# Patient Record
Sex: MALE | Race: WHITE | NOT HISPANIC OR LATINO | Employment: FULL TIME | URBAN - NONMETROPOLITAN AREA
[De-identification: names, ages, dates, MRNs, and addresses within clinical notes are randomized per-mention and may not be internally consistent; named-entity substitution may affect disease eponyms.]

---

## 2017-02-03 ENCOUNTER — COMMUNICATION - GICH (OUTPATIENT)
Dept: FAMILY MEDICINE | Facility: OTHER | Age: 47
End: 2017-02-03

## 2017-02-03 DIAGNOSIS — F33.1 MAJOR DEPRESSIVE DISORDER, RECURRENT, MODERATE (H): ICD-10-CM

## 2017-02-09 ENCOUNTER — COMMUNICATION - GICH (OUTPATIENT)
Dept: FAMILY MEDICINE | Facility: OTHER | Age: 47
End: 2017-02-09

## 2017-02-09 DIAGNOSIS — K43.6 OTHER AND UNSPECIFIED VENTRAL HERNIA WITH OBSTRUCTION, WITHOUT GANGRENE: ICD-10-CM

## 2017-02-13 ENCOUNTER — AMBULATORY - GICH (OUTPATIENT)
Dept: FAMILY MEDICINE | Facility: OTHER | Age: 47
End: 2017-02-13

## 2017-02-14 ENCOUNTER — OFFICE VISIT - GICH (OUTPATIENT)
Dept: FAMILY MEDICINE | Facility: OTHER | Age: 47
End: 2017-02-14

## 2017-02-14 ENCOUNTER — HISTORY (OUTPATIENT)
Dept: FAMILY MEDICINE | Facility: OTHER | Age: 47
End: 2017-02-14

## 2017-02-14 ENCOUNTER — COMMUNICATION - GICH (OUTPATIENT)
Dept: FAMILY MEDICINE | Facility: OTHER | Age: 47
End: 2017-02-14

## 2017-02-14 DIAGNOSIS — F33.1 MAJOR DEPRESSIVE DISORDER, RECURRENT, MODERATE (H): ICD-10-CM

## 2017-02-14 DIAGNOSIS — K43.6 OTHER AND UNSPECIFIED VENTRAL HERNIA WITH OBSTRUCTION, WITHOUT GANGRENE: ICD-10-CM

## 2017-02-14 DIAGNOSIS — I10 ESSENTIAL (PRIMARY) HYPERTENSION: ICD-10-CM

## 2017-02-14 DIAGNOSIS — E11.9 TYPE 2 DIABETES MELLITUS WITHOUT COMPLICATIONS (H): ICD-10-CM

## 2017-02-14 DIAGNOSIS — E11.65 TYPE 2 DIABETES MELLITUS WITH HYPERGLYCEMIA (H): ICD-10-CM

## 2017-02-14 DIAGNOSIS — Z71.89 OTHER SPECIFIED COUNSELING: Chronic | ICD-10-CM

## 2017-02-14 DIAGNOSIS — Z79.899 OTHER LONG TERM (CURRENT) DRUG THERAPY: ICD-10-CM

## 2017-05-01 ENCOUNTER — COMMUNICATION - GICH (OUTPATIENT)
Dept: FAMILY MEDICINE | Facility: OTHER | Age: 47
End: 2017-05-01

## 2017-05-01 DIAGNOSIS — K43.6 OTHER AND UNSPECIFIED VENTRAL HERNIA WITH OBSTRUCTION, WITHOUT GANGRENE: ICD-10-CM

## 2017-05-01 DIAGNOSIS — I10 ESSENTIAL (PRIMARY) HYPERTENSION: ICD-10-CM

## 2017-05-01 DIAGNOSIS — R80.9 PROTEINURIA: ICD-10-CM

## 2017-05-01 DIAGNOSIS — F33.1 MAJOR DEPRESSIVE DISORDER, RECURRENT, MODERATE (H): ICD-10-CM

## 2017-05-01 DIAGNOSIS — E11.65 TYPE 2 DIABETES MELLITUS WITH HYPERGLYCEMIA (H): ICD-10-CM

## 2017-05-01 DIAGNOSIS — E11.9 TYPE 2 DIABETES MELLITUS WITHOUT COMPLICATIONS (H): ICD-10-CM

## 2017-05-01 DIAGNOSIS — F41.1 GENERALIZED ANXIETY DISORDER: ICD-10-CM

## 2017-05-01 DIAGNOSIS — R07.1 CHEST PAIN ON BREATHING: ICD-10-CM

## 2017-05-03 ENCOUNTER — COMMUNICATION - GICH (OUTPATIENT)
Dept: FAMILY MEDICINE | Facility: OTHER | Age: 47
End: 2017-05-03

## 2017-05-03 DIAGNOSIS — Z79.4 LONG TERM CURRENT USE OF INSULIN (H): ICD-10-CM

## 2017-05-03 DIAGNOSIS — E11.65 TYPE 2 DIABETES MELLITUS WITH HYPERGLYCEMIA (H): ICD-10-CM

## 2017-05-04 ENCOUNTER — COMMUNICATION - GICH (OUTPATIENT)
Dept: FAMILY MEDICINE | Facility: OTHER | Age: 47
End: 2017-05-04
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
## 2017-05-05 ENCOUNTER — HISTORY (OUTPATIENT)
Dept: FAMILY MEDICINE | Facility: OTHER | Age: 47
End: 2017-05-05

## 2017-05-05 ENCOUNTER — OFFICE VISIT - GICH (OUTPATIENT)
Dept: FAMILY MEDICINE | Facility: OTHER | Age: 47
End: 2017-05-05

## 2017-05-05 ENCOUNTER — HOSPITAL ENCOUNTER (OUTPATIENT)
Dept: RADIOLOGY | Facility: OTHER | Age: 47
End: 2017-05-05
Attending: FAMILY MEDICINE

## 2017-05-05 DIAGNOSIS — Z79.4 LONG TERM CURRENT USE OF INSULIN (H): ICD-10-CM

## 2017-05-05 DIAGNOSIS — E11.65 TYPE 2 DIABETES MELLITUS WITH HYPERGLYCEMIA (H): ICD-10-CM

## 2017-05-05 DIAGNOSIS — K43.6 OTHER AND UNSPECIFIED VENTRAL HERNIA WITH OBSTRUCTION, WITHOUT GANGRENE: ICD-10-CM

## 2017-05-05 DIAGNOSIS — M17.31 POST-TRAUMATIC OSTEOARTHRITIS OF RIGHT KNEE: ICD-10-CM

## 2017-05-05 DIAGNOSIS — I10 ESSENTIAL (PRIMARY) HYPERTENSION: ICD-10-CM

## 2017-05-05 LAB
ALB RAND URINE - HISTORICAL: 490.1 MG/L
ANION GAP - HISTORICAL: 9 (ref 5–18)
BUN SERPL-MCNC: 19 MG/DL (ref 7–25)
BUN/CREAT RATIO - HISTORICAL: 21
CALCIUM SERPL-MCNC: 9.6 MG/DL (ref 8.6–10.3)
CHLORIDE SERPLBLD-SCNC: 99 MMOL/L (ref 98–107)
CO2 SERPL-SCNC: 26 MMOL/L (ref 21–31)
CREAT SERPL-MCNC: 0.9 MG/DL (ref 0.7–1.3)
CREATININE, URINE - HISTORICAL: 2.05 G/L
ESTIMATED AVERAGE GLUCOSE: 223 MG/DL
GFR IF NOT AFRICAN AMERICAN - HISTORICAL: >60 ML/MIN/1.73M2
GLUCOSE SERPL-MCNC: 186 MG/DL (ref 70–105)
HEMOGLOBIN A1C MONITORING (POCT) - HISTORICAL: 9.4 % (ref 4–6.2)
MICROALBUMIN, RAND UR - HISTORICAL: 239.1 MG/G CREAT
POTASSIUM SERPL-SCNC: 4.2 MMOL/L (ref 3.5–5.1)
SODIUM SERPL-SCNC: 134 MMOL/L (ref 133–143)

## 2017-06-09 ENCOUNTER — COMMUNICATION - GICH (OUTPATIENT)
Dept: FAMILY MEDICINE | Facility: OTHER | Age: 47
End: 2017-06-09

## 2017-06-09 DIAGNOSIS — E11.65 TYPE 2 DIABETES MELLITUS WITH HYPERGLYCEMIA (H): ICD-10-CM

## 2017-06-12 ENCOUNTER — AMBULATORY - GICH (OUTPATIENT)
Dept: FAMILY MEDICINE | Facility: OTHER | Age: 47
End: 2017-06-12

## 2017-09-11 ENCOUNTER — AMBULATORY - GICH (OUTPATIENT)
Dept: LAB | Facility: OTHER | Age: 47
End: 2017-09-11

## 2017-09-11 ENCOUNTER — HOSPITAL ENCOUNTER (OUTPATIENT)
Dept: RADIOLOGY | Facility: OTHER | Age: 47
End: 2017-09-11
Attending: FAMILY MEDICINE

## 2017-09-11 ENCOUNTER — OFFICE VISIT - GICH (OUTPATIENT)
Dept: FAMILY MEDICINE | Facility: OTHER | Age: 47
End: 2017-09-11

## 2017-09-11 ENCOUNTER — HISTORY (OUTPATIENT)
Dept: FAMILY MEDICINE | Facility: OTHER | Age: 47
End: 2017-09-11

## 2017-09-11 DIAGNOSIS — K43.6 OTHER AND UNSPECIFIED VENTRAL HERNIA WITH OBSTRUCTION, WITHOUT GANGRENE: ICD-10-CM

## 2017-09-11 DIAGNOSIS — F33.1 MAJOR DEPRESSIVE DISORDER, RECURRENT, MODERATE (H): ICD-10-CM

## 2017-09-11 DIAGNOSIS — R07.1 CHEST PAIN ON BREATHING: ICD-10-CM

## 2017-09-11 DIAGNOSIS — Z79.4 LONG TERM CURRENT USE OF INSULIN (H): ICD-10-CM

## 2017-09-11 DIAGNOSIS — M17.31 POST-TRAUMATIC OSTEOARTHRITIS OF RIGHT KNEE: ICD-10-CM

## 2017-09-11 DIAGNOSIS — C18.1 MALIGNANT NEOPLASM OF APPENDIX (H): ICD-10-CM

## 2017-09-11 DIAGNOSIS — E11.65 TYPE 2 DIABETES MELLITUS WITH HYPERGLYCEMIA (H): ICD-10-CM

## 2017-09-11 DIAGNOSIS — E11.9 TYPE 2 DIABETES MELLITUS WITHOUT COMPLICATIONS (H): ICD-10-CM

## 2017-09-11 DIAGNOSIS — M16.12 PRIMARY OSTEOARTHRITIS OF LEFT HIP: ICD-10-CM

## 2017-09-11 DIAGNOSIS — R80.9 PROTEINURIA: ICD-10-CM

## 2017-09-11 DIAGNOSIS — I10 ESSENTIAL (PRIMARY) HYPERTENSION: ICD-10-CM

## 2017-09-11 LAB
A/G RATIO - HISTORICAL: 1.2 (ref 1–2)
ABSOLUTE BASOPHILS - HISTORICAL: 0.1 THOU/CU MM
ABSOLUTE EOSINOPHILS - HISTORICAL: 0.1 THOU/CU MM
ABSOLUTE IMMATURE GRANULOCYTES(METAS,MYELOS,PROS) - HISTORICAL: 0 THOU/CU MM
ABSOLUTE LYMPHOCYTES - HISTORICAL: 3.3 THOU/CU MM (ref 0.9–2.9)
ABSOLUTE MONOCYTES - HISTORICAL: 0.9 THOU/CU MM
ABSOLUTE NEUTROPHILS - HISTORICAL: 5.4 THOU/CU MM (ref 1.7–7)
ALBUMIN SERPL-MCNC: 3.8 G/DL (ref 3.5–5.7)
ALP SERPL-CCNC: 71 IU/L (ref 34–104)
ALT (SGPT) - HISTORICAL: 60 IU/L (ref 7–52)
ANION GAP - HISTORICAL: 14 (ref 5–18)
AST SERPL-CCNC: 51 IU/L (ref 13–39)
BASOPHILS # BLD AUTO: 0.6 %
BILIRUB SERPL-MCNC: 0.6 MG/DL (ref 0.3–1)
BUN SERPL-MCNC: 11 MG/DL (ref 7–25)
BUN/CREAT RATIO - HISTORICAL: 14
CALCIUM SERPL-MCNC: 9.3 MG/DL (ref 8.6–10.3)
CHLORIDE SERPLBLD-SCNC: 100 MMOL/L (ref 98–107)
CO2 SERPL-SCNC: 20 MMOL/L (ref 21–31)
CREAT SERPL-MCNC: 0.77 MG/DL (ref 0.7–1.3)
EOSINOPHIL NFR BLD AUTO: 1 %
ERYTHROCYTE [DISTWIDTH] IN BLOOD BY AUTOMATED COUNT: 13.8 % (ref 11.5–15.5)
ESTIMATED AVERAGE GLUCOSE: 214 MG/DL
GFR IF NOT AFRICAN AMERICAN - HISTORICAL: >60 ML/MIN/1.73M2
GLOBULIN - HISTORICAL: 3.3 G/DL (ref 2–3.7)
GLUCOSE SERPL-MCNC: 142 MG/DL (ref 70–105)
HCT VFR BLD AUTO: 43 % (ref 37–53)
HEMOGLOBIN A1C MONITORING (POCT) - HISTORICAL: 9.1 % (ref 4–6.2)
HEMOGLOBIN: 14.7 G/DL (ref 13.5–17.5)
IMMATURE GRANULOCYTES(METAS,MYELOS,PROS) - HISTORICAL: 0.3 %
LYMPHOCYTES NFR BLD AUTO: 34.1 % (ref 20–44)
MCH RBC QN AUTO: 32.2 PG (ref 26–34)
MCHC RBC AUTO-ENTMCNC: 34.2 G/DL (ref 32–36)
MCV RBC AUTO: 94 FL (ref 80–100)
MONOCYTES NFR BLD AUTO: 8.9 %
NEUTROPHILS NFR BLD AUTO: 55.1 % (ref 42–72)
PLATELET # BLD AUTO: 331 THOU/CU MM (ref 140–440)
PMV BLD: 10.4 FL (ref 6.5–11)
POTASSIUM SERPL-SCNC: 4.3 MMOL/L (ref 3.5–5.1)
PROT SERPL-MCNC: 7.1 G/DL (ref 6.4–8.9)
RED BLOOD COUNT - HISTORICAL: 4.56 MIL/CU MM (ref 4.3–5.9)
SODIUM SERPL-SCNC: 134 MMOL/L (ref 133–143)
WHITE BLOOD COUNT - HISTORICAL: 9.8 THOU/CU MM (ref 4.5–11)

## 2017-09-12 LAB
ALB RAND URINE - HISTORICAL: 1191.5 MG/L
CREATININE, URINE - HISTORICAL: 3.25 G/L
MICROALBUMIN, RAND UR - HISTORICAL: 366.6 MG/G CREAT

## 2017-09-13 ENCOUNTER — AMBULATORY - GICH (OUTPATIENT)
Dept: FAMILY MEDICINE | Facility: OTHER | Age: 47
End: 2017-09-13

## 2017-09-13 DIAGNOSIS — F33.1 MAJOR DEPRESSIVE DISORDER, RECURRENT, MODERATE (H): ICD-10-CM

## 2017-09-15 ENCOUNTER — AMBULATORY - GICH (OUTPATIENT)
Dept: SCHEDULING | Facility: OTHER | Age: 47
End: 2017-09-15

## 2017-11-06 ENCOUNTER — HOSPITAL ENCOUNTER (OUTPATIENT)
Dept: RADIOLOGY | Facility: OTHER | Age: 47
End: 2017-11-06
Attending: FAMILY MEDICINE

## 2017-11-06 ENCOUNTER — HISTORY (OUTPATIENT)
Dept: FAMILY MEDICINE | Facility: OTHER | Age: 47
End: 2017-11-06

## 2017-11-06 ENCOUNTER — OFFICE VISIT - GICH (OUTPATIENT)
Dept: FAMILY MEDICINE | Facility: OTHER | Age: 47
End: 2017-11-06

## 2017-11-06 DIAGNOSIS — M54.10 RADICULOPATHY: ICD-10-CM

## 2017-11-06 DIAGNOSIS — M70.62 TROCHANTERIC BURSITIS OF LEFT HIP: ICD-10-CM

## 2017-11-06 DIAGNOSIS — F41.1 GENERALIZED ANXIETY DISORDER: ICD-10-CM

## 2017-11-11 ENCOUNTER — COMMUNICATION - GICH (OUTPATIENT)
Dept: FAMILY MEDICINE | Facility: OTHER | Age: 47
End: 2017-11-11

## 2017-11-11 DIAGNOSIS — K43.6 OTHER AND UNSPECIFIED VENTRAL HERNIA WITH OBSTRUCTION, WITHOUT GANGRENE: ICD-10-CM

## 2017-11-11 DIAGNOSIS — R07.1 CHEST PAIN ON BREATHING: ICD-10-CM

## 2017-11-16 ENCOUNTER — COMMUNICATION - GICH (OUTPATIENT)
Dept: FAMILY MEDICINE | Facility: OTHER | Age: 47
End: 2017-11-16

## 2017-11-16 ENCOUNTER — OFFICE VISIT - GICH (OUTPATIENT)
Dept: FAMILY MEDICINE | Facility: OTHER | Age: 47
End: 2017-11-16

## 2017-11-16 ENCOUNTER — HISTORY (OUTPATIENT)
Dept: FAMILY MEDICINE | Facility: OTHER | Age: 47
End: 2017-11-16

## 2017-11-16 DIAGNOSIS — M70.62 TROCHANTERIC BURSITIS OF LEFT HIP: ICD-10-CM

## 2017-11-16 DIAGNOSIS — M17.31 POST-TRAUMATIC OSTEOARTHRITIS OF RIGHT KNEE: ICD-10-CM

## 2017-11-16 DIAGNOSIS — K43.6 OTHER AND UNSPECIFIED VENTRAL HERNIA WITH OBSTRUCTION, WITHOUT GANGRENE: ICD-10-CM

## 2017-11-21 ENCOUNTER — AMBULATORY - GICH (OUTPATIENT)
Dept: FAMILY MEDICINE | Facility: OTHER | Age: 47
End: 2017-11-21

## 2017-11-21 ENCOUNTER — HOSPITAL ENCOUNTER (OUTPATIENT)
Dept: RADIOLOGY | Facility: OTHER | Age: 47
End: 2017-11-21
Attending: FAMILY MEDICINE

## 2017-11-21 DIAGNOSIS — M54.10 RADICULOPATHY: ICD-10-CM

## 2017-11-22 ENCOUNTER — AMBULATORY - GICH (OUTPATIENT)
Dept: FAMILY MEDICINE | Facility: OTHER | Age: 47
End: 2017-11-22

## 2017-11-22 DIAGNOSIS — M25.552 PAIN IN LEFT HIP: ICD-10-CM

## 2017-11-27 ENCOUNTER — HOSPITAL ENCOUNTER (OUTPATIENT)
Dept: RADIOLOGY | Facility: OTHER | Age: 47
End: 2017-11-27
Attending: FAMILY MEDICINE

## 2017-11-27 DIAGNOSIS — M25.552 PAIN IN LEFT HIP: ICD-10-CM

## 2017-11-28 ENCOUNTER — AMBULATORY - GICH (OUTPATIENT)
Dept: FAMILY MEDICINE | Facility: OTHER | Age: 47
End: 2017-11-28

## 2017-11-28 DIAGNOSIS — M16.12 PRIMARY OSTEOARTHRITIS OF LEFT HIP: ICD-10-CM

## 2017-12-04 ENCOUNTER — HOSPITAL ENCOUNTER (OUTPATIENT)
Dept: RADIOLOGY | Facility: OTHER | Age: 47
End: 2017-12-04
Attending: FAMILY MEDICINE

## 2017-12-04 DIAGNOSIS — M16.12 PRIMARY OSTEOARTHRITIS OF LEFT HIP: ICD-10-CM

## 2017-12-05 ENCOUNTER — AMBULATORY - GICH (OUTPATIENT)
Dept: FAMILY MEDICINE | Facility: OTHER | Age: 47
End: 2017-12-05

## 2017-12-27 NOTE — PROGRESS NOTES
Patient Information     Patient Name MRN Sex Jaswant Quevedo [id number] Male 1970      Progress Notes by Ashia Sam at 2017  2:11 PM     Author:  Ashia Sam Service:  (none) Author Type:  Other Clinical Staff     Filed:  2017  2:12 PM Date of Service:  2017  2:11 PM Status:  Signed     :  Ashia Sam (Other Clinical Staff)            1.  Has the patient had a previous reaction to IV contrast? No    2.  Does the patient have kidney disease? No    3.  Is the patient on dialysis? No    If YES to any of these questions, exam will be reviewed with a Radiologist before administering contrast.

## 2017-12-27 NOTE — PROGRESS NOTES
"Patient Information     Patient Name MRN Sex Jaswant Quevedo [id number] Male 1970      Progress Notes by Mik Kumari MD at 2017 11:45 AM     Author:  Mik Kumari MD  Service:  (none) Author Type:  Physician     Filed:  2017  7:39 AM  Encounter Date:  2017 Status:  Addendum     :  Mik Kumari MD (Physician)        Related Notes: Original Note by Mik Kumari MD (Physician) filed at 2017  6:52 AM            SUBJECTIVE:    Jaswant Chong is a 47 y.o. male who presents for left hip pains and foot weakness    HPI    sig pain for about 10 days in left hip area.  Feels like a pinched nerve.  Tried wearing his support hose, no help.  No known injuries.  Has some left leg weakness, and stumbled with walking.  No lumbar pain.    No Known Allergies,   Current Outpatient Prescriptions on File Prior to Visit       Medication  Sig Dispense Refill     amLODIPine (NORVASC) 10 mg tablet Take 1 tablet by mouth once daily. 90 tablet 3     blood sugar diagnostic (BLOOD GLUCOSE TEST) strip Dispense item covered by pt ins. E11.9 NIDDM type II - Test 3 times/day, Reason: Unstable diabetes 300 Each 4     blood-glucose meter Dispense meter covered by pt ins. E11.9 IDDM type II - Test 3 times/day. 1 Device 0     DME Blood pressure machine 1 Each 0     doxycycline (VIBRAMYCIN) 100 mg capsule 2 caps once 2 capsule 6     indomethacin (INDOCIN) 50 mg capsule Take 1 capsule by mouth 3 times daily with meals. 42 capsule 2     insulin aspart (NOVOLOG FLEXPEN) 100 unit/mL solution for injection Inject 5 Units subcutaneous 3 times daily before meals. 5 pen 3     insulin glargine (LANTUS SOLOSTAR PEN; BASAGLAR KWIKPEN) 100 unit/mL (3 mL) pen Inject 15 Units subcutaneous before bedtime. 1 box 12     Insulin Needles, Disposable, (VELIA PEN NEEDLE) 32 gauge x 32\" As directed. For administering insulin at home. 1 box 12     lancets Dispense item covered by pt ins. E11.9 NIDDM type II - Test 3 " times/day, Reason: Unstable diabetes 300 Each 4     liraglutide (VICTOZA) 0.6 mg/0.1 mL (18 mg/3 mL) injection Inject 0.6 mg subcutaneous once daily. 9 mL 3     lisinopril (PRINIVIL; ZESTRIL) 40 mg tablet Take 1 tablet by mouth once daily. 90 tablet 1     LORazepam (ATIVAN) 1 mg tablet Take 1 tablet by mouth every 6 hours if needed for Anxiety. 30 tablet 2     metFORMIN (GLUCOPHAGE) 1,000 mg tablet Take 1 tablet by mouth 2 times daily with meals. 180 tablet 3     metoprolol succinate SR (TOPROL XL) 200 mg Sustained-Release tablet Take 1 tablet by mouth once daily. 90 tablet 3     ondansetron (ZOFRAN ODT) 4 mg disintegrating tablet Place 1 tablet on the tongue every 8 hours if needed for Nausea/Vomiting. 30 tablet 3     oxyCODONE-acetaminophen, 5-325 mg, (PERCOCET) 5-325 mg per tablet Take 1-2 tablets by mouth every 6 hours if needed  Max acetaminophen dose: 4000mg in 24 hrs. Fill on/ after 11/10/17 120 tablet 0     pravastatin (PRAVACHOL) 20 mg tablet Take 1 tablet by mouth at bedtime. 90 tablet 3     sertraline (ZOLOFT) 100 mg tablet Take 1 tablet by mouth once daily. 90 tablet 3     traMADol (ULTRAM) 50 mg tablet Take 1 tablet by mouth 4 times daily. 120 tablet 5     typhoid vaccin,live,attenuated (VIVOTIF) 2 billion unit capsule 1 capsule ORALLY x 4 doses, on days 1, 3, 5, and 7, to be completed at least one week prior to potential exposure 4 capsule 0     No current facility-administered medications on file prior to visit.    ,   Past Medical History:     Diagnosis  Date     Cancer of appendix (HC)     H/O      Colovesical fistula     history of, secondary to diverticulitis.      Diabetes mellitus (HC)     Type 2      Ventral hernia     and   Past Surgical History:      Procedure  Laterality Date     COLECTOMY  2006     Hemicolectomy with appendiceal cancer noted, mucinous type.       CYST REMOVAL      sebaceous, scalp       excision of finger mass  4/25/13    left thumb, Dr. Donald       HERNIA REPAIR  03/08     Repair of surgical wound hernia, metastatic cancer incidentally noted.       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 Marks       HERNIA REPAIR  11/2001    Reduction of ventral hernia and mesh repair of hernia       HERNIA REPAIR  12/2012    Removal mesh with infection 12/12       HX ABDOMEN PERITONEUM OMENTUM SURGERY  04/08    removal of left hemidiaphragm and omentum [Other] as well as intraperitoneal chemotherapy, 5 FU and Oxaliplatin for metastatic mucinous adenocarcinoma of the appendix.[[[       LYMPH NODE DISSECTION  2007       REVIEW OF SYSTEMS:  Review of Systems   Constitutional: Negative for chills and fever.   Musculoskeletal: Positive for joint pain.   Neurological: Positive for tingling.       OBJECTIVE:  /66  Pulse 66  Resp 16    EXAM:   Physical Exam   Constitutional: He is oriented to person, place, and time and well-developed, well-nourished, and in no distress. No distress.   Musculoskeletal:   No lumbar or sciatic notch pain on palpitation.  Neg straight leg raise.  2/5 weakness with left toe dorsiflexion.  Moderate pain on palpitation over the left greater trochanter.   Neurological: He is alert and oriented to person, place, and time.   Skin: He is not diaphoretic.   Psychiatric: Memory, affect and judgment normal.     X-ray shows moderate ddd, with narrowing of the L5/S1 foramen     ASSESSMENT/PLAN:    ICD-10-CM    1. Radicular syndrome of left leg M54.10 XR SPINE LUMBAR 3 VIEWS      MR SPINE LUMBAR WO   2. Trochanteric bursitis of left hip M70.62 OH DRAIN/INJECT LARGE JOINT/BURSA (hip, knee,shoulder) - single joint  [03259.0]      triamcinolone acetonide 80 mg injection (KENALOG)        Plan:  It seems there are 2 different but likely related issues.  For symptoms relief, injection of the bursa today.  Discussed with him risks and he consented.  Infiltrated  with 3 mL 1 % lidocaine anfd80 mg depotmedrol.    I am a bit worried about a L5 impingement.  Discussed with him an EMG or MRI.  Will next get the MRI.    Mik Kumari MD ....................  11/6/2017   12:18 PM    Patient did indeed receive depotmedrol, not kenalog as stated above.  Mik Kumari MD ....................  11/21/2017   6:52 AM    Upon further discussion with nursing staff, kenalog was actually given.  Not depotmedrol.  Mik Kumari MD ....................  11/28/2017   7:39 AM

## 2017-12-27 NOTE — PROGRESS NOTES
Patient Information     Patient Name MRN Sex Jaswant Quevedo [id number] Male 1970      Progress Notes by Dagmar Wilkinson at 2017  9:15 AM     Author:  Dagmar Wilkinson Service:  (none) Author Type:  Other Clinical Staff     Filed:  2017  9:16 AM Date of Service:  2017  9:15 AM Status:  Signed     :  Dagmar Wilkinson (Other Clinical Staff)            Falls Risk Criteria:    Age 65 and older or under age 4        Sensory deficits    Poor vision    Use of ambulatory aides    Impaired judgment    Unable to walk independently    Meets High Risk criteria for falls:  no

## 2017-12-27 NOTE — PROGRESS NOTES
"Patient Information     Patient Name MRN Sex Jaswant Quevedo [id number] Male 1970      Progress Notes by Mik Kumari MD at 2017 11:15 AM     Author:  Mik Kumari MD Service:  (none) Author Type:  Physician     Filed:  2017  7:33 AM Encounter Date:  2017 Status:  Signed     :  Mik Kumari MD (Physician)            SUBJECTIVE:    Jaswant Chong is a 47 y.o. male who presents for pain med refill    HPI    Having lots of pain in the hip.  Had a steroid injection 2 weeks ago, helped for just a few days.  MRI is next week, canceled it until his wife leaves for her [place] vacation.  Leg still feels weak.  Has a hard time getting out of a car.    Also due for percocet refills.  Uses 3-4 ultram tabs daily and percocet is 0-2 daily.  Tylenol, alleve and advil not helpful at all.    No Known Allergies,   Current Outpatient Prescriptions on File Prior to Visit       Medication  Sig Dispense Refill     amLODIPine (NORVASC) 10 mg tablet Take 1 tablet by mouth once daily. 90 tablet 3     blood sugar diagnostic (BLOOD GLUCOSE TEST) strip Dispense item covered by pt ins. E11.9 NIDDM type II - Test 3 times/day, Reason: Unstable diabetes 300 Each 4     blood-glucose meter Dispense meter covered by pt ins. E11.9 IDDM type II - Test 3 times/day. 1 Device 0     DME Blood pressure machine 1 Each 0     doxycycline (VIBRAMYCIN) 100 mg capsule 2 caps once 2 capsule 6     indomethacin (INDOCIN) 50 mg capsule TAKE 1 CAPSULE BY MOUTH THREE TIMES DAILY WITH MEALS 42 capsule 0     insulin aspart (NOVOLOG FLEXPEN) 100 unit/mL solution for injection Inject 5 Units subcutaneous 3 times daily before meals. 5 pen 3     insulin glargine (LANTUS SOLOSTAR PEN; BASAGLAR KWIKPEN) 100 unit/mL (3 mL) pen Inject 15 Units subcutaneous before bedtime. 1 box 12     Insulin Needles, Disposable, (VELIA PEN NEEDLE) 32 gauge x 32\" As directed. For administering insulin at home. 1 box 12     lancets Dispense item " covered by pt ins. E11.9 NIDDM type II - Test 3 times/day, Reason: Unstable diabetes 300 Each 4     liraglutide (VICTOZA) 0.6 mg/0.1 mL (18 mg/3 mL) injection Inject 0.6 mg subcutaneous once daily. 9 mL 3     lisinopril (PRINIVIL; ZESTRIL) 40 mg tablet Take 1 tablet by mouth once daily. 90 tablet 1     LORazepam (ATIVAN) 1 mg tablet Take 1 tablet by mouth every 6 hours if needed for Anxiety. 1 tablet 0     metFORMIN (GLUCOPHAGE) 1,000 mg tablet Take 1 tablet by mouth 2 times daily with meals. 180 tablet 3     metoprolol succinate SR (TOPROL XL) 200 mg Sustained-Release tablet Take 1 tablet by mouth once daily. 90 tablet 3     ondansetron (ZOFRAN ODT) 4 mg disintegrating tablet Place 1 tablet on the tongue every 8 hours if needed for Nausea/Vomiting. 30 tablet 3     pravastatin (PRAVACHOL) 20 mg tablet Take 1 tablet by mouth at bedtime. 90 tablet 3     sertraline (ZOLOFT) 100 mg tablet Take 1 tablet by mouth once daily. 90 tablet 3     traMADol (ULTRAM) 50 mg tablet Take 1 tablet by mouth 4 times daily. 120 tablet 5     typhoid vaccin,live,attenuated (VIVOTIF) 2 billion unit capsule 1 capsule ORALLY x 4 doses, on days 1, 3, 5, and 7, to be completed at least one week prior to potential exposure 4 capsule 0     No current facility-administered medications on file prior to visit.    ,   Past Medical History:     Diagnosis  Date     Cancer of appendix (HC)     H/O      Colovesical fistula     history of, secondary to diverticulitis.      Diabetes mellitus (HC)     Type 2      Ventral hernia     and   Past Surgical History:      Procedure  Laterality Date     COLECTOMY  2006     Hemicolectomy with appendiceal cancer noted, mucinous type.       CYST REMOVAL      sebaceous, scalp       excision of finger mass  4/25/13    left thumb, Dr. Donald       HERNIA REPAIR  03/08    Repair of surgical wound hernia, metastatic cancer incidentally noted.       HERNIA REPAIR  04/09     Ventral hernia repair, recurrent appendiceal  "carcinoma, resection of 7 centimeter left upper quadrant tumor with splenectomy, Surgeon, Dr. Marks       HERNIA REPAIR  11/2001    Reduction of ventral hernia and mesh repair of hernia       HERNIA REPAIR  12/2012    Removal mesh with infection 12/12       HX ABDOMEN PERITONEUM OMENTUM SURGERY  04/08    removal of left hemidiaphragm and omentum [Other] as well as intraperitoneal chemotherapy, 5 FU and Oxaliplatin for metastatic mucinous adenocarcinoma of the appendix.[[[       LYMPH NODE DISSECTION  2007       REVIEW OF SYSTEMS:  Review of Systems   Constitutional: Negative for chills and fever.   Gastrointestinal: Positive for abdominal pain.   Musculoskeletal: Positive for joint pain.   Neurological: Positive for tingling and focal weakness.   Psychiatric/Behavioral: Negative for substance abuse.       OBJECTIVE:  /82  Pulse 62  Ht 1.91 m (6' 3.2\")  Wt (!) 145.3 kg (320 lb 4 oz)  BMI 39.82 kg/m2    EXAM:   Physical Exam   Constitutional: He is oriented to person, place, and time and well-developed, well-nourished, and in no distress. No distress.   HENT:   Head: Normocephalic and atraumatic.   Musculoskeletal:   No lumbar pain on palpation.  Slow to stand.  Mild pain over left greater trochanter.  Left foot dorsiflexion is 3-4/5 right is 5/5.   Neurological: He is alert and oriented to person, place, and time.   Skin: He is not diaphoretic.   Psychiatric: Memory, affect and judgment normal.       ASSESSMENT/PLAN:    ICD-10-CM    1. Ventral hernia with obstruction K43.6 oxyCODONE-acetaminophen, 5-325 mg, (PERCOCET) 5-325 mg per tablet      DISCONTINUED: oxyCODONE-acetaminophen, 5-325 mg, (PERCOCET) 5-325 mg per tablet      DISCONTINUED: oxyCODONE-acetaminophen, 5-325 mg, (PERCOCET) 5-325 mg per tablet   2. Post-traumatic osteoarthritis of right knee M17.31 oxyCODONE-acetaminophen, 5-325 mg, (PERCOCET) 5-325 mg per tablet      DISCONTINUED: oxyCODONE-acetaminophen, 5-325 mg, (PERCOCET) 5-325 mg per " tablet      DISCONTINUED: oxyCODONE-acetaminophen, 5-325 mg, (PERCOCET) 5-325 mg per tablet   3. Trochanteric bursitis, left hip M70.62         Plan:  I refilled the percocet, for 3 months total.  I suspect he has a left L5 radiculopathy.  I advise therapy, but he wants to wait for the MRI.  It is too early for a repeat trochanter injection.    Mik Kumari MD ....................  11/16/2017   2:55 PM

## 2017-12-27 NOTE — PROGRESS NOTES
Patient Information     Patient Name MRN Sex Jaswant Quevedo [id number] Male 1970      Progress Notes by Ashia Sam at 2017  2:12 PM     Author:  Ashia Sam Service:  (none) Author Type:  Other Clinical Staff     Filed:  2017  2:12 PM Date of Service:  2017  2:12 PM Status:  Signed     :  Ashia Sam (Other Clinical Staff)            Falls Risk Criteria:    Age 65 and older or under age 4        Sensory deficits    Poor vision    Use of ambulatory aides    Impaired judgment    Unable to walk independently    Meets High Risk criteria for falls:  no

## 2017-12-28 NOTE — TELEPHONE ENCOUNTER
Patient Information     Patient Name MRN Jaswant Dunham [id number] Male 1970      Telephone Encounter by Amy Xiong RN at 2017  9:42 AM     Author:  Amy Xiong RN Service:  (none) Author Type:  NURS- Registered Nurse     Filed:  2017  9:54 AM Encounter Date:  2017 Status:  Signed     :  Amy Xiong RN (NURS- Registered Nurse)            Tramadol filled 2017 #[id number]  Unable to complete prescription refill per RN Medication Refill Policy.................... Amy Xiong RN ....................  2017   9:45 AM      GOUT    Office visit in the past 12 months or per provider note.    Last visit with HAYDE BRYANT was on: 2017 in [facility]  Next visit with HAYDE BRYANT is on: No future appointment listed with this provider  Next visit with Family Practice is on: No future appointment listed in this department    Max refill for 12 months from last office visit or per provider note.    Prescription refilled per RN Medication Refill Policy.................... Amy Xiong RN ....................  2017   9:54 AM

## 2017-12-28 NOTE — TELEPHONE ENCOUNTER
Patient Information     Patient Name MRN Jaswant Dunham [id number] Male 1970      Telephone Encounter by Jojo Jorge RN at 2017  4:07 PM     Author:  Jojo Jorge RN Service:  (none) Author Type:  NURS- Registered Nurse     Filed:  2017  4:15 PM Encounter Date:  2017 Status:  Signed     :  Jojo Jorge RN (NURS- Registered Nurse)            Diabetic Supplies    Office visit in the past 12 months.    Last visit with HAYDE BRYANT was on: 2017 in [facility]  Next visit with HAYDE BRYANT is on: No future appointment listed with this provider  Next visit with Family Practice is on: No future appointment listed in this department    Max refill for 12 months from last office visit.  Always add ICD-9 code.    Needs not be listed on Med List to fill.  Need new RX every 12 months or if exceeds the limitations set by Medicare, then every 6 months.  Also when testing frequency is changed is there a need to obtain a new order.  A refill request does not need to be approved by the ordering physician-a beneficiary or their caregiver may request refills.  Physicians are not required to fill out additional forms such as home testing results for suppliers or provide additional documentation unless the supplier is audited and the  is requesting such documentation.      Refill request completed for patient's strips and lancets. Per the RN refill policy, unable to fill request for meter. To PCP for consideration.    Jojo Jorge RN............. 2017 4:08 PM

## 2017-12-28 NOTE — PROGRESS NOTES
Patient Information     Patient Name MRN Sex Jaswant Quevedo [id number] Male 1970      Progress Notes by Mik Kumari MD at 2017 10:30 AM     Author:  Mik Kumari MD Service:  (none) Author Type:  Physician     Filed:  2017 11:54 AM Encounter Date:  2017 Status:  Signed     :  Mik Kumari MD (Physician)            SUBJECTIVE:    Jaswant Chong is a 47 y.o. male who presents for follow up pain and diabetes mellitus    HPI    Is fasting currently.  Does not check his sugars currently.  Stropped a few weeks ago, but when he was checking it was higher in the AM, then lower through the day.  No lows.    Also, has noted weakness in the left leg for the last 6 weeks or so. Now is having a hard time walking up steps with this, has to do one at a time.  Cannot use the left leg to get up them.  No pain in his back.  He feels this weakness is from a varicose vein.   Uses compression stockings nearly daily.  Has knee pain, degenerative joint disease on last x-ray.    Hernia pain continues.  6/10.  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  Stools remain normal.      No Known Allergies,   Current Outpatient Prescriptions on File Prior to Visit       Medication  Sig Dispense Refill     amLODIPine (NORVASC) 10 mg tablet Take 1 tablet by mouth once daily. 90 tablet 3     blood sugar diagnostic (BLOOD GLUCOSE TEST) strip Dispense item covered by pt ins. E11.9 NIDDM type II - Test 3 times/day, Reason: Unstable diabetes 300 Each 4     blood-glucose meter Dispense meter covered by pt ins. E11.9 IDDM type II - Test 3 times/day. 1 Device 0     DME Blood pressure machine 1 Each 0     doxycycline (VIBRAMYCIN) 100 mg capsule 2 caps once 2 capsule 6     indomethacin (INDOCIN) 50 mg capsule Take 1 capsule by mouth 3 times daily with meals. 42 capsule 2     insulin aspart (NOVOLOG FLEXPEN) 100 unit/mL solution for injection Inject 5 Units subcutaneous 3 times daily  "before meals. 5 pen 3     insulin glargine (LANTUS SOLOSTAR PEN; BASAGLAR KWIKPEN) 100 unit/mL (3 mL) pen Inject 15 Units subcutaneous before bedtime. 1 box 12     Insulin Needles, Disposable, (VELIA PEN NEEDLE) 32 gauge x 5/32\" As directed. For administering insulin at home. 1 box 12     lancets Dispense item covered by pt ins. E11.9 NIDDM type II - Test 3 times/day, Reason: Unstable diabetes 300 Each 4     lisinopril (PRINIVIL; ZESTRIL) 40 mg tablet Take 1 tablet by mouth once daily. 90 tablet 1     LORazepam (ATIVAN) 1 mg tablet Take 1 tablet by mouth every 6 hours if needed for Anxiety. 30 tablet 2     metFORMIN (GLUCOPHAGE) 1,000 mg tablet Take 1 tablet by mouth 2 times daily with meals. 180 tablet 3     metoprolol succinate SR (TOPROL XL) 200 mg Sustained-Release tablet Take 1 tablet by mouth once daily. 90 tablet 3     ondansetron (ZOFRAN ODT) 4 mg disintegrating tablet Place 1 tablet on the tongue every 8 hours if needed for Nausea/Vomiting. 30 tablet 3     pravastatin (PRAVACHOL) 20 mg tablet Take 1 tablet by mouth at bedtime. 90 tablet 3     sertraline (ZOLOFT) 100 mg tablet Take 1 tablet by mouth once daily. 90 tablet 3     sitaGLIPtin (JANUVIA) 100 mg tablet Take 1 tablet by mouth once daily. 90 tablet 3     traMADol (ULTRAM) 50 mg tablet Take 1 tablet by mouth 4 times daily. 120 tablet 5     typhoid vaccin,live,attenuated (VIVOTIF) 2 billion unit capsule 1 capsule ORALLY x 4 doses, on days 1, 3, 5, and 7, to be completed at least one week prior to potential exposure 4 capsule 0     No current facility-administered medications on file prior to visit.    ,   Past Medical History:     Diagnosis  Date     Cancer of appendix (HC)     H/O      Colovesical fistula     history of, secondary to diverticulitis.      Diabetes mellitus (HC)     Type 2      Ventral hernia     and   Past Surgical History:      Procedure  Laterality Date     COLECTOMY  2006     Hemicolectomy with appendiceal cancer noted, mucinous " type.       CYST REMOVAL      sebaceous, scalp       excision of finger mass  4/25/13    left thumb, Dr. Donald       HERNIA REPAIR  03/08    Repair of surgical wound hernia, metastatic cancer incidentally noted.       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 Marks       HERNIA REPAIR  11/2001    Reduction of ventral hernia and mesh repair of hernia       HERNIA REPAIR  12/2012    Removal mesh with infection 12/12       HX ABDOMEN PERITONEUM OMENTUM SURGERY  04/08    removal of left hemidiaphragm and omentum [Other] as well as intraperitoneal chemotherapy, 5 FU and Oxaliplatin for metastatic mucinous adenocarcinoma of the appendix.[[[       LYMPH NODE DISSECTION  2007       REVIEW OF SYSTEMS:  Review of Systems   Constitutional: Negative for chills and fever.   Gastrointestinal: Positive for abdominal pain. Negative for constipation and diarrhea.   Musculoskeletal: Positive for joint pain. Negative for back pain.   Neurological: Negative for headaches.   Psychiatric/Behavioral: Negative for substance abuse.       OBJECTIVE:  /70  Resp 16    EXAM:   Physical Exam   Constitutional: He is oriented to person, place, and time and well-developed, well-nourished, and in no distress. No distress.   HENT:   Head: Normocephalic and atraumatic.   Neck: Neck supple. No thyromegaly present.   Cardiovascular: Normal rate, regular rhythm, normal heart sounds and intact distal pulses.  Exam reveals no gallop and no friction rub.    No murmur heard.  Abdominal: Soft. He exhibits distension. There is no tenderness. There is no rebound.   Very large left anterior ambulate wall hernia, reducible.   Musculoskeletal:   Is unable to stand on the exam table with left leg.  No pain in lumbar spine. Neg straight leg raise bilaterally.  Left knee with full range of motion, no effusion.  Stable on varus and valgus  stressing.   Neurological: He is alert and oriented to person, place, and time.   Skin: He is not diaphoretic.   Psychiatric: Memory, affect and judgment normal.       ASSESSMENT/PLAN:    ICD-10-CM    1. VENTRAL HERNIA WITH INCARCERATION K43.6    2. Primary osteoarthritis of left hip M16.12    3. Uncontrolled type 2 diabetes mellitus without complication, with long-term current use of insulin (HC) E11.65 MICROALBUMIN RANDOM URINE     Z79.4 Hgb A1c      liraglutide (VICTOZA) 0.6 mg/0.1 mL (18 mg/3 mL) injection   4. Ventral hernia with obstruction K43.6 oxyCODONE-acetaminophen, 5-325 mg, (PERCOCET) 5-325 mg per tablet      DISCONTINUED: oxyCODONE-acetaminophen, 5-325 mg, (PERCOCET) 5-325 mg per tablet      DISCONTINUED: oxyCODONE-acetaminophen, 5-325 mg, (PERCOCET) 5-325 mg per tablet   5. Post-traumatic osteoarthritis of right knee M17.31 oxyCODONE-acetaminophen, 5-325 mg, (PERCOCET) 5-325 mg per tablet      DISCONTINUED: oxyCODONE-acetaminophen, 5-325 mg, (PERCOCET) 5-325 mg per tablet      DISCONTINUED: oxyCODONE-acetaminophen, 5-325 mg, (PERCOCET) 5-325 mg per tablet   6. ADENOCARCINOMA, APPENDIX C18.1 CT ABDOMEN PELVIS W      CBC WITH DIFFERENTIAL      COMPLETE METABOLIC PANEL        Plan:   reviewed and stable.  Refilled the oxycodone for 3 more months.  Diabetes mellitus remains uncontrolled, so will add on victoza.  Follow up in 3 months.  Again discussed with discussed with him weight loss.  He feels he has lost some, but does not weigh himself.  Regarding the left leg weakness, I suspect it is hip related, as he has no back symptoms at all.  Of course a nerve impingement or even a met from his cancer is possible.  Last CT of abdomen was done at [facility] 1 year ago.  Was told to have yearly CT.    Mik Kumari MD ....................  9/11/2017   10:56 AM

## 2017-12-28 NOTE — PROGRESS NOTES
Patient Information     Patient Name MRN Sex Jaswant Quevedo [id number] Male 1970      Progress Notes by Ashia Sam at 2017  2:11 PM     Author:  Ashia Sam Service:  (none) Author Type:  Other Clinical Staff     Filed:  2017  2:11 PM Date of Service:  2017  2:11 PM Status:  Signed     :  Ashia Sam (Other Clinical Staff)            IV Contrast- Discharge Instructions After Your CT Scan      The IV contrast you received today will be filtered from your bloodstream by your kidneys during the next 24 hours and pass from the body in urine.  You will not be aware of this process and your urine will not change in color.  To help this process you should drink at least 4 additional glasses of water or juice today.  This reduces stress on your kidneys.    Most contrast reactions are immediate.  Should you develop symptoms of concern after discharge, contact the department at the number below.  After hours you should contact your personal physician.  If you develop breathing distress or wheezing, call 911.

## 2017-12-28 NOTE — TELEPHONE ENCOUNTER
Patient Information     Patient Name MRN Jaswant Dunham [id number] Male 1970      Telephone Encounter by Jailyn Baker at 2017  9:19 AM     Author:  Jailyn Baker Service:  (none) Author Type:  (none)     Filed:  2017  9:25 AM Encounter Date:  2017 Status:  Signed     :  Jailyn Baker            Pt left me  Message that he would like a refill of his percocet, stated that his hip really hurts bad  Jailyn Baker ....................  2017   9:20 AM

## 2017-12-28 NOTE — TELEPHONE ENCOUNTER
Patient Information     Patient Name MRN Sex Jaswant Quevedo [id number] Male 1970      Telephone Encounter by Jailyn Baker at 2017  9:24 AM     Author:  Jailyn Baker Service:  (none) Author Type:  (none)     Filed:  2017  9:25 AM Encounter Date:  2017 Status:  Signed     :  Jailyn Baker, stated needs to be seen, left a message for Pt  Jailyn Baker ....................  2017   9:24 AM

## 2017-12-30 NOTE — NURSING NOTE
Patient Information     Patient Name MRN Sex Jaswant Quevedo [id number] Male 1970      Nursing Note by Jailyn Baker at 2017 11:45 AM     Author:  Jailyn Baker Service:  (none) Author Type:  (none)     Filed:  2017 12:00 PM Encounter Date:  2017 Status:  Signed     :  Jailyn Baker            Time out done with name,date of birth,what is being done and where. Lidocaine 1% NDC 7698-4035-47, Lot number [id number]  Jailyn Baker ....................  2017   11:59 AM

## 2017-12-30 NOTE — NURSING NOTE
Patient Information     Patient Name MRN Jaswant Dunham [id number] Male 1970      Nursing Note by Jailyn Baker at 2017 10:30 AM     Author:  Jailyn Baker Service:  (none) Author Type:  (none)     Filed:  2017 10:35 AM Encounter Date:  2017 Status:  Signed     :  Jailyn Baker            Coming in for a medication check  Jailyn Baker ....................  2017   10:27 AM

## 2017-12-30 NOTE — NURSING NOTE
Patient Information     Patient Name MRN Sex Jaswant Quevedo [id number] Male 1970      Nursing Note by Geno Dunn at 2017 11:15 AM     Author:  Geno Dunn Service:  (none) Author Type:  (none)     Filed:  2017  2:43 PM Encounter Date:  2017 Status:  Signed     :  Geno Dunn            Patient presents to clinic today for medication management and he states his left hip is very painful.    Geno Dunn LPN...................2017  2:39 PM

## 2017-12-30 NOTE — NURSING NOTE
Patient Information     Patient Name MRN Sex Jaswant Quevedo [id number] Male 1970      Nursing Note by Jailyn Baker at 2017 11:45 AM     Author:  Jailyn Baker Service:  (none) Author Type:  (none)     Filed:  2017 11:21 AM Encounter Date:  2017 Status:  Signed     :  Jailyn Baker            coming in with Lt hip times 10 days  Jailyn Baker ....................  2017   11:11 AM

## 2018-01-03 NOTE — TELEPHONE ENCOUNTER
Patient Information     Patient Name MRN Jaswant Dunham [id number] Male 1970      Telephone Encounter by Shanika Eng at 2017  9:13 AM     Author:  Shanika Eng Service:  (none) Author Type:  NURS- Registered Nurse     Filed:  2017  9:16 AM Encounter Date:  2017 Status:  Signed     :  Shanika Eng (NURS- Registered Nurse)            Biguanides    Office visit in the past 12 months or per provider note.    Last visit with SANDRA HAN was on: No past appointments listed with this provider  Next visit with SANDRA HAN is on: No future appointment listed with this provider  Next visit with Family Practice is on: 2017 in [facility]    Lab test requirements:  HgbA1c annually or per provider note.  HEMOGLOBIN A1C MONITORING (POCT)    Date Value   2016 9.2 % (H)   03/15/2013 6.0 % NGSP     HEMOGLOBIN A1C GI (%)    Date Value   2011 5.9       Max refill for 12 months from last office visit or per provider note.    If taking for polycystic ovary disease, may refill for 12 months.      Prescription refilled per RN Medication Refill Policy.................... Shanika Eng RN ....................  2017   9:15 AM

## 2018-01-03 NOTE — TELEPHONE ENCOUNTER
Patient Information     Patient Name MRN Jaswant Dunham [id number] Male 1970      Telephone Encounter by Jojo Jorge RN at 2/3/2017  8:52 AM     Author:  Jojo Jorge RN Service:  (none) Author Type:  (none)     Filed:  2/3/2017  8:54 AM Encounter Date:  2/3/2017 Status:  Signed     :  Jojo Jorge RN (NURS- Registered Nurse)            Depression-in adults 18 and over  SSRI    Office visit in the past 12 months or as indicated in chart.  Should have clinic visit 1-2 months after initial prescription.    Last visit with HAYDE BRYANT was on: 2016 in [facility]  Next visit with HAYDE BRYANT is on: 2017 in [facility]  Next visit with Family Practice is on: 2017 in [facility]    Max refills 12 months from last office visit or per providers notes.    PHQ Depression Screening 2016   Date of PHQ exam (doc flow) (No Data) 2016   1. Lack of interest/pleasure - -   2. Feeling down/depressed - -   PHQ-2 TOTAL SCORE - -   3. Trouble sleeping - -   4. Decreased energy - -   5. Appetite change - -   6. Feelings of failure - -   7. Trouble concentrating - -   8. Activity level - -   9. Hurting yourself - -   PHQ-9 TOTAL SCORE - -   PHQ-9 Severity Level - -   Functional Impairment - -       Prescription refilled per RN Medication Refill Policy.................... Jojo Jorge ....................  2/3/2017   8:53 AM

## 2018-01-03 NOTE — PROGRESS NOTES
Patient Information     Patient Name MRN Sex Jaswant Quevedo [id number] Male 1970      Progress Notes by Mik Kumari MD at 2017  1:00 PM     Author:  Mik Kumari MD Service:  (none) Author Type:  Physician     Filed:  2017  8:00 AM Encounter Date:  2017 Status:  Signed     :  Mik Kumari MD (Physician)            SUBJECTIVE:    Jaswant Chong is a 46 y.o. male who presents for follow up htn and abdomen pain    HPI    He is leaving for [place] in 3 weeks or so.  Wonders about vaccines. With this he needs a 2 month supply.  Using the percocet about 2 daily or so now.  Pain is worse with activity, working with his dogs in the filed.  Has sent his CT scans to the [facility] by [facility] to see about getting his hernia repaired.    Weight is down a little, has been following a diabetes mellitus diet a bit better lately.  Does check his sugars. has not started the insulin.  Feels the Januvia is working well.    Results for orders placed or performed in visit on 16      HEMOGLOBIN A1C MONITORING (POCT)      Result  Value Ref Range    HEMOGLOBIN A1C MONITORING (POCT) 9.2 (H) 4.0 - 6.2 %    ESTIMATED AVERAGE GLUCOSE  217 mg/dL   LIPID PANEL      Result  Value Ref Range    CHOLESTEROL,TOTAL 206 (H) <200 mg/dL    TRIGLYCERIDES 237 (H) <150 mg/dL    HDL CHOLESTEROL 37 23 - 92 mg/dL    NON-HDL CHOLESTEROL 169 (H) <145 mg/dl    CHOL/HDL RATIO            5.57 (H) <4.50                    LDL CHOLESTEROL 122 (H) <100 mg/dL    PATIENT STATUS            NOT GIVEN                   MICROALBUMIN RANDOM URINE      Result  Value Ref Range    ALB RAND URINE            1630.0 mg/L    CREATININE,URINE          3.36 g/L    MICROALBUMIN,RAND UR      485.1 (H) <30.0 mg/g creat       No Known Allergies,   Current Outpatient Prescriptions on File Prior to Visit       Medication  Sig Dispense Refill     amLODIPine (NORVASC) 10 mg tablet Take 1 tablet by mouth once daily. 90 tablet 3      blood-glucose meter Dispense meter, test strips, lancets covered by pt ins. E11.9 IDDM type II - Test 3 times/day. 1 Device 12     DME Blood pressure machine 1 Each 0     indomethacin (INDOCIN) 50 mg capsule TAKE ONE CAPSULE BY MOUTH THREE TIMES DAILY with meals 42 capsule 2     insulin glargine (LANTUS SOLOSTAR PEN) 100 unit/mL (3 mL) solution for injection Inject 15 Units subcutaneous before bedtime. 1 box 12     insulin lispro (HUMALOG KWIKPEN) 100 unit/mL inpn pen Inject 5 Units subcutaneous 3 times daily before meals. 3 mL 0     lisinopril (PRINIVIL; ZESTRIL) 40 mg tablet TAKE ONE TABLET BY MOUTH ONE TIME DAILY 90 tablet 1     LORazepam (ATIVAN) 1 mg tablet Take 1 tablet by mouth every 6 hours if needed for Anxiety. 30 tablet 2     metFORMIN (GLUCOPHAGE) 1,000 mg tablet TAKE ONE TABLET BY MOUTH TWICE DAILY WITH MEALS 180 tablet 3     metoprolol succinate SR (TOPROL XL) 200 mg Sustained-Release tablet Take 1 tablet by mouth once daily. 90 tablet 3     ondansetron (ZOFRAN ODT) 4 mg disintegrating tablet Place 1 tablet on the tongue every 8 hours if needed for Nausea/Vomiting. 30 tablet 3     pravastatin (PRAVACHOL) 20 mg tablet Take 1 tablet by mouth at bedtime. 90 tablet 3     sertraline (ZOLOFT) 100 mg tablet TAKE ONE TABLET BY MOUTH ONE TIME DAILY 30 tablet 0     sitaGLIPtin (JANUVIA) 100 mg tablet Take 1 tablet by mouth once daily. 90 tablet 3     traMADol (ULTRAM) 50 mg tablet Take 1 tablet by mouth 4 times daily. 120 tablet 5     No current facility-administered medications on file prior to visit.    ,   Past Medical History      Diagnosis   Date     Cancer of appendix (HC)       H/O      Colovesical fistula       history of, secondary to diverticulitis.      Diabetes mellitus (HC)       Type 2      Ventral hernia      and   Past Surgical History       Procedure   Laterality Date     Cyst removal        sebaceous, scalp       Colectomy   2006      Hemicolectomy with appendiceal cancer noted, mucinous type.        Lymph node dissection   2007     Hernia repair   03/08     Repair of surgical wound hernia, metastatic cancer incidentally noted.       Hx abdomen peritoneum omentum surgery   04/08     removal of left hemidiaphragm and omentum [Other] as well as intraperitoneal chemotherapy, 5 FU and Oxaliplatin for metastatic mucinous adenocarcinoma of the appendix.[[[       Hernia repair   04/09      Ventral hernia repair, recurrent appendiceal carcinoma, resection of 7 centimeter left upper quadrant tumor with splenectomy, Surgeon, Dr. Marks       Hernia repair   11/2001     Reduction of ventral hernia and mesh repair of hernia       Hernia repair   12/2012     Removal mesh with infection 12/12       Excision of finger mass   4/25/13     left thumb, Dr. Donald         REVIEW OF SYSTEMS:  ROS    OBJECTIVE:  /68  Pulse 66  Resp 16    EXAM:   Physical Exam   Constitutional: He is oriented to person, place, and time and well-developed, well-nourished, and in no distress. No distress.   HENT:   Head: Normocephalic and atraumatic.   Cardiovascular: Normal rate, regular rhythm and normal heart sounds.    Pulmonary/Chest: Effort normal. No respiratory distress. He has no wheezes. He has no rales.   Abdominal: Soft. He exhibits distension.   Very large non reducible hernia.   Neurological: He is alert and oriented to person, place, and time.   Skin: He is not diaphoretic.   Psychiatric: Memory, affect and judgment normal.       ASSESSMENT/PLAN:    ICD-10-CM    1. VENTRAL HERNIA WITH INCARCERATION K43.6    2. Controlled substance agreement signed Z79.899    3. HTN (hypertension), malignant I10    4. Uncontrolled type 2 diabetes mellitus without complication, without long-term current use of insulin (HC) E11.65    5. Ventral hernia with obstruction K43.6 oxyCODONE-acetaminophen, 5-325 mg, (PERCOCET) 5-325 mg per tablet   6. Travel advice encounter Z71.89 typhoid vaccin,live,attenuated (VIVOTIF) 2 billion unit capsule       TYPHOID VACCINE IM        Plan:   reviewed and is stable.  He is on a narcotic contract.  Given he takes it twice daily, I gave him #120 for the 2 month trip.  CDC website reviewed and he needs Typhoid but not yellow fever given where he is going.    Reviewed his last labs with him.  Is spilling more protein in urine, is on max does lisinopril  At 2000 mg then consult with nephrology.  Follow up A1c in 2 months or so, when back from [facility].    Mik Kumari MD ....................  2/14/2017   1:26 PM

## 2018-01-04 NOTE — TELEPHONE ENCOUNTER
Patient Information     Patient Name MRN Jaswant Dunham [id number] Male 1970      Telephone Encounter by Gosselin, Norma J at 2017  2:07 PM     Author:  Gosselin, Norma J Service:  (none) Author Type:  (none)     Filed:  2017  2:08 PM Encounter Date:  2017 Status:  Signed     :  Gosselin, Norma J            Patient notified by phone of antibiotic and refills.  Norma J Gosselin LPN....................  2017   2:08 PM

## 2018-01-04 NOTE — TELEPHONE ENCOUNTER
Patient Information     Patient Name MRN Jaswant Dunham [id number] Male 1970      Telephone Encounter by Jailyn Baker at 2017  1:52 PM     Author:  Jailyn Baker Service:  (none) Author Type:  (none)     Filed:  2017  1:55 PM Encounter Date:  2017 Status:  Signed     :  Jailyn Baker            Found an embedded wood tick in his arm that was there a long time, could he get an antibiotic faxed in  Jailyn Baker ....................  2017   1:54 PM

## 2018-01-04 NOTE — TELEPHONE ENCOUNTER
Patient Information     Patient Name MRN Jaswant Dunham [id number] Male 1970      Telephone Encounter by Jailyn Baker at 5/3/2017  9:08 AM     Author:  Jailyn Baker Service:  (none) Author Type:  (none)     Filed:  5/3/2017  9:10 AM Encounter Date:  5/3/2017 Status:  Signed     :  Jailyn Baker            Humalog is not covered by Insurance, Novolog Flex Pen is covered, could they get an RxJailyn ....................  5/3/2017   9:09 AM

## 2018-01-04 NOTE — TELEPHONE ENCOUNTER
Patient Information     Patient Name MRN Jaswant Dunham [id number] Male 1970      Telephone Encounter by Jailyn Baker at 2017  8:58 AM     Author:  Jailyn Baker Service:  (none) Author Type:  (none)     Filed:  2017  8:59 AM Encounter Date:  2017 Status:  Signed     :  Jailyn Baker            He also wants to get his Percocet filled  Jailyn Baker ....................  2017   8:58 AM

## 2018-01-04 NOTE — PROGRESS NOTES
Patient Information     Patient Name MRN Sex Jaswant Quevedo [id number] Male 1970      Progress Notes by Mik Kumari MD at 2017  9:15 AM     Author:  Mik Kumari MD Service:  (none) Author Type:  Physician     Filed:  2017 10:19 AM Encounter Date:  2017 Status:  Signed     :  Mik Kumari MD (Physician)            SUBJECTIVE:    Jaswant Chong is a 46 y.o. 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  He continues to have right medial knee pain, 8/10.  Says he aggravated it fording a stream there.  Can walk on flat ground without pain.  But feels really short of breath with climbing hills. [place]  Used 120 percocet tabs over the last 3 months.    Does not check sugars at all, but was doing over 20,000 steps daily there.  Is due for an A1c.    No Known Allergies,   Current Outpatient Prescriptions on File Prior to Visit       Medication  Sig Dispense Refill     amLODIPine (NORVASC) 10 mg tablet Take 1 tablet by mouth once daily. 90 tablet 3     blood-glucose meter Dispense meter, test strips, lancets covered by pt ins. E11.9 IDDM type II - Test 3 times/day. 1 Device 12     DME Blood pressure machine 1 Each 0     doxycycline (VIBRAMYCIN) 100 mg capsule 2 caps once 2 capsule 6     indomethacin (INDOCIN) 50 mg capsule Take 1 capsule by mouth 3 times daily with meals. 42 capsule 2     insulin aspart (NOVOLOG FLEXPEN) 100 unit/mL solution for injection Inject 5 Units subcutaneous 3 times daily before meals. 5 pen 3     insulin glargine (LANTUS SOLOSTAR PEN; BASAGLAR KWIKPEN) 100 unit/mL (3 mL) pen Inject 15 Units subcutaneous before bedtime. 1 box 12     lisinopril (PRINIVIL; ZESTRIL) 40 mg tablet Take 1 tablet by mouth once daily. 90 tablet 1     LORazepam (ATIVAN) 1 mg tablet Take 1 tablet by mouth every 6 hours if needed for Anxiety. 30 tablet 2     metFORMIN (GLUCOPHAGE) 1,000 mg tablet  Take 1 tablet by mouth 2 times daily with meals. 180 tablet 3     metoprolol succinate SR (TOPROL XL) 200 mg Sustained-Release tablet Take 1 tablet by mouth once daily. 90 tablet 3     ondansetron (ZOFRAN ODT) 4 mg disintegrating tablet Place 1 tablet on the tongue every 8 hours if needed for Nausea/Vomiting. 30 tablet 3     pravastatin (PRAVACHOL) 20 mg tablet Take 1 tablet by mouth at bedtime. 90 tablet 3     sertraline (ZOLOFT) 100 mg tablet Take 1 tablet by mouth once daily. 90 tablet 3     sitaGLIPtin (JANUVIA) 100 mg tablet Take 1 tablet by mouth once daily. 90 tablet 3     traMADol (ULTRAM) 50 mg tablet Take 1 tablet by mouth 4 times daily. 120 tablet 5     typhoid vaccin,live,attenuated (VIVOTIF) 2 billion unit capsule 1 capsule ORALLY x 4 doses, on days 1, 3, 5, and 7, to be completed at least one week prior to potential exposure 4 capsule 0     No current facility-administered medications on file prior to visit.    ,   Past Medical History:     Diagnosis  Date     Cancer of appendix (HC)     H/O      Colovesical fistula     history of, secondary to diverticulitis.      Diabetes mellitus (HC)     Type 2      Ventral hernia     and   Past Surgical History:      Procedure  Laterality Date     COLECTOMY  2006     Hemicolectomy with appendiceal cancer noted, mucinous type.       CYST REMOVAL      sebaceous, scalp       excision of finger mass  4/25/13    left thumb, Dr. Donald       HERNIA REPAIR  03/08    Repair of surgical wound hernia, metastatic cancer incidentally noted.       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 Marks       HERNIA REPAIR  11/2001    Reduction of ventral hernia and mesh repair of hernia       HERNIA REPAIR  12/2012    Removal mesh with infection 12/12       HX ABDOMEN PERITONEUM OMENTUM SURGERY  04/08    removal of left hemidiaphragm and omentum [Other] as well  as intraperitoneal chemotherapy, 5 FU and Oxaliplatin for metastatic mucinous adenocarcinoma of the appendix.[[[       LYMPH NODE DISSECTION  2007       REVIEW OF SYSTEMS:  Review of Systems   Constitutional: Negative for chills and fever.   Respiratory: Negative for cough and shortness of breath.    Cardiovascular: Negative for chest pain.   Gastrointestinal: Positive for abdominal pain.   Musculoskeletal: Positive for joint pain. Negative for falls.   Neurological: Negative for headaches.       OBJECTIVE:  /68  Resp 16  Wt (!) 147.4 kg (325 lb)  BMI 40.62 kg/m2    EXAM:   Physical Exam   Constitutional: He is oriented to person, place, and time and well-developed, well-nourished, and in no distress. No distress.   HENT:   Head: Normocephalic and atraumatic.   Pulmonary/Chest: Effort normal. No respiratory distress. He has no wheezes. He has no rales.   Abdominal: Soft. He exhibits distension.   Very large non reducible hernai   Neurological: He is alert and oriented to person, place, and time.   Skin: Skin is warm and dry. No rash noted. He is not diaphoretic. No erythema.   Psychiatric: Memory, affect and judgment normal.     R knee with mild pain in medial joint line and mild pain with medial Heriberto's.    X-ray shows mild right and moderate left medial compartment narrowing.  Hardware intact from ACL repair.    ASSESSMENT/PLAN:    ICD-10-CM    1. Uncontrolled type 2 diabetes mellitus without complication, with long-term current use of insulin (HC) E11.65 Hgb A1c     Z79.4 MICROALBUMIN RANDOM URINE      Hgb A1c      MICROALBUMIN RANDOM URINE   2. Ventral hernia with obstruction K43.6 oxyCODONE-acetaminophen, 5-325 mg, (PERCOCET) 5-325 mg per tablet      DISCONTINUED: oxyCODONE-acetaminophen, 5-325 mg, (PERCOCET) 5-325 mg per tablet      DISCONTINUED: oxyCODONE-acetaminophen, 5-325 mg, (PERCOCET) 5-325 mg per tablet   3. Post-traumatic osteoarthritis of right knee M17.31 oxyCODONE-acetaminophen, 5-325  mg, (PERCOCET) 5-325 mg per tablet      XR KNEE WB 1 VIEW AP BILATERAL AND 2 VIEWS RIGHT   4. HTN (hypertension), malignant I10 BASIC METABOLIC PANEL      BASIC METABOLIC PANEL        Plan:  I suspect the dyspnea on exertion was related to the altitude.  Regarding the knee, I suspect it is degenerative joint disease, given ACL repair and obesity.  A medial meniscal tear is also possible.  I offered him an x-ray.     reviewed and stable. Right refilled percocet for 3 months total.    Follow up in 3 months.      Mik Kumari MD ....................  5/5/2017   10:18 AM

## 2018-01-04 NOTE — TELEPHONE ENCOUNTER
Patient Information     Patient Name MRN Jaswant Dunham [id number] Male 1970      Telephone Encounter by Jailyn Baker at 5/3/2017 11:38 AM     Author:  Jailyn Baker  Service:  (none) Author Type:  (none)     Filed:  5/3/2017 11:40 AM  Encounter Date:  5/3/2017 Status:  Addendum     :  Jailyn Baker        Related Notes: Original Note by Jailyn Baker filed at 5/3/2017 11:39 AM            Trevin nolan [facility] said the long acting was covered, he said they need the short acting insulin  Jailyn Baker ....................  5/3/2017   11:40 AM

## 2018-01-04 NOTE — NURSING NOTE
Patient Information     Patient Name MRN Jaswant Dunham [id number] Male 1970      Nursing Note by Jailyn Baker at 2017  9:15 AM     Author:  Jailyn Baker Service:  (none) Author Type:  (none)     Filed:  2017  9:15 AM Encounter Date:  2017 Status:  Signed     :  Jailyn Baker            Coming in for a medication refill, and tick bite  Jailyn Baker ....................  2017   9:02 AM

## 2018-01-27 VITALS
HEART RATE: 62 BPM | HEIGHT: 75 IN | BODY MASS INDEX: 39.17 KG/M2 | DIASTOLIC BLOOD PRESSURE: 82 MMHG | SYSTOLIC BLOOD PRESSURE: 128 MMHG | WEIGHT: 315 LBS

## 2018-01-27 VITALS
WEIGHT: 315 LBS | RESPIRATION RATE: 16 BRPM | SYSTOLIC BLOOD PRESSURE: 126 MMHG | BODY MASS INDEX: 40.62 KG/M2 | DIASTOLIC BLOOD PRESSURE: 68 MMHG

## 2018-01-27 VITALS — SYSTOLIC BLOOD PRESSURE: 136 MMHG | RESPIRATION RATE: 16 BRPM | DIASTOLIC BLOOD PRESSURE: 70 MMHG

## 2018-01-27 VITALS — SYSTOLIC BLOOD PRESSURE: 126 MMHG | HEART RATE: 66 BPM | DIASTOLIC BLOOD PRESSURE: 66 MMHG | RESPIRATION RATE: 16 BRPM

## 2018-01-27 VITALS — RESPIRATION RATE: 16 BRPM | DIASTOLIC BLOOD PRESSURE: 68 MMHG | HEART RATE: 66 BPM | SYSTOLIC BLOOD PRESSURE: 128 MMHG

## 2018-02-12 NOTE — PROGRESS NOTES
Patient Information     Patient Name MRN Sex Jaswant Quevedo [id number] Male 1970      Progress Notes by Cha Vargas at 2017  2:57 PM     Author:  Cha Vargas Service:  (none) Author Type:  (none)     Filed:  2017  2:57 PM Date of Service:  2017  2:57 PM Status:  Signed     :  Cha Vargas            Falls Risk Criteria:    Age 65 and older or under age 4        Sensory deficits    Poor vision    Use of ambulatory aides    Impaired judgment    Unable to walk independently    Meets High Risk criteria for falls:  no

## 2018-02-12 NOTE — PROGRESS NOTES
Patient Information     Patient Name MRN Jaswant Dunham [id number] Male 1970      Progress Notes by Cha Vargas at 2017  2:57 PM     Author:  Cha Vargas Service:  (none) Author Type:  (none)     Filed:  2017  2:57 PM Date of Service:  2017  2:57 PM Status:  Signed     :  Cha Vargas            RECOVERY TIME  You may experience numbness and/or relief of your pain for up to 4-6 hours after the injection.  Your usual symptoms may return the night of the procedure and may possible be more severe than usual a day or two following.  Please keep track of your pain over the next several days and report how long the relief lasts to the doctor who referred you for this procedure.    The beneficial effects of the steroids usually require 2 to 3 days to take effect, buy may take as long as 5 to 7 days.  If there is no change in the pain, then investigation can be focused on other possible sources of your pain.  In either case, the information is useful to the doctor who referred you for this procedure.    POSSIBLE SIDE EFFECTS  Facial flushing (redness), occasional low grade fevers of 99.5F or less, hiccups, insomnia, headaches, increased heart rate, abdominal cramping, and/or a bloating feeling are side effects of the steroid medications and will go away 3 to 4 days after the injection.    Diabetic Patients  The steroids you have received may significantly increase your blood sugar levels.  Monitor your blood sugar level closely (4-6 times per day) for a period of 4 days or until your blood sugar level normalizes.  If your blood sugar level elevates significantly or you experience confusion, dizziness, sweating, please notify our primary physician and make him/her aware that you have received steroids.

## 2018-02-12 NOTE — PROGRESS NOTES
Patient Information     Patient Name MRN Sex Jaswant Quevedo [id number] Male 1970      Progress Notes by Cha Vargas at 2017  2:57 PM     Author:  Cha Vargas Service:  (none) Author Type:  (none)     Filed:  2017  2:57 PM Date of Service:  2017  2:57 PM Status:  Signed     :  Cha Vargas            [place] Protocol    A. Pre-procedure verification complete yes  1-relevant information / documentation available, reviewed and properly matched to the patient; 2-consent accurate and complete, 3-equipment and supplies available    B. Site marking complete Yes  Site marked if not in continuous attendance with patient    C. TIME OUT completed yes  Time Out was conducted just prior to starting procedure to verify the eight required elements: 1-patient identity, 2-consent accurate and complete, 3-position, 4-correct side/site marked (if applicable), 5-procedure, 6-relevant images / results properly labeled and displayed (if applicable), 7-antibiotics / irrigation fluids (if applicable), 8-safety precautions.

## 2018-02-27 ENCOUNTER — DOCUMENTATION ONLY (OUTPATIENT)
Dept: FAMILY MEDICINE | Facility: OTHER | Age: 48
End: 2018-02-27

## 2018-02-27 PROBLEM — E66.9 OBESITY: Status: ACTIVE | Noted: 2018-02-27

## 2018-02-27 PROBLEM — N41.1 PROSTATITIS, CHRONIC: Status: ACTIVE | Noted: 2018-02-27

## 2018-02-27 PROBLEM — M17.31 POST-TRAUMATIC OSTEOARTHRITIS OF RIGHT KNEE: Status: ACTIVE | Noted: 2017-05-05

## 2018-02-27 RX ORDER — LISINOPRIL 40 MG/1
40 TABLET ORAL DAILY
COMMUNITY
Start: 2017-09-12 | End: 2018-05-30

## 2018-02-27 RX ORDER — INSULIN PUMP SYRINGE, 3 ML
EACH MISCELLANEOUS 3 TIMES DAILY
COMMUNITY
Start: 2017-06-12 | End: 2018-11-30

## 2018-02-27 RX ORDER — SERTRALINE HYDROCHLORIDE 100 MG/1
100 TABLET, FILM COATED ORAL DAILY
COMMUNITY
Start: 2017-09-12 | End: 2018-05-30

## 2018-02-27 RX ORDER — PRAVASTATIN SODIUM 20 MG
20 TABLET ORAL AT BEDTIME
COMMUNITY
Start: 2017-09-12 | End: 2018-05-31

## 2018-02-27 RX ORDER — LANCETS 23 GAUGE
EACH MISCELLANEOUS
COMMUNITY
Start: 2017-06-09 | End: 2023-03-30

## 2018-02-27 RX ORDER — LIRAGLUTIDE 6 MG/ML
0.6 INJECTION SUBCUTANEOUS DAILY
COMMUNITY
Start: 2017-09-11 | End: 2018-05-30

## 2018-02-27 RX ORDER — LORAZEPAM 1 MG/1
1 TABLET ORAL EVERY 6 HOURS PRN
COMMUNITY
Start: 2017-11-06 | End: 2018-05-30

## 2018-02-27 RX ORDER — BLOOD PRESSURE TEST KIT
KIT MISCELLANEOUS
COMMUNITY
Start: 2016-11-08

## 2018-02-27 RX ORDER — PEN NEEDLE, DIABETIC 30 GX3/16"
NEEDLE, DISPOSABLE MISCELLANEOUS
COMMUNITY
Start: 2017-06-09 | End: 2018-06-16

## 2018-02-27 RX ORDER — INDOMETHACIN 50 MG/1
1 CAPSULE ORAL
COMMUNITY
Start: 2017-11-14 | End: 2018-05-30

## 2018-02-27 RX ORDER — ONDANSETRON 4 MG/1
4 TABLET, ORALLY DISINTEGRATING ORAL EVERY 8 HOURS PRN
COMMUNITY
Start: 2016-03-07 | End: 2018-05-30

## 2018-02-27 RX ORDER — METOPROLOL SUCCINATE 200 MG/1
200 TABLET, EXTENDED RELEASE ORAL DAILY
COMMUNITY
Start: 2017-09-12 | End: 2018-05-30

## 2018-02-27 RX ORDER — AMLODIPINE BESYLATE 10 MG/1
10 TABLET ORAL DAILY
COMMUNITY
Start: 2017-09-12 | End: 2018-05-30

## 2018-02-27 RX ORDER — OXYCODONE AND ACETAMINOPHEN 5; 325 MG/1; MG/1
1-2 TABLET ORAL EVERY 6 HOURS PRN
COMMUNITY
Start: 2017-11-16 | End: 2018-05-30

## 2018-02-27 RX ORDER — TRAMADOL HYDROCHLORIDE 50 MG/1
50 TABLET ORAL 4 TIMES DAILY
COMMUNITY
Start: 2017-09-12 | End: 2018-05-30

## 2018-05-30 ENCOUNTER — OFFICE VISIT (OUTPATIENT)
Dept: FAMILY MEDICINE | Facility: OTHER | Age: 48
End: 2018-05-30
Attending: FAMILY MEDICINE
Payer: COMMERCIAL

## 2018-05-30 ENCOUNTER — MYC MEDICAL ADVICE (OUTPATIENT)
Dept: FAMILY MEDICINE | Facility: OTHER | Age: 48
End: 2018-05-30

## 2018-05-30 VITALS
WEIGHT: 306.4 LBS | SYSTOLIC BLOOD PRESSURE: 126 MMHG | DIASTOLIC BLOOD PRESSURE: 68 MMHG | RESPIRATION RATE: 16 BRPM | BODY MASS INDEX: 38.1 KG/M2 | HEART RATE: 66 BPM | HEIGHT: 75 IN

## 2018-05-30 DIAGNOSIS — C18.1 MALIGNANT NEOPLASM OF APPENDIX VERMIFORMIS (H): ICD-10-CM

## 2018-05-30 DIAGNOSIS — I10 HTN (HYPERTENSION), MALIGNANT: ICD-10-CM

## 2018-05-30 LAB
ALBUMIN SERPL-MCNC: 4.2 G/DL (ref 3.5–5.7)
ALP SERPL-CCNC: 130 U/L (ref 34–104)
ALT SERPL W P-5'-P-CCNC: 56 U/L (ref 7–52)
ANION GAP SERPL CALCULATED.3IONS-SCNC: 9 MMOL/L (ref 3–14)
AST SERPL W P-5'-P-CCNC: 42 U/L (ref 13–39)
BILIRUB SERPL-MCNC: 0.6 MG/DL (ref 0.3–1)
BUN SERPL-MCNC: 13 MG/DL (ref 7–25)
CALCIUM SERPL-MCNC: 9.3 MG/DL (ref 8.6–10.3)
CHLORIDE SERPL-SCNC: 95 MMOL/L (ref 98–107)
CHOLEST SERPL-MCNC: 235 MG/DL
CO2 SERPL-SCNC: 28 MMOL/L (ref 21–31)
CREAT SERPL-MCNC: 0.88 MG/DL (ref 0.7–1.3)
CREAT UR-MCNC: 190 MG/DL
GFR SERPL CREATININE-BSD FRML MDRD: >90 ML/MIN/1.7M2
GLUCOSE SERPL-MCNC: 330 MG/DL (ref 70–105)
HBA1C MFR BLD: 13.6 % (ref 4–6)
HDLC SERPL-MCNC: 31 MG/DL (ref 23–92)
LDLC SERPL CALC-MCNC: 140 MG/DL
MICROALBUMIN UR-MCNC: 1570 MG/L
MICROALBUMIN/CREAT UR: 826.32 MG/G CR (ref 0–17)
NONHDLC SERPL-MCNC: 204 MG/DL
POTASSIUM SERPL-SCNC: 4 MMOL/L (ref 3.5–5.1)
PROT SERPL-MCNC: 7.5 G/DL (ref 6.4–8.9)
SODIUM SERPL-SCNC: 132 MMOL/L (ref 134–144)
TRIGL SERPL-MCNC: 318 MG/DL

## 2018-05-30 PROCEDURE — 36415 COLL VENOUS BLD VENIPUNCTURE: CPT | Performed by: FAMILY MEDICINE

## 2018-05-30 PROCEDURE — G0463 HOSPITAL OUTPT CLINIC VISIT: HCPCS | Performed by: FAMILY MEDICINE

## 2018-05-30 PROCEDURE — 80307 DRUG TEST PRSMV CHEM ANLYZR: CPT | Performed by: FAMILY MEDICINE

## 2018-05-30 PROCEDURE — 80061 LIPID PANEL: CPT | Performed by: FAMILY MEDICINE

## 2018-05-30 PROCEDURE — 82043 UR ALBUMIN QUANTITATIVE: CPT | Performed by: FAMILY MEDICINE

## 2018-05-30 PROCEDURE — 99215 OFFICE O/P EST HI 40 MIN: CPT | Performed by: FAMILY MEDICINE

## 2018-05-30 PROCEDURE — 80053 COMPREHEN METABOLIC PANEL: CPT | Performed by: FAMILY MEDICINE

## 2018-05-30 PROCEDURE — 83036 HEMOGLOBIN GLYCOSYLATED A1C: CPT | Performed by: FAMILY MEDICINE

## 2018-05-30 RX ORDER — TRAMADOL HYDROCHLORIDE 50 MG/1
50 TABLET ORAL 4 TIMES DAILY
Qty: 90 TABLET | Refills: 3 | Status: SHIPPED | OUTPATIENT
Start: 2018-05-30 | End: 2018-11-30

## 2018-05-30 RX ORDER — LIRAGLUTIDE 6 MG/ML
0.6 INJECTION SUBCUTANEOUS DAILY
Qty: 9 ML | Refills: 3 | Status: SHIPPED | OUTPATIENT
Start: 2018-05-30 | End: 2018-11-30

## 2018-05-30 RX ORDER — OXYCODONE AND ACETAMINOPHEN 5; 325 MG/1; MG/1
1 TABLET ORAL EVERY 4 HOURS PRN
Qty: 60 TABLET | Refills: 0 | Status: SHIPPED | OUTPATIENT
Start: 2018-05-30 | End: 2018-07-19

## 2018-05-30 ASSESSMENT — ANXIETY QUESTIONNAIRES
1. FEELING NERVOUS, ANXIOUS, OR ON EDGE: NOT AT ALL
GAD7 TOTAL SCORE: 0
2. NOT BEING ABLE TO STOP OR CONTROL WORRYING: NOT AT ALL
6. BECOMING EASILY ANNOYED OR IRRITABLE: NOT AT ALL
7. FEELING AFRAID AS IF SOMETHING AWFUL MIGHT HAPPEN: NOT AT ALL
IF YOU CHECKED OFF ANY PROBLEMS ON THIS QUESTIONNAIRE, HOW DIFFICULT HAVE THESE PROBLEMS MADE IT FOR YOU TO DO YOUR WORK, TAKE CARE OF THINGS AT HOME, OR GET ALONG WITH OTHER PEOPLE: NOT DIFFICULT AT ALL
3. WORRYING TOO MUCH ABOUT DIFFERENT THINGS: NOT AT ALL
5. BEING SO RESTLESS THAT IT IS HARD TO SIT STILL: NOT AT ALL

## 2018-05-30 ASSESSMENT — ENCOUNTER SYMPTOMS
FREQUENCY: 1
DYSURIA: 0
FEVER: 0
FATIGUE: 0
SHORTNESS OF BREATH: 0
COUGH: 0
ABDOMINAL PAIN: 1
DIFFICULTY URINATING: 0
POLYPHAGIA: 1

## 2018-05-30 ASSESSMENT — PAIN SCALES - GENERAL: PAINLEVEL: MODERATE PAIN (4)

## 2018-05-30 ASSESSMENT — PATIENT HEALTH QUESTIONNAIRE - PHQ9: 5. POOR APPETITE OR OVEREATING: NOT AT ALL

## 2018-05-30 NOTE — PROGRESS NOTES
"  SUBJECTIVE:   Jaswant Chong is a 47 year old male who presents to clinic today for the following health issues:    HPI  Follow up on multiple medical issues.  Has not had an A1c for over 6 months.  Last lipid was in 2016.    He was in [place] a few months ago, stopped all of his meds.  On no meds at all since March 15.  Had a headache then, and he researched the meds online and found that many of the meds had headache as a side effects.  Significant polyuria and polydipsia now.  Walks 6-7 miles daily.  Eating lots of fruit.  Does not check his sugars.  He feels overall he feels a lot better off meds completely, apart from ongoing struggles with his marriage.  Nocturia up to 9 times.      He overall says he is not feeling down or depressed currently.  Feels like he is managing the stress of his marriage reasonably well.  Wife has an apartment in [place] now, but he spends time there with her.  Feels her mood is \"still very up and down\".    Percocet perhaps 3-4 times a week. In the evening after heavy work.  Ultram perhaps 6 total weekly.  This is for the abdomen pains, after his cancer surgery.  Pains worse with laying flat on back.  Has a large hernia.     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       Obesity 02/27/2018     Priority: Medium     Overview:   BMI 37       Prostatitis, chronic 02/27/2018     Priority: Medium     Post-traumatic osteoarthritis of right knee 05/05/2017     Priority: Medium     Uncontrolled diabetes mellitus type 2 without complications (H) 12/20/2016     Priority: Medium     Adrenal mass, right (H) 07/21/2016     Priority: Medium     HTN (hypertension), malignant 07/21/2016     Priority: Medium     Fatty liver 12/08/2015     Priority: Medium     " Post-splenectomy 12/08/2015     Priority: Medium     Recurrent ventral hernia 09/14/2015     Priority: Medium     Controlled substance agreement signed 08/13/2015     Priority: Medium     Major depressive disorder, recurrent episode, moderate (H) 08/13/2015     Priority: Medium     Degenerative joint disease (DJD) of hip 02/06/2015     Priority: Medium     Hernia 12/03/2012     Priority: Medium     Abdominal pain 02/16/2012     Priority: Medium     Cellulitis and abscess of leg, except foot 01/19/2012     Priority: Medium     Cellulitis and abscess of trunk 12/22/2011     Priority: Medium     Seroma complicating a procedure 12/20/2011     Priority: Medium     Diabetes mellitus without complication (H) 12/07/2011     Priority: Medium     Ventral hernia with obstruction 12/06/2011     Priority: Medium     Varicocele 05/03/2011     Priority: Medium     Malignant neoplasm of appendix vermiformis (H) 04/15/2011     Priority: Medium     Hyperlipidemia 10/01/2010     Priority: Medium     Microalbuminuria 10/01/2010     Priority: Medium     Past Medical History:   Diagnosis Date     Malignant neoplasm of appendix (H)     H/O     Type 2 diabetes mellitus without complications (H)     Type 2     Ventral hernia without obstruction or gangrene     No Comments Provided     Vesicointestinal fistula     history of, secondary to diverticulitis.      Past Surgical History:   Procedure Laterality Date     COLON SURGERY      2006, Hemicolectomy with appendiceal cancer noted, mucinous type.     EXCISE CYST GENERIC (LOCATION)      sebaceous, scalp     OTHER SURGICAL HISTORY      2007,[id number],LYMPH NODE DISSECTION     OTHER SURGICAL HISTORY      03/08,,HERNIA REPAIR,Repair of surgical wound hernia, metastatic cancer incidentally noted.     OTHER SURGICAL HISTORY      04/08,65531.9,HX ABDOMEN PERITONEUM OMENTUM SURGERY,removal of left hemidiaphragm and omentum [Other] as well as intraperitoneal chemotherapy, 5 FU and Oxaliplatin for  metastatic mucinous adenocarcinoma of the appendix.[[[     OTHER SURGICAL HISTORY      04/09,,HERNIA REPAIR, Ventral hernia repair, recurrent appendiceal carcinoma, resection of 7 centimeter left upper quadrant tumor with splenectomy, Surgeon, Dr. Mraks     OTHER SURGICAL HISTORY      11/2001,,HERNIA REPAIR,Reduction of ventral hernia and mesh repair of hernia     OTHER SURGICAL HISTORY      12/2012,,HERNIA REPAIR,Removal mesh with infection 12/12     OTHER SURGICAL HISTORY      4/25/13,700151,OTHER,left thumb, Dr. Donald     Social History   Substance Use Topics     Smoking status: Never Smoker     Smokeless tobacco: Never Used     Alcohol use 0.0 oz/week      Comment: Alcoholic Drinks/day: social     Current Outpatient Prescriptions   Medication Sig Dispense Refill     amLODIPine (NORVASC) 10 MG tablet Take 10 mg by mouth daily       blood glucose monitoring (COOL BLOOD GLUCOSE TEST STRIPS) test strip Dispense item covered by pt ins. E11.9 NIDDM type II - Test 3 times/day, Reason: Unstable diabetes       Blood Glucose Monitoring Suppl (FIFTY50 GLUCOSE METER 2.0) W/DEVICE KIT 3 times daily       Blood Pressure KIT Blood pressure machine       indomethacin (INDOCIN) 50 MG capsule Take 1 capsule by mouth 3 times daily (with meals)       insulin aspart (NOVOLOG PEN) 100 UNIT/ML injection Inject 5 Units Subcutaneous 3 times daily (before meals)       insulin glargine (LANTUS) 100 UNIT/ML injection Inject 15 Units Subcutaneous At Bedtime       Insulin Pen Needle (PEN NEEDLES) 32G X 4 MM MISC As directed. For administering insulin at home.       lancets 28G MISC Dispense item covered by pt ins. E11.9 NIDDM type II - Test 3 times/day, Reason: Unstable diabetes       liraglutide (VICTOZA PEN) 18 MG/3ML soln Inject 0.6 mg Subcutaneous daily       lisinopril (PRINIVIL,ZESTRIL) 10 MG tablet Take 10 mg by mouth daily.       lisinopril (PRINIVIL/ZESTRIL) 40 MG tablet Take 40 mg by mouth daily       LORazepam  "(ATIVAN) 1 MG tablet Take 1 mg by mouth every 6 hours as needed for anxiety       metFORMIN (GLUCOPHAGE) 1000 MG tablet Take 1,000 mg by mouth 2 times daily (with meals)       METFORMIN HCL PO Take 1 tablet by mouth 2 times daily.       metoprolol succinate (TOPROL-XL) 200 MG 24 hr tablet Take 200 mg by mouth daily       ondansetron (ZOFRAN-ODT) 4 MG ODT tab Place 4 mg under the tongue every 8 hours as needed for nausea or vomiting       oxyCODONE-acetaminophen (PERCOCET) 5-325 MG per tablet Take 1-2 tablets by mouth every 6 hours as needed       oxyCODONE-acetaminophen (PERCOCET) 5-325 MG per tablet Take  by mouth every 4 hours as needed.       pravastatin (PRAVACHOL) 20 MG tablet Take 20 mg by mouth At Bedtime       sertraline (ZOLOFT) 100 MG tablet Take 100 mg by mouth daily       simvastatin (ZOCOR) 20 MG tablet Take  by mouth At Bedtime.       traMADol (ULTRAM) 50 MG tablet Take 50 mg by mouth 4 times daily       tramadol (ULTRAM) 50 MG tablet Take 50 mg by mouth every 6 hours as needed        typhoid (VIVOTIF) CR capsule 1 capsule ORALLY x 4 doses, on days 1, 3, 5, and 7, to be completed at least one week prior to potential exposure       No Known Allergies    Review of Systems   Constitutional: Negative for fatigue and fever.   Respiratory: Negative for cough and shortness of breath.    Cardiovascular: Negative for chest pain.   Gastrointestinal: Positive for abdominal pain.   Endocrine: Positive for polyphagia and polyuria.   Genitourinary: Positive for frequency. Negative for difficulty urinating and dysuria.        OBJECTIVE:     /68 (BP Location: Right arm, Patient Position: Sitting, Cuff Size: Adult Regular)  Pulse 66  Resp 16  Ht 6' 3\" (1.905 m)  Wt 306 lb 6.4 oz (139 kg)  BMI 38.3 kg/m2  Body mass index is 38.3 kg/(m^2).  Physical Exam   Constitutional: He is oriented to person, place, and time. He appears well-developed. No distress.   HENT:   Head: Normocephalic and atraumatic.   Eyes: " Conjunctivae are normal. Pupils are equal, round, and reactive to light.   Neck: Normal range of motion. No thyromegaly present.   Cardiovascular: Normal rate, regular rhythm and normal heart sounds.  Exam reveals no gallop and no friction rub.    No murmur heard.  Pulmonary/Chest: Effort normal. No respiratory distress. He has no wheezes. He has no rales.   Abdominal: Soft. He exhibits distension. There is tenderness.   Extremely large non reducible abdomen hernia.   Musculoskeletal: He exhibits no edema.   Neurological: He is alert and oriented to person, place, and time.   Skin: Skin is warm and dry. No rash noted. He is not diaphoretic. No erythema.   Psychiatric: He has a normal mood and affect. His behavior is normal. Thought content normal.       Diagnostic Test Results:  Results for orders placed or performed in visit on 05/30/18 (from the past 24 hour(s))   Hemoglobin A1c   Result Value Ref Range    Hemoglobin A1C 13.6 (H) 4.0 - 6.0 %   Lipid Panel   Result Value Ref Range    Cholesterol 235 (H) <200 mg/dL    Triglycerides 318 (H) <150 mg/dL    HDL Cholesterol 31 23 - 92 mg/dL    LDL Cholesterol Calculated 140 (H) <100 mg/dL    Non HDL Cholesterol 204 (H) <130 mg/dL   Comprehensive metabolic panel   Result Value Ref Range    Sodium 132 (L) 134 - 144 mmol/L    Potassium 4.0 3.5 - 5.1 mmol/L    Chloride 95 (L) 98 - 107 mmol/L    Carbon Dioxide 28 21 - 31 mmol/L    Anion Gap 9 3 - 14 mmol/L    Glucose 330 (H) 70 - 105 mg/dL    Urea Nitrogen 13 7 - 25 mg/dL    Creatinine 0.88 0.70 - 1.30 mg/dL    GFR Estimate >90 >60 mL/min/1.7m2    GFR Estimate If Black >90 >60 mL/min/1.7m2    Calcium 9.3 8.6 - 10.3 mg/dL    Bilirubin Total 0.6 0.3 - 1.0 mg/dL    Albumin 4.2 3.5 - 5.7 g/dL    Protein Total 7.5 6.4 - 8.9 g/dL    Alkaline Phosphatase 130 (H) 34 - 104 U/L    ALT 56 (H) 7 - 52 U/L    AST 42 (H) 13 - 39 U/L       ASSESSMENT/PLAN:         (E11.65) Uncontrolled type 2 diabetes mellitus without complication, without  "long-term current use of insulin (H)  (primary encounter diagnosis)  Comment: worse  Plan: Hemoglobin A1c, Lipid Panel, Albumin Random         Urine Quantitative with Creat Ratio,         liraglutide (VICTOZA PEN) 18 MG/3ML soln        This is challenging in that he had actually stopped all of his meds.  I tried to negotiate with him to see which ones he would be willing to get back on and for now, really, only the victoza.  He still jokes about having \"pre\" diabetes, and seems to ignore his own health when there is significant psychosocial stress in his life.  I did try to help him focus on his own health a bit more, but it is not clear how successful I was.    (I10) HTN (hypertension), malignant  Comment: stable  Plan: Comprehensive metabolic panel        Again, is off meds and reading today is normal.  Perhaps the weight loss has helped this problem.    (C18.1) Malignant neoplasm of appendix vermiformis (H)  Comment: stable  Plan: Comprehensive metabolic panel, Drug  Screen         Comprehensive , Urine with Reported Meds         (MedTox) (Pain Care Package), traMADol (ULTRAM)        50 MG tablet, oxyCODONE-acetaminophen         (PERCOCET) 5-325 MG per tablet        Significant pain from the ventral hernia.  He reports using minimal percocet now, I did fill for #60, just a single prescription.  This is as much to have him come back for follow up as anything else.      45 minutes with him, over 1/2 in counseling.    Mik Kumari MD  [facility] AND [facility]    "

## 2018-05-30 NOTE — MR AVS SNAPSHOT
"              After Visit Summary   5/30/2018    Jaswant Chong    MRN: [id number]           Patient Information     Date Of Birth          1970        Visit Information        Provider Department      5/30/2018 10:30 AM Mik Kumari MD [facility]        Today's Diagnoses     Uncontrolled type 2 diabetes mellitus without complication, without long-term current use of insulin (H)    -  1    HTN (hypertension), malignant        Malignant neoplasm of appendix vermiformis (H)           Follow-ups after your visit        Follow-up notes from your care team     Return in about 3 months (around 8/30/2018).      Who to contact     If you have questions or need follow up information about today's clinic visit or your schedule please contact [facility] directly at [phone].  Normal or non-critical lab and imaging results will be communicated to you by Ioxushart, letter or phone within 4 business days after the clinic has received the results. If you do not hear from us within 7 days, please contact the clinic through MyChart or phone. If you have a critical or abnormal lab result, we will notify you by phone as soon as possible.  Submit refill requests through [employer]chmaschine publishing or call your pharmacy and they will forward the refill request to us. Please allow 3 business days for your refill to be completed.          Additional Information About Your Visit        Ioxushart Information     [employer] lets you send messages to your doctor, view your test results, renew your prescriptions, schedule appointments and more. To sign up, go to [email or web address] . Click on \"Log in\" on the left side of the screen, which will take you to the Welcome page. Then click on \"Sign up Now\" on the right side of the page.     You will be asked to enter the access code listed below, as well as some personal information. Please follow the directions to create your username and password.     Your access " "code is: [email or web address]  Expires: 2018 11:00 AM     Your access code will  in 90 days. If you need help or a new code, please call your [place] clinic or [phone].        Care EveryWhere ID     This is your Care EveryWhere ID. This could be used by other organizations to access your [place] medical records  [id number]        Your Vitals Were     Pulse Respirations Height BMI (Body Mass Index)          66 16 6' 3\" (1.905 m) 38.3 kg/m2         Blood Pressure from Last 3 Encounters:   18 126/68   17 128/82   17 126/66    Weight from Last 3 Encounters:   18 306 lb 6.4 oz (139 kg)   17 (!) 320 lb 4 oz (145.3 kg)   17 (!) 325 lb (147.4 kg)              We Performed the Following     Albumin Random Urine Quantitative with Creat Ratio     Comprehensive metabolic panel     Drug  Screen Comprehensive , Urine with Reported Meds (MedTox) (Pain Care Package)     Hemoglobin A1c     Lipid Panel          Today's Medication Changes          These changes are accurate as of 18 11:00 AM.  If you have any questions, ask your nurse or doctor.               These medicines have changed or have updated prescriptions.        Dose/Directions    metFORMIN 1000 MG tablet   Commonly known as:  GLUCOPHAGE   This may have changed:  Another medication with the same name was removed. Continue taking this medication, and follow the directions you see here.   Changed by:  Mik Kumari MD        Dose:  1000 mg   Take 1,000 mg by mouth 2 times daily (with meals)   Refills:  0       oxyCODONE-acetaminophen 5-325 MG per tablet   Commonly known as:  PERCOCET   This may have changed:    - how much to take  - Another medication with the same name was removed. Continue taking this medication, and follow the directions you see here.   Used for:  Malignant neoplasm of appendix vermiformis (H)   Changed by:  Mik Kumari MD        Dose:  1 tablet   Take 1 tablet by mouth every 4 hours as needed "   Quantity:  60 tablet   Refills:  0         Stop taking these medicines if you haven't already. Please contact your care team if you have questions.     amLODIPine 10 MG tablet   Commonly known as:  NORVASC   Stopped by:  Mik Kumari MD           indomethacin 50 MG capsule   Commonly known as:  INDOCIN   Stopped by:  Mik Kumari MD           lisinopril 10 MG tablet   Commonly known as:  PRINIVIL/ZESTRIL   Stopped by:  Mik Kumari MD           lisinopril 40 MG tablet   Commonly known as:  PRINIVIL/ZESTRIL   Stopped by:  Mik Kumari MD           LORazepam 1 MG tablet   Commonly known as:  ATIVAN   Stopped by:  Mik Kumari MD           metoprolol succinate 200 MG 24 hr tablet   Commonly known as:  TOPROL-XL   Stopped by:  Mik Kumari MD           ondansetron 4 MG ODT tab   Commonly known as:  ZOFRAN-ODT   Stopped by:  Mik Kumari MD           sertraline 100 MG tablet   Commonly known as:  ZOLOFT   Stopped by:  Mik Kumari MD           simvastatin 20 MG tablet   Commonly known as:  ZOCOR   Stopped by:  Mik Kumari MD           typhoid CR capsule   Commonly known as:  VIVOTIF   Stopped by:  Mik Kumari MD                Where to get your medicines      These medications were sent to [facility] [address] [place], [place] - [address] [facility] [address]     Phone:  [phone]     liraglutide 18 MG/3ML soln         Some of these will need a paper prescription and others can be bought over the counter.  Ask your nurse if you have questions.     Bring a paper prescription for each of these medications     oxyCODONE-acetaminophen 5-325 MG per tablet    traMADol 50 MG tablet               Information about OPIOIDS     PRESCRIPTION OPIOIDS: WHAT YOU NEED TO KNOW   You have a prescription for an opioid (narcotic) pain medicine. Opioids can cause addiction. If you have a history of chemical dependency of any type, you are at a higher risk  of becoming addicted to opioids. Only take this medicine after all other options have been tried. Take it for as short a time and as few doses as possible.     Do not:    Drive. If you drive while taking these medicines, you could be arrested for driving under the influence (DUI).    Operate heavy machinery    Do any other dangerous activities while taking these medicines.     Drink any alcohol while taking these medicines.      Take with any other medicines that contain acetaminophen. Read all labels carefully. Look for the word  acetaminophen  or  Tylenol.  Ask your pharmacist if you have questions or are unsure.    Store your pills in a secure place, locked if possible. We will not replace any lost or stolen medicine. If you don t finish your medicine, please throw away (dispose) as directed by your pharmacist. The [place] Pollution Control Agency has more information about safe disposal: https://www.pca.[facility].[place].us/living-green/managing-unwanted-medications    All opioids tend to cause constipation. Drink plenty of water and eat foods that have a lot of fiber, such as fruits, vegetables, prune juice, apple juice and high-fiber cereal. Take a laxative (Miralax, milk of magnesia, Colace, Senna) if you don t move your bowels at least every other day.          Primary Care Provider Office Phone # Fax #    Mik Ledbetter, PhD  [phone] [address] [address]        Equal Access to Services     JOY KEY : Hadii jones briscoe Sodaisy, waaxda luqadaha, qaybta kaalmayvonne dinh. So [facility] [phone].    ATENCIÓN: Si habla español, tiene a aden disposición servicios gratuitos de asistencia lingüística. Llame al [phone].    We comply with applicable federal civil rights laws and [place] laws. We do not discriminate on the basis of race, color, national origin, age, disability, sex, sexual orientation, or gender  identity.            Thank you!     Thank you for choosing [facility] AND [facility]  for your care. Our goal is always to provide you with excellent care. Hearing back from our patients is one way we can continue to improve our services. Please take a few minutes to complete the written survey that you may receive in the mail after your visit with us. Thank you!             Your Updated Medication List - Protect others around you: Learn how to safely use, store and throw away your medicines at www.disposemymeds.org.          This list is accurate as of 5/30/18 11:00 AM.  Always use your most recent med list.                   Brand Name Dispense Instructions for use Diagnosis    Blood Pressure Kit      Blood pressure machine        COOL BLOOD GLUCOSE TEST STRIPS test strip   Generic drug:  blood glucose monitoring      Dispense item covered by pt ins. E11.9 NIDDM type II - Test 3 times/day, Reason: Unstable diabetes        FIFTY50 GLUCOSE METER 2.0 w/Device Kit      3 times daily        insulin aspart 100 UNIT/ML injection    NovoLOG PEN     Inject 5 Units Subcutaneous 3 times daily (before meals)        insulin glargine 100 UNIT/ML injection    LANTUS     Inject 15 Units Subcutaneous At Bedtime        lancets 28G Misc      Dispense item covered by pt ins. E11.9 NIDDM type II - Test 3 times/day, Reason: Unstable diabetes        liraglutide 18 MG/3ML soln    VICTOZA PEN    9 mL    Inject 0.6 mg Subcutaneous daily    Uncontrolled type 2 diabetes mellitus without complication, without long-term current use of insulin (H)       metFORMIN 1000 MG tablet    GLUCOPHAGE     Take 1,000 mg by mouth 2 times daily (with meals)        oxyCODONE-acetaminophen 5-325 MG per tablet    PERCOCET    60 tablet    Take 1 tablet by mouth every 4 hours as needed    Malignant neoplasm of appendix vermiformis (H)       Pen Needles 32G X 4 MM Misc      As directed. For administering insulin at home.        pravastatin 20 MG tablet     PRAVACHOL     Take 20 mg by mouth At Bedtime        * traMADol 50 MG tablet    ULTRAM     Take 50 mg by mouth every 6 hours as needed        * traMADol 50 MG tablet    ULTRAM    90 tablet    Take 1 tablet (50 mg) by mouth 4 times daily    Malignant neoplasm of appendix vermiformis (H)       * Notice:  This list has 2 medication(s) that are the same as other medications prescribed for you. Read the directions carefully, and ask your doctor or other care provider to review them with you.

## 2018-05-31 RX ORDER — PRAVASTATIN SODIUM 20 MG
20 TABLET ORAL AT BEDTIME
Qty: 90 TABLET | Refills: 3 | Status: SHIPPED | OUTPATIENT
Start: 2018-05-31 | End: 2018-11-30

## 2018-05-31 RX ORDER — LISINOPRIL 40 MG/1
40 TABLET ORAL DAILY
Qty: 90 TABLET | Refills: 3 | Status: SHIPPED | OUTPATIENT
Start: 2018-05-31 | End: 2018-11-30

## 2018-05-31 ASSESSMENT — ANXIETY QUESTIONNAIRES: GAD7 TOTAL SCORE: 0

## 2018-06-02 LAB — PAIN DRUG SCR UR W RPTD MEDS: NORMAL

## 2018-06-19 RX ORDER — PEN NEEDLE, DIABETIC 32GX 5/32"
NEEDLE, DISPOSABLE MISCELLANEOUS
Qty: 300 EACH | Refills: 3 | Status: SHIPPED | OUTPATIENT
Start: 2018-06-19 | End: 2020-12-17

## 2018-06-19 NOTE — TELEPHONE ENCOUNTER
Prescription approved per [facility] Refill Protocol.  Amy Xiong RN.............................6/19/2018 10:58 AM

## 2018-06-25 ENCOUNTER — MYC MEDICAL ADVICE (OUTPATIENT)
Dept: FAMILY MEDICINE | Facility: OTHER | Age: 48
End: 2018-06-25

## 2018-07-05 ENCOUNTER — TELEPHONE (OUTPATIENT)
Dept: FAMILY MEDICINE | Facility: OTHER | Age: 48
End: 2018-07-05

## 2018-07-06 ENCOUNTER — OFFICE VISIT (OUTPATIENT)
Dept: FAMILY MEDICINE | Facility: OTHER | Age: 48
End: 2018-07-06
Attending: FAMILY MEDICINE
Payer: COMMERCIAL

## 2018-07-06 VITALS — SYSTOLIC BLOOD PRESSURE: 154 MMHG | DIASTOLIC BLOOD PRESSURE: 98 MMHG | RESPIRATION RATE: 16 BRPM

## 2018-07-06 DIAGNOSIS — R07.89 OTHER CHEST PAIN: Primary | ICD-10-CM

## 2018-07-06 DIAGNOSIS — I10 HTN (HYPERTENSION), MALIGNANT: ICD-10-CM

## 2018-07-06 PROCEDURE — 93010 ELECTROCARDIOGRAM REPORT: CPT | Performed by: INTERNAL MEDICINE

## 2018-07-06 PROCEDURE — G0463 HOSPITAL OUTPT CLINIC VISIT: HCPCS

## 2018-07-06 PROCEDURE — 99214 OFFICE O/P EST MOD 30 MIN: CPT | Mod: 25 | Performed by: FAMILY MEDICINE

## 2018-07-06 PROCEDURE — G0463 HOSPITAL OUTPT CLINIC VISIT: HCPCS | Mod: 25

## 2018-07-06 PROCEDURE — 93005 ELECTROCARDIOGRAM TRACING: CPT | Performed by: FAMILY MEDICINE

## 2018-07-06 RX ORDER — AMLODIPINE BESYLATE 10 MG/1
10 TABLET ORAL DAILY
Qty: 90 TABLET | Refills: 3 | Status: SHIPPED | OUTPATIENT
Start: 2018-07-06 | End: 2018-11-30

## 2018-07-06 ASSESSMENT — ENCOUNTER SYMPTOMS
PALPITATIONS: 0
FATIGUE: 0
ARTHRALGIAS: 1
POLYPHAGIA: 0
ABDOMINAL PAIN: 1

## 2018-07-06 ASSESSMENT — PAIN SCALES - GENERAL: PAINLEVEL: NO PAIN (0)

## 2018-07-06 ASSESSMENT — ANXIETY QUESTIONNAIRES
1. FEELING NERVOUS, ANXIOUS, OR ON EDGE: NOT AT ALL
6. BECOMING EASILY ANNOYED OR IRRITABLE: NOT AT ALL
IF YOU CHECKED OFF ANY PROBLEMS ON THIS QUESTIONNAIRE, HOW DIFFICULT HAVE THESE PROBLEMS MADE IT FOR YOU TO DO YOUR WORK, TAKE CARE OF THINGS AT HOME, OR GET ALONG WITH OTHER PEOPLE: NOT DIFFICULT AT ALL
GAD7 TOTAL SCORE: 0
3. WORRYING TOO MUCH ABOUT DIFFERENT THINGS: NOT AT ALL
7. FEELING AFRAID AS IF SOMETHING AWFUL MIGHT HAPPEN: NOT AT ALL
2. NOT BEING ABLE TO STOP OR CONTROL WORRYING: NOT AT ALL
5. BEING SO RESTLESS THAT IT IS HARD TO SIT STILL: NOT AT ALL

## 2018-07-06 ASSESSMENT — PATIENT HEALTH QUESTIONNAIRE - PHQ9: 5. POOR APPETITE OR OVEREATING: NOT AT ALL

## 2018-07-06 NOTE — PROGRESS NOTES
SUBJECTIVE:   Jaswant Chong is a 47 year old male who presents to clinic today for the following health issues:    HPI  Worried about heart disease.  His dad just had a CABG.  4 vessel.  He is healing up as expected, tolerated the surgery.  He is getting left upper chest pain at times, he has felt is from the loss of his abdominal muscles.  Not into neck or shoulder.  Has had this for about 2 years now.  Mostly with heavy lifting, such as lifting dog food bags.  [place] he had some as well last winter.  Has had a partial diaphragm removal with his cancer surgery, and he has felt this also played a role.  Symptoms perhaps 2 times a week.  Cannot run due to knee djd and ventral hernia issues.  Has uncontrolled diabetes.  He has stopped checking.  Takes his meds somewhat randomly.      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     Priority: Medium     Recurrent ventral hernia 09/14/2015     Priority: Medium     Controlled substance agreement signed 08/13/2015     Priority: Medium     Major depressive disorder, recurrent episode, moderate (H) 08/13/2015     Priority: Medium     Degenerative joint disease (DJD) of hip 02/06/2015     Priority: Medium     Hernia 12/03/2012      Priority: Medium     Abdominal pain 02/16/2012     Priority: Medium     Cellulitis and abscess of leg, except foot 01/19/2012     Priority: Medium     Cellulitis and abscess of trunk 12/22/2011     Priority: Medium     Seroma complicating a procedure 12/20/2011     Priority: Medium     Diabetes mellitus without complication (H) 12/07/2011     Priority: Medium     Ventral hernia with obstruction 12/06/2011     Priority: Medium     Varicocele 05/03/2011     Priority: Medium     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     EXCISE CYST GENERIC (LOCATION)      sebaceous, scalp     OTHER SURGICAL HISTORY      2007,LHB115,LYMPH NODE DISSECTION     OTHER SURGICAL HISTORY      03/08,,HERNIA REPAIR,Repair of surgical wound hernia, metastatic cancer incidentally noted.     OTHER SURGICAL HISTORY      04/08,10608.9,HX ABDOMEN PERITONEUM OMENTUM SURGERY,removal of left hemidiaphragm and omentum [Other] as well as intraperitoneal chemotherapy, 5 FU and Oxaliplatin for metastatic mucinous adenocarcinoma of the appendix.[[[     OTHER SURGICAL HISTORY      04/09,,HERNIA REPAIR, Ventral hernia repair, recurrent appendiceal carcinoma, resection of 7 centimeter left upper quadrant tumor with splenectomy, Surgeon, Dr. Marks     OTHER SURGICAL HISTORY      11/2001,,HERNIA REPAIR,Reduction of ventral hernia and mesh repair of hernia     OTHER SURGICAL HISTORY      12/2012,,HERNIA REPAIR,Removal mesh with infection 12/12     OTHER SURGICAL HISTORY      4/25/13,522533,OTHER,left thumb, Dr. 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      Patient has never smoked.     Alcohol Use - yes, once or twice a month    Preloaded 04/12/2013     Current Outpatient Prescriptions   Medication Sig Dispense Refill     BD VELIA U/F 32G X 4 MM insulin pen needle INJECTING THREE TIMES DAILY BEFORE MEALS 300 each 3     blood glucose monitoring (COOL BLOOD GLUCOSE TEST STRIPS) test strip Dispense item covered by pt ins. E11.9 NIDDM type II - Test 3 times/day, Reason: Unstable diabetes       Blood Glucose Monitoring Suppl (FIFTY50 GLUCOSE METER 2.0) W/DEVICE KIT 3 times daily       Blood Pressure KIT Blood pressure machine       CONTOUR NEXT TEST test strip TESTING THREE TIMES DAILY 300 strip 3     insulin aspart (NOVOLOG PEN) 100 UNIT/ML injection Inject 5 Units Subcutaneous 3 times daily (before meals)       insulin glargine (LANTUS) 100 UNIT/ML injection Inject 15 Units Subcutaneous At Bedtime       lancets 28G MISC Dispense item covered by pt ins. E11.9 NIDDM type II - Test 3 times/day, Reason: Unstable diabetes       liraglutide (VICTOZA PEN) 18 MG/3ML soln Inject 0.6 mg Subcutaneous daily 9 mL 3     lisinopril (PRINIVIL/ZESTRIL) 40 MG tablet Take 1 tablet (40 mg) by mouth daily 90 tablet 3     metFORMIN (GLUCOPHAGE) 1000 MG tablet Take 1,000 mg by mouth 2 times daily (with meals)       oxyCODONE-acetaminophen (PERCOCET) 5-325 MG per tablet Take 1 tablet by mouth every 4 hours as needed 60 tablet 0     pravastatin (PRAVACHOL) 20 MG tablet Take 1 tablet (20 mg) by mouth At Bedtime 90 tablet 3     traMADol (ULTRAM) 50 MG tablet Take 1 tablet (50 mg) by mouth 4 times daily 90 tablet 3     tramadol (ULTRAM) 50 MG tablet Take 50 mg by mouth every 6 hours as needed        No Known Allergies    Review of Systems   Constitutional: Negative for fatigue.   Cardiovascular: Positive for chest pain. Negative for palpitations.   Gastrointestinal: Positive  for abdominal pain.   Endocrine: Negative for polyphagia and polyuria.   Musculoskeletal: Positive for arthralgias.        OBJECTIVE:     BP (!) 154/98 (BP Location: Right arm, Patient Position: Sitting, Cuff Size: Adult Large)  Resp 16  There is no height or weight on file to calculate BMI.  Physical Exam   Constitutional: He is oriented to person, place, and time. He appears well-developed. No distress.   Cardiovascular: Normal rate, regular rhythm and normal heart sounds.  Exam reveals no gallop and no friction rub.    No murmur heard.  Pulmonary/Chest: Effort normal. No respiratory distress. He has no wheezes. He has no rales.   Musculoskeletal: He exhibits no edema.   Neurological: He is alert and oriented to person, place, and time.   Skin: He is not diaphoretic.   Psychiatric: He has a normal mood and affect. His behavior is normal. Thought content normal.       Diagnostic Test Results:  EKG - normal sinus rhythm, 1st degree block, otherwise normal.  No ST changes.    The 10-year ASCVD risk score (Lafayette MARIETTA Jr, et al., 2013) is: 16.8%    Values used to calculate the score:      Age: 47 years      Sex: Male      Is Non- : No      Diabetic: Yes      Tobacco smoker: No      Systolic Blood Pressure: 154 mmHg      Is BP treated: Yes      HDL Cholesterol: 31 mg/dL      Total Cholesterol: 235 mg/dL    ASSESSMENT/PLAN:         (R07.89) Other chest pain  (primary encounter diagnosis)  Comment: new dx  Plan: EKG 12-lead complete w/read - Clinics, [place]         Lexiscan stress test        He has significant risk factors for cad, including the diabetes, hypertension and now a significant fh.  I struggle to help him with medical compliance and to pay attention to his diabetes.  A1c was elevated recently, had been off all meds. It is possible the chest pain is musculoskeletal as well.     (I10) HTN (hypertension), malignant  Comment: ongoing problem  Plan: amLODIPine (NORVASC) 10 MG tablet        Really  should also start a betablocker, but given pending stress test, so norvasc added on and at follow up will then layer more meds.        Mik Kumari MD  [facility]

## 2018-07-06 NOTE — MR AVS SNAPSHOT
After Visit Summary   7/6/2018    aJswant Chong    MRN: [id number]           Patient Information     Date Of Birth          1970        Visit Information        Provider Department      7/6/2018 2:00 PM Mik Kumari MD         Today's Diagnoses     Other chest pain    -  1    HTN (hypertension), malignant           Follow-ups after your visit        Follow-up notes from your care team     Return in about 4 weeks (around 8/3/2018).      Future tests that were ordered for you today     Open Future Orders        Priority Expected Expires Ordered    NM Lexiscan stress test Routine  7/6/2019 7/6/2018            Who to contact     If you have questions or need follow up information about today's clinic visit or your schedule please contact [facility] directly at [phone].  Normal or non-critical lab and imaging results will be communicated to you by [employer], letter or phone within 4 business days after the clinic has received the results. If you do not hear from us within 7 days, please contact the clinic through [employer] or phone. If you have a critical or abnormal lab result, we will notify you by phone as soon as possible.  Submit refill requests through [employer] or call your pharmacy and they will forward the refill request to us. Please allow 3 business days for your refill to be completed.          Additional Information About Your Visit        MyChart Information     [employer] gives you secure access to your electronic health record. If you see a primary care provider, you can also send messages to your care team and make appointments. If you have questions, please call your primary care clinic.  If you do not have a primary care provider, please call [phone] and they will assist you.        Care EveryWhere ID     This is your Care EveryWhere ID. This could be used by other organizations to access your [facility]  records  [id number]        Your Vitals Were     Respirations                   16            Blood Pressure from Last 3 Encounters:   07/06/18 (!) 154/98   05/30/18 126/68   11/16/17 128/82    Weight from Last 3 Encounters:   05/30/18 306 lb 6.4 oz (139 kg)   11/16/17 (!) 320 lb 4 oz (145.3 kg)   05/05/17 (!) 325 lb (147.4 kg)              We Performed the Following     EKG 12-lead complete w/read - Clinics          Today's Medication Changes          These changes are accurate as of 7/6/18  2:48 PM.  If you have any questions, ask your nurse or doctor.               Start taking these medicines.        Dose/Directions    amLODIPine 10 MG tablet   Commonly known as:  NORVASC   Used for:  HTN (hypertension), malignant   Started by:  Mik Kumari MD        Dose:  10 mg   Take 1 tablet (10 mg) by mouth daily   Quantity:  90 tablet   Refills:  3            Where to get your medicines      These medications were sent to [id number] - [place], [place] - [address]  [address], [facility] [address]     Phone:  [phone]     amLODIPine 10 MG tablet                Primary Care Provider Office Phone # Fax #    Mik Kumari [phone]       [address] [facility] [address]        Equal Access to Services     IDA KEY AH: Hadii jones alvarado hadrello Sodaisy, waaxda luqadaha, qaybta kaalmasummer rodney, yvonne jiménez . So [facility] [phone].    ATENCIÓN: Si habla español, tiene a aden disposición servicios gratuitos de asistencia lingüística. Vincent al [phone].    We comply with applicable federal civil rights laws and [place] laws. We do not discriminate on the basis of race, color, national origin, age, disability, sex, sexual orientation, or gender identity.            Thank you!     Thank you for choosing [facility]  for your care. Our goal is always to provide you with excellent care.  Hearing back from our patients is one way we can continue to improve our services. Please take a few minutes to complete the written survey that you may receive in the mail after your visit with us. Thank you!             Your Updated Medication List - Protect others around you: Learn how to safely use, store and throw away your medicines at www.disposemymeds.org.          This list is accurate as of 7/6/18  2:48 PM.  Always use your most recent med list.                   Brand Name Dispense Instructions for use Diagnosis    amLODIPine 10 MG tablet    NORVASC    90 tablet    Take 1 tablet (10 mg) by mouth daily    HTN (hypertension), malignant       BD VELIA U/F 32G X 4 MM   Generic drug:  insulin pen needle     300 each    INJECTING THREE TIMES DAILY BEFORE MEALS    Uncontrolled type 2 diabetes mellitus without complication, without long-term current use of insulin (H)       Blood Pressure Kit      Blood pressure machine        * COOL BLOOD GLUCOSE TEST STRIPS test strip   Generic drug:  blood glucose monitoring      Dispense item covered by pt ins. E11.9 NIDDM type II - Test 3 times/day, Reason: Unstable diabetes        * CONTOUR NEXT TEST test strip   Generic drug:  blood glucose monitoring     300 strip    TESTING THREE TIMES DAILY    Uncontrolled type 2 diabetes mellitus without complication, without long-term current use of insulin (H)       FIFTY50 GLUCOSE METER 2.0 w/Device Kit      3 times daily        insulin aspart 100 UNIT/ML injection    NovoLOG PEN     Inject 5 Units Subcutaneous 3 times daily (before meals)        insulin glargine 100 UNIT/ML injection    LANTUS     Inject 15 Units Subcutaneous At Bedtime        lancets 28G Misc      Dispense item covered by pt ins. E11.9 NIDDM type II - Test 3 times/day, Reason: Unstable diabetes        liraglutide 18 MG/3ML soln    VICTOZA PEN    9 mL    Inject 0.6 mg Subcutaneous daily    Uncontrolled type 2 diabetes mellitus without complication, without long-term  current use of insulin (H)       lisinopril 40 MG tablet    PRINIVIL/ZESTRIL    90 tablet    Take 1 tablet (40 mg) by mouth daily    HTN (hypertension), malignant       metFORMIN 1000 MG tablet    GLUCOPHAGE     Take 1,000 mg by mouth 2 times daily (with meals)        oxyCODONE-acetaminophen 5-325 MG per tablet    PERCOCET    60 tablet    Take 1 tablet by mouth every 4 hours as needed    Malignant neoplasm of appendix vermiformis (H)       pravastatin 20 MG tablet    PRAVACHOL    90 tablet    Take 1 tablet (20 mg) by mouth At Bedtime    Uncontrolled type 2 diabetes mellitus without complication, without long-term current use of insulin (H)       * traMADol 50 MG tablet    ULTRAM     Take 50 mg by mouth every 6 hours as needed        * traMADol 50 MG tablet    ULTRAM    90 tablet    Take 1 tablet (50 mg) by mouth 4 times daily    Malignant neoplasm of appendix vermiformis (H)       * Notice:  This list has 4 medication(s) that are the same as other medications prescribed for you. Read the directions carefully, and ask your doctor or other care provider to review them with you.

## 2018-07-07 ASSESSMENT — ANXIETY QUESTIONNAIRES: GAD7 TOTAL SCORE: 0

## 2018-07-18 ENCOUNTER — TELEPHONE (OUTPATIENT)
Dept: CARDIOLOGY | Facility: OTHER | Age: 48
End: 2018-07-18

## 2018-07-18 ENCOUNTER — MYC MEDICAL ADVICE (OUTPATIENT)
Dept: FAMILY MEDICINE | Facility: OTHER | Age: 48
End: 2018-07-18

## 2018-07-19 ENCOUNTER — HOSPITAL ENCOUNTER (OUTPATIENT)
Dept: NUCLEAR MEDICINE | Facility: OTHER | Age: 48
Discharge: HOME | End: 2018-07-19
Attending: FAMILY MEDICINE | Admitting: FAMILY MEDICINE
Payer: COMMERCIAL

## 2018-07-19 ENCOUNTER — HOSPITAL ENCOUNTER (OUTPATIENT)
Dept: NUCLEAR MEDICINE | Facility: OTHER | Age: 48
End: 2018-07-19
Attending: FAMILY MEDICINE
Payer: COMMERCIAL

## 2018-07-19 ENCOUNTER — OFFICE VISIT (OUTPATIENT)
Dept: FAMILY MEDICINE | Facility: OTHER | Age: 48
End: 2018-07-19
Attending: FAMILY MEDICINE
Payer: COMMERCIAL

## 2018-07-19 ENCOUNTER — TELEPHONE (OUTPATIENT)
Dept: FAMILY MEDICINE | Facility: OTHER | Age: 48
End: 2018-07-19

## 2018-07-19 ENCOUNTER — MYC MEDICAL ADVICE (OUTPATIENT)
Dept: FAMILY MEDICINE | Facility: OTHER | Age: 48
End: 2018-07-19

## 2018-07-19 VITALS — RESPIRATION RATE: 16 BRPM | HEART RATE: 66 BPM | DIASTOLIC BLOOD PRESSURE: 70 MMHG | SYSTOLIC BLOOD PRESSURE: 128 MMHG

## 2018-07-19 VITALS — WEIGHT: 306 LBS | BODY MASS INDEX: 38.05 KG/M2 | HEIGHT: 75 IN

## 2018-07-19 DIAGNOSIS — E11.9 DIABETES MELLITUS WITHOUT COMPLICATION (H): Primary | ICD-10-CM

## 2018-07-19 DIAGNOSIS — R07.89 OTHER CHEST PAIN: ICD-10-CM

## 2018-07-19 DIAGNOSIS — R94.39 POSITIVE CARDIAC STRESS TEST: ICD-10-CM

## 2018-07-19 DIAGNOSIS — C18.1 MALIGNANT NEOPLASM OF APPENDIX VERMIFORMIS (H): Primary | ICD-10-CM

## 2018-07-19 PROBLEM — N41.1 PROSTATITIS, CHRONIC: Status: RESOLVED | Noted: 2018-02-27 | Resolved: 2018-07-19

## 2018-07-19 PROCEDURE — 25000128 H RX IP 250 OP 636: Performed by: INTERNAL MEDICINE

## 2018-07-19 PROCEDURE — A9500 TC99M SESTAMIBI: HCPCS | Performed by: FAMILY MEDICINE

## 2018-07-19 PROCEDURE — 78452 HT MUSCLE IMAGE SPECT MULT: CPT

## 2018-07-19 PROCEDURE — 93018 CV STRESS TEST I&R ONLY: CPT | Performed by: INTERNAL MEDICINE

## 2018-07-19 PROCEDURE — G0463 HOSPITAL OUTPT CLINIC VISIT: HCPCS | Mod: 25

## 2018-07-19 PROCEDURE — G0463 HOSPITAL OUTPT CLINIC VISIT: HCPCS

## 2018-07-19 PROCEDURE — 93017 CV STRESS TEST TRACING ONLY: CPT

## 2018-07-19 PROCEDURE — 34300033 ZZH RX 343: Performed by: FAMILY MEDICINE

## 2018-07-19 PROCEDURE — 93016 CV STRESS TEST SUPVJ ONLY: CPT | Performed by: INTERNAL MEDICINE

## 2018-07-19 PROCEDURE — 99213 OFFICE O/P EST LOW 20 MIN: CPT | Performed by: FAMILY MEDICINE

## 2018-07-19 RX ORDER — AMINOPHYLLINE 25 MG/ML
50 INJECTION, SOLUTION INTRAVENOUS
Status: DISCONTINUED | OUTPATIENT
Start: 2018-07-19 | End: 2018-07-20 | Stop reason: HOSPADM

## 2018-07-19 RX ORDER — INSULIN GLARGINE 100 [IU]/ML
20 INJECTION, SOLUTION SUBCUTANEOUS DAILY
Qty: 30 ML | Refills: 3 | Status: SHIPPED | OUTPATIENT
Start: 2018-07-19 | End: 2019-03-04

## 2018-07-19 RX ORDER — OXYCODONE AND ACETAMINOPHEN 5; 325 MG/1; MG/1
1 TABLET ORAL EVERY 4 HOURS PRN
Qty: 90 TABLET | Refills: 0 | Status: SHIPPED | OUTPATIENT
Start: 2018-07-19 | End: 2018-11-30

## 2018-07-19 RX ORDER — REGADENOSON 0.08 MG/ML
0.4 INJECTION, SOLUTION INTRAVENOUS ONCE
Status: COMPLETED | OUTPATIENT
Start: 2018-07-19 | End: 2018-07-19

## 2018-07-19 RX ADMIN — KIT FOR THE PREPARATION OF TECHNETIUM TC99M SESTAMIBI 43.4 MCI.: 1 INJECTION, POWDER, LYOPHILIZED, FOR SOLUTION PARENTERAL at 10:00

## 2018-07-19 RX ADMIN — REGADENOSON 0.4 MG: 0.08 INJECTION, SOLUTION INTRAVENOUS at 09:58

## 2018-07-19 RX ADMIN — KIT FOR THE PREPARATION OF TECHNETIUM TC99M SESTAMIBI 9.79 MCI.: 1 INJECTION, POWDER, LYOPHILIZED, FOR SOLUTION PARENTERAL at 08:15

## 2018-07-19 ASSESSMENT — ENCOUNTER SYMPTOMS
FATIGUE: 0
ARTHRALGIAS: 1
ABDOMINAL PAIN: 1
FEVER: 0

## 2018-07-19 ASSESSMENT — PAIN SCALES - GENERAL: PAINLEVEL: SEVERE PAIN (6)

## 2018-07-19 NOTE — PROGRESS NOTES
SUBJECTIVE:   Jaswant Chong is a 47 year old male who presents to clinic today for the following health issues:    HPI  Follow up pain meds.  Had a negative tox screen in May.  Uses 1-2 ultram daily and 2-3 percocet a week.  All based on his activity.  Pains are in abdomen mostly, but some in the knee.  This is a little better with weight loss.  Just completed his stress test this morning.    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     Priority: Medium     Recurrent ventral hernia 09/14/2015     Priority: Medium     Controlled substance agreement signed 08/13/2015     Priority: Medium     Major depressive disorder, recurrent episode, moderate (H) 08/13/2015     Priority: Medium     Degenerative joint disease (DJD) of hip 02/06/2015     Priority: Medium     Hernia 12/03/2012     Priority: Medium     Abdominal pain 02/16/2012     Priority: Medium     Diabetes mellitus without complication (H) 12/07/2011     Priority: Medium     Ventral hernia with obstruction 12/06/2011     Priority: Medium     Varicocele 05/03/2011     Priority: Medium     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     EXCISE CYST GENERIC (LOCATION)      sebaceous, scalp     OTHER SURGICAL HISTORY      2007,LEW330,LYMPH NODE DISSECTION     OTHER SURGICAL HISTORY      03/08,,HERNIA REPAIR,Repair of surgical wound hernia, metastatic cancer incidentally noted.     OTHER SURGICAL HISTORY      04/08,96895.9,HX ABDOMEN PERITONEUM OMENTUM SURGERY,removal of left hemidiaphragm and omentum [Other] as well as intraperitoneal chemotherapy, 5 FU and Oxaliplatin for metastatic mucinous adenocarcinoma of the appendix.[[[     OTHER SURGICAL HISTORY      04/09,,HERNIA REPAIR, Ventral hernia repair, recurrent appendiceal carcinoma, resection of 7 centimeter left upper quadrant tumor with splenectomy, Surgeon, Dr. Marks     OTHER SURGICAL HISTORY      11/2001,,HERNIA REPAIR,Reduction of ventral hernia and mesh repair of hernia     OTHER SURGICAL HISTORY      12/2012,,HERNIA REPAIR,Removal mesh with infection 12/12     OTHER SURGICAL HISTORY      4/25/13,498254,OTHER,left thumb, Dr. Donald     Social History   Substance Use Topics     Smoking status: Never Smoker     Smokeless tobacco: Never Used     Alcohol use 0.0 oz/week      Comment: Alcoholic Drinks/day: social     Current Outpatient Prescriptions   Medication Sig Dispense Refill     oxyCODONE-acetaminophen (PERCOCET) 5-325 MG per tablet Take 1 tablet by mouth every 4 hours as needed 90 tablet 0     amLODIPine (NORVASC) 10 MG tablet Take 1 tablet (10 mg) by mouth daily 90 tablet 3     BD VELIA U/F 32G X 4 MM insulin pen needle INJECTING THREE TIMES DAILY BEFORE MEALS 300 each 3     blood glucose monitoring (COOL BLOOD GLUCOSE TEST STRIPS) test strip Dispense item covered by pt ins. E11.9 NIDDM type II - Test 3 times/day, Reason: Unstable diabetes       Blood Glucose Monitoring Suppl (FIFTY50 GLUCOSE METER 2.0) W/DEVICE KIT 3 times daily        Blood Pressure KIT Blood pressure machine       CONTOUR NEXT TEST test strip TESTING THREE TIMES DAILY 300 strip 3     insulin aspart (NOVOLOG PEN) 100 UNIT/ML injection Inject 5 Units Subcutaneous 3 times daily (before meals)       insulin glargine (LANTUS SOLOSTAR) 100 UNIT/ML pen Inject 20 Units Subcutaneous At Bedtime 30 mL 3     insulin glargine (LANTUS) 100 UNIT/ML injection Inject 15 Units Subcutaneous At Bedtime       insulin pen needle (NOVOFINE) 32G X 6 MM Use 1 daily or as directed. 100 each prn     lancets 28G MISC Dispense item covered by pt ins. E11.9 NIDDM type II - Test 3 times/day, Reason: Unstable diabetes       liraglutide (VICTOZA PEN) 18 MG/3ML soln Inject 0.6 mg Subcutaneous daily 9 mL 3     lisinopril (PRINIVIL/ZESTRIL) 40 MG tablet Take 1 tablet (40 mg) by mouth daily 90 tablet 3     metFORMIN (GLUCOPHAGE) 1000 MG tablet Take 1,000 mg by mouth 2 times daily (with meals)       pravastatin (PRAVACHOL) 20 MG tablet Take 1 tablet (20 mg) by mouth At Bedtime 90 tablet 3     traMADol (ULTRAM) 50 MG tablet Take 1 tablet (50 mg) by mouth 4 times daily 90 tablet 3     tramadol (ULTRAM) 50 MG tablet Take 50 mg by mouth every 6 hours as needed        No Known Allergies    Review of Systems   Constitutional: Negative for fatigue and fever.   Gastrointestinal: Positive for abdominal pain.   Musculoskeletal: Positive for arthralgias.        OBJECTIVE:     /70 (BP Location: Right arm, Patient Position: Sitting, Cuff Size: Adult Regular)  Pulse 66  Resp 16  There is no height or weight on file to calculate BMI.  Physical Exam   Constitutional: He is oriented to person, place, and time. He appears well-developed and well-nourished. No distress.   Abdominal:   Significant distension, loss off abdomen musculature.   Neurological: He is alert and oriented to person, place, and time.   Skin: He is not diaphoretic.   Psychiatric: He has a normal mood and affect. His behavior is normal. Thought content  normal.       Diagnostic Test Results:  none     ASSESSMENT/PLAN:         (C18.1) Malignant neoplasm of appendix vermiformis (H)  (primary encounter diagnosis)  Comment: stable  Plan: oxyCODONE-acetaminophen (PERCOCET) 5-325 MG per        tablet        #90 given.  Since this is treating complications of cancer, the clinic narcotic policy is not applicable.        Mik Kumari MD  [facility]    After he left, the stress test results come back and it is mildly positive:    Results for orders placed or performed during the hospital encounter of 07/19/18   NM Lexiscan stress test    Narrative    NM MPI WITH LEXISCAN    HISTORY:47 years Male,; Other chest pain    COMPARISON: None.    TECHNIQUE: Gated SPECT with wall motion and ejection fraction  calculation. Stress was performed via Lexiscan pharmacologic  technique. Please see the accompanying internal medicine report for  additional details.  DOSE (Stress/Rest): 43.40 mCi/9.79 mCi Tc-99m Sestamibi ?    FINDINGS: There is good uptake of activity by the left ventricle. The  left ventricular size is dilated, with an EDV of 218 ml.    There are no areas of reversible myocardial perfusion deficit to  suggest ischemia. Small persistent abnormality laterally and  anteriorly.    Mild hypokinesis of the cardiac apex. The ejection fraction is mildly  decreased, at 55%.        Impression    IMPRESSION:   1.  Left ventricular dilatation with an end-diastolic volume of 218  mL.    2.  Mild decrease in ejection fraction of 55%. Focal hypokinesia at  the cardiac apex.    3.  Questionable focal fixed perfusion abnormality anteriorly and  laterally in the left circumflex distribution.    MARÍA ELENA GAMINO MD     I will arrange a consult with cardiology.    Mik Kumari MD on 7/19/2018 at 1:56 PM

## 2018-07-19 NOTE — PROGRESS NOTES
0800The patient arrived for a Lexiscan Cardiolite stress test.  The procedure, risks, and benefits were discussed with the patient ,and the consent was signed.  A saline lock was started,and the Cardiolite was injected by x-ray.  The patient was taken to the waiting area, to await resting images at 0825*.  0935 The patient returned from x-ray and was prepped for the stress test.   Dr. Balbuena arrived, and the patient was administered the Lexiscan per procedure.  The patient tolerated the procedure well.  He was given a snack and taken to x-ray in stable condition for stress images.  The saline lock will be removed by x-ray for proper disposal.  Please see the chart for the complete test results.

## 2018-07-19 NOTE — MR AVS SNAPSHOT
After Visit Summary   7/19/2018    Jaswant Chong    MRN: [id number]           Patient Information     Date Of Birth          1970        Visit Information        Provider Department      7/19/2018 11:15 AM Mik Kumari MD [facility]        Today's Diagnoses     Malignant neoplasm of appendix vermiformis (H)    -  1    Positive cardiac stress test           Follow-ups after your visit        Additional Services     CARDIOLOGY EVAL ADULT REFERRAL       Preferred location:  [place]: [facility]  [phone] [email or web address]    Please be aware that coverage of these services is subject to the terms and limitations of your health insurance plan.  Call member services at your health plan with any benefit or coverage questions.      Please bring the following to your appointment:  Any x-rays, CTs or MRIs which have been performed. Contact the facility where they were done to arrange for  prior to your scheduled appointment.    List of current medications  This referral request   Any documents/labs given to you for this referral                  Follow-up notes from your care team     Return in about 3 months (around 10/19/2018).      Future tests that were ordered for you today     Open Future Orders        Priority Expected Expires Ordered    Stress EKG Interpretation Routine  7/19/2019 7/19/2018            Who to contact     If you have questions or need follow up information about today's clinic visit or your schedule please contact [facility] directly at [phone].  Normal or non-critical lab and imaging results will be communicated to you by MyChart, letter or phone within 4 business days after the clinic has received the results. If you do not hear from us within 7 days, please contact the clinic through MyChart or phone. If you have a critical or abnormal lab result, we will notify you by phone  as soon as possible.  Submit refill requests through [employer] or call your pharmacy and they will forward the refill request to us. Please allow 3 business days for your refill to be completed.          Additional Information About Your Visit        [employer] Information     [employer] gives you secure access to your electronic health record. If you see a primary care provider, you can also send messages to your care team and make appointments. If you have questions, please call your primary care clinic.  If you do not have a primary care provider, please call [phone] and they will assist you.        Care EveryWhere ID     This is your Care EveryWhere ID. This could be used by other organizations to access your [facility] medical records  [id number]        Your Vitals Were     Pulse Respirations                66 16           Blood Pressure from Last 3 Encounters:   07/19/18 128/70   07/06/18 (!) 154/98   05/30/18 126/68    Weight from Last 3 Encounters:   07/19/18 306 lb (138.8 kg)   05/30/18 306 lb 6.4 oz (139 kg)   11/16/17 (!) 320 lb 4 oz (145.3 kg)              We Performed the Following     CARDIOLOGY EVAL ADULT REFERRAL          Where to get your medicines      Some of these will need a paper prescription and others can be bought over the counter.  Ask your nurse if you have questions.     Bring a paper prescription for each of these medications     oxyCODONE-acetaminophen 5-325 MG per tablet         Information about OPIOIDS     PRESCRIPTION OPIOIDS: WHAT YOU NEED TO KNOW   We gave you an opioid (narcotic) pain medicine. It is important to manage your pain, but opioids are not always the best choice. You should first try all the other options your care team gave you. Take this medicine for as short a time (and as few doses) as possible.     These medicines have risks:    DO NOT drive when on new or higher doses of pain medicine. These medicines can affect your alertness and reaction times, and you could be  arrested for driving under the influence (DUI). If you need to use opioids long-term, talk to your care team about driving.    DO NOT operate heave machinery    DO NOT do any other dangerous activities while taking these medicines.     DO NOT drink any alcohol while taking these medicines.      If the opioid prescribed includes acetaminophen, DO NOT take with any other medicines that contain acetaminophen. Read all labels carefully. Look for the word  acetaminophen  or  Tylenol.  Ask your pharmacist if you have questions or are unsure.    You can get addicted to pain medicines, especially if you have a history of addiction (chemical, alcohol or substance dependence). Talk to your care team about ways to reduce this risk.    Store your pills in a secure place, locked if possible. We will not replace any lost or stolen medicine. If you don t finish your medicine, please throw away (dispose) as directed by your pharmacist. The [place] [employer]ution Control Agency has more information about safe disposal: https://[email or web address].[facility].[place].us/living-green/managing-unwanted-medications.     All opioids tend to cause constipation. Drink plenty of water and eat foods that have a lot of fiber, such as fruits, vegetables, prune juice, apple juice and high-fiber cereal. Take a laxative (Miralax, milk of magnesia, Colace, Senna) if you don t move your bowels at least every other day.          Primary Care Provider Office Phone # Fax #    Mik Kumari [phone]       [address] [facility] [address]        Equal Access to Services     [facility] : Hadii jones Nash, waaxda luivet, qaybta kaalmada rashaun, yvonne roblero. So [facility] [phone].    ATENCIÓN: Si habla español, tiene a aden disposición servicios gratuitos de asistencia lingüística. Llame al [phone].    We comply with applicable federal civil rights laws and [place] laws. We do not discriminate on  the basis of race, color, national origin, age, disability, sex, sexual orientation, or gender identity.            Thank you!     Thank you for choosing [facility] AND [facility]  for your care. Our goal is always to provide you with excellent care. Hearing back from our patients is one way we can continue to improve our services. Please take a few minutes to complete the written survey that you may receive in the mail after your visit with us. Thank you!             Your Updated Medication List - Protect others around you: Learn how to safely use, store and throw away your medicines at www.disposemymeds.org.          This list is accurate as of 7/19/18  2:00 PM.  Always use your most recent med list.                   Brand Name Dispense Instructions for use Diagnosis    amLODIPine 10 MG tablet    NORVASC    90 tablet    Take 1 tablet (10 mg) by mouth daily    HTN (hypertension), malignant       * BD VELIA U/F 32G X 4 MM   Generic drug:  insulin pen needle     300 each    INJECTING THREE TIMES DAILY BEFORE MEALS    Uncontrolled type 2 diabetes mellitus without complication, without long-term current use of insulin (H)       * insulin pen needle 32G X 6 MM    NOVOFINE    100 each    Use 1 daily or as directed.    Uncontrolled type 2 diabetes mellitus without complication, with long-term current use of insulin (H)       Blood Pressure Kit      Blood pressure machine        * COOL BLOOD GLUCOSE TEST STRIPS test strip   Generic drug:  blood glucose monitoring      Dispense item covered by pt ins. E11.9 NIDDM type II - Test 3 times/day, Reason: Unstable diabetes        * CONTOUR NEXT TEST test strip   Generic drug:  blood glucose monitoring     300 strip    TESTING THREE TIMES DAILY    Uncontrolled type 2 diabetes mellitus without complication, without long-term current use of insulin (H)       FIFTY50 GLUCOSE METER 2.0 w/Device Kit      3 times daily        insulin aspart 100 UNIT/ML injection    NovoLOG PEN      Inject 5 Units Subcutaneous 3 times daily (before meals)        * insulin glargine 100 UNIT/ML injection    LANTUS     Inject 15 Units Subcutaneous At Bedtime        * insulin glargine 100 UNIT/ML injection    LANTUS SOLOSTAR    30 mL    Inject 20 Units Subcutaneous At Bedtime    Uncontrolled type 2 diabetes mellitus without complication, with long-term current use of insulin (H)       lancets 28G Misc      Dispense item covered by pt ins. E11.9 NIDDM type II - Test 3 times/day, Reason: Unstable diabetes        liraglutide 18 MG/3ML soln    VICTOZA PEN    9 mL    Inject 0.6 mg Subcutaneous daily    Uncontrolled type 2 diabetes mellitus without complication, without long-term current use of insulin (H)       lisinopril 40 MG tablet    PRINIVIL/ZESTRIL    90 tablet    Take 1 tablet (40 mg) by mouth daily    HTN (hypertension), malignant       metFORMIN 1000 MG tablet    GLUCOPHAGE     Take 1,000 mg by mouth 2 times daily (with meals)        oxyCODONE-acetaminophen 5-325 MG per tablet    PERCOCET    90 tablet    Take 1 tablet by mouth every 4 hours as needed    Malignant neoplasm of appendix vermiformis (H)       pravastatin 20 MG tablet    PRAVACHOL    90 tablet    Take 1 tablet (20 mg) by mouth At Bedtime    Uncontrolled type 2 diabetes mellitus without complication, without long-term current use of insulin (H)       * traMADol 50 MG tablet    ULTRAM     Take 50 mg by mouth every 6 hours as needed        * traMADol 50 MG tablet    ULTRAM    90 tablet    Take 1 tablet (50 mg) by mouth 4 times daily    Malignant neoplasm of appendix vermiformis (H)       * Notice:  This list has 8 medication(s) that are the same as other medications prescribed for you. Read the directions carefully, and ask your doctor or other care provider to review them with you.

## 2018-07-19 NOTE — TELEPHONE ENCOUNTER
lantus solostar is not covered, Basaglar is cover. Could they get an Rx faxed in for this  Jailyn Baker LPN on 7/19/2018 at 1:29 PM

## 2018-07-19 NOTE — TELEPHONE ENCOUNTER
He has to follow up with me in person for this.  I can work him in if needed.  Mik Kumari MD on 7/19/2018 at 11:07 AM

## 2018-07-19 NOTE — TELEPHONE ENCOUNTER
Enzo is here, in his [place] message  He is asking for a refill of hie Percocet medication.  Jailyn Baker LPN on 7/19/2018 at 11:05 AM

## 2018-07-23 ENCOUNTER — OFFICE VISIT (OUTPATIENT)
Dept: CARDIOLOGY | Facility: OTHER | Age: 48
End: 2018-07-23
Attending: FAMILY MEDICINE
Payer: COMMERCIAL

## 2018-07-23 VITALS
SYSTOLIC BLOOD PRESSURE: 142 MMHG | HEIGHT: 75 IN | BODY MASS INDEX: 38.17 KG/M2 | WEIGHT: 307 LBS | DIASTOLIC BLOOD PRESSURE: 80 MMHG

## 2018-07-23 DIAGNOSIS — I10 HTN (HYPERTENSION), MALIGNANT: ICD-10-CM

## 2018-07-23 DIAGNOSIS — R06.09 DOE (DYSPNEA ON EXERTION): ICD-10-CM

## 2018-07-23 DIAGNOSIS — E78.2 MIXED HYPERLIPIDEMIA: ICD-10-CM

## 2018-07-23 DIAGNOSIS — Z82.49 FAMILY HISTORY OF PREMATURE CAD: ICD-10-CM

## 2018-07-23 DIAGNOSIS — R94.39 ABNORMAL CARDIOVASCULAR STRESS TEST: Primary | ICD-10-CM

## 2018-07-23 DIAGNOSIS — R07.9 EXERTIONAL CHEST PAIN: ICD-10-CM

## 2018-07-23 PROCEDURE — G0463 HOSPITAL OUTPT CLINIC VISIT: HCPCS | Mod: 25

## 2018-07-23 PROCEDURE — 99205 OFFICE O/P NEW HI 60 MIN: CPT | Performed by: NURSE PRACTITIONER

## 2018-07-23 PROCEDURE — 93005 ELECTROCARDIOGRAM TRACING: CPT | Performed by: NURSE PRACTITIONER

## 2018-07-23 PROCEDURE — 93010 ELECTROCARDIOGRAM REPORT: CPT | Performed by: INTERNAL MEDICINE

## 2018-07-23 ASSESSMENT — PAIN SCALES - GENERAL: PAINLEVEL: SEVERE PAIN (6)

## 2018-07-23 NOTE — MR AVS SNAPSHOT
After Visit Summary   7/23/2018    Jaswant Chong    MRN: [id number]           Patient Information     Date Of Birth          1970        Visit Information        Provider Department      7/23/2018 2:45 PM Kira Salvador APRN CNP [facility]        Today's Diagnoses     Abnormal cardiovascular stress test    -  1    Exertional chest pain        GLORIA (dyspnea on exertion)        Mixed hyperlipidemia        Uncontrolled type 2 diabetes mellitus without complication, with long-term current use of insulin (H)        HTN (hypertension), malignant        Family history of premature CAD           Follow-ups after your visit        Additional Services     DIABETES EDUCATOR REFERRAL       DIABETES SELF MANAGEMENT TRAINING (DSMT)      Your provider has referred you to Diabetes Education: [place]: [facility] [phone]  [email or web address]    A  will call you to make your appointment. If it has been more than 3 business days since your referral was placed, please call the above phone number to schedule.    Type of training and number of hours: Previous Diagnosis: Follow-up DSMT - 2 hours.    Diabetes Type: Type 2 - On Insulin   Medicare covers: 10 hours of initial DSMT in 12 month period from the time of first visit, plus 2 hours of follow-up DSMT annually, and additional hours as requested for insulin training.         Diabetes Co-Morbidities: dyslipidemia, hypertension and obesity               A1C Goal:  <7.0       A1C is: Lab Results       Component                Value               Date                       A1C                      13.6                05/30/2018              MEDICAL NUTRITION THERAPY (MNT) for Diabetes    Medical Nutrition Therapy with a Registered Dietitian can be provided in coordination with Diabetes Self-Management Training to assist in achieving optimal diabetes management.    MNT Type and Hours:  Previous diagnosis: Annual follow-up MNT - 2 hours                       Medicare will cover: 3 hours initial MNT in 12 month period after first visit, plus 2 hours of follow-up MNT annually        Diabetes Education Topics: Comprehensive Knowledge Assessment and Instruction and Diagnostic Continuous Glucose Monitoring     Special Educational Needs Requiring Individual DSMT: Additional Insulin Training      Please be aware that coverage of these services is subject to the terms and limitations of your health insurance plan.  Call member services at your health plan to determine Diabetes Self-Management Training (Codes  and ) and Medical Nutrition Therapy (Codes 25099 and 54606) benefits and ask which blood glucose monitor brands are covered by your plan.  Please bring the following with you to your appointment:    (1)  List of current medications   (2)  List of Blood Glucose Monitor brands that are covered by your insurance plan  (3)  Blood Glucose Monitor and log book  (4)   Food records for the 3 days prior to your visit    The Certified Diabetes Educator may make diabetes medication adjustments per the CDE Protocol and Collaborative Practice Agreement.                  Follow-up notes from your care team     Return in about 2 weeks (around 8/6/2018).      Your next 10 appointments already scheduled     Jul 26, 2018 12:30 PM CDT   LAB with UU LAB GOLD WAITING   [facility] ([facility])    [address] [address]              Please do not eat 10-12 hours before your appointment if you are coming in fasting for labs on lipids, cholesterol, or glucose (sugar). This does not apply to pregnant women. Water, hot tea and black coffee (with nothing added) are okay. Do not drink other fluids, diet soda or chew gum.            Jul 26, 2018  1:00 PM CDT   Procedure 1.5 hr with U2A ROOM 7   [address] [facility] ([facility]  "[facility])    [address] [facility] [address]               Jul 26, 2018  2:30 PM CDT   Cath 90 Minute with UUHCVR3   [facility] [place],  Heart Cath Lab ([facility])    [address] [facility] [address]   [phone]              Future tests that were ordered for you today     Open Future Orders        Priority Expected Expires Ordered    Coronary Angiography Adult Order Routine 7/30/2018 7/23/2019 7/23/2018            Who to contact     If you have questions or need follow up information about today's clinic visit or your schedule please contact [facility] directly at [phone].  Normal or non-critical lab and imaging results will be communicated to you by [employer], letter or phone within 4 business days after the clinic has received the results. If you do not hear from us within 7 days, please contact the clinic through "[employer]t or phone. If you have a critical or abnormal lab result, we will notify you by phone as soon as possible.  Submit refill requests through [employer] or call your pharmacy and they will forward the refill request to us. Please allow 3 business days for your refill to be completed.          Additional Information About Your Visit        [employer] Information     [employer] gives you secure access to your electronic health record. If you see a primary care provider, you can also send messages to your care team and make appointments. If you have questions, please call your primary care clinic.  If you do not have a primary care provider, please call [phone] and they will assist you.        Care EveryWhere ID     This is your Care EveryWhere ID. This could be used by other organizations to access your [facility] medical records  [id number]        Your Vitals Were     Height BMI (Body Mass Index)                1.905 m (6' 3\") 38.37 kg/m2           Blood Pressure from Last 3 Encounters:   07/23/18 " 142/80   07/19/18 128/70   07/06/18 (!) 154/98    Weight from Last 3 Encounters:   07/23/18 139.3 kg (307 lb)   07/19/18 138.8 kg (306 lb)   05/30/18 139 kg (306 lb 6.4 oz)              We Performed the Following     DIABETES EDUCATOR REFERRAL     EKG 12-lead, tracing only (Same Day)        Primary Care Provider Office Phone # Fax #    Mik Kumari [phone] [address] [facility] [address]        Equal Access to Services     [facility]: Hadii aad ku hadasho Soomaali, waaxda luqadaha, qaybta kaalmada rashaun, yvonne jiménez . So [facility] [phone].    ATENCIÓN: Si habla español, tiene a aden disposición servicios gratuitos de asistencia lingüística. Llame al [phone].    We comply with applicable federal civil rights laws and [place] laws. We do not discriminate on the basis of race, color, national origin, age, disability, sex, sexual orientation, or gender identity.            Thank you!     Thank you for choosing [facility]  for your care. Our goal is always to provide you with excellent care. Hearing back from our patients is one way we can continue to improve our services. Please take a few minutes to complete the written survey that you may receive in the mail after your visit with us. Thank you!             Your Updated Medication List - Protect others around you: Learn how to safely use, store and throw away your medicines at www.disposemymeds.org.          This list is accurate as of 7/23/18 11:59 PM.  Always use your most recent med list.                   Brand Name Dispense Instructions for use Diagnosis    amLODIPine 10 MG tablet    NORVASC    90 tablet    Take 1 tablet (10 mg) by mouth daily    HTN (hypertension), malignant       BASAGLAR 100 UNIT/ML injection     30 mL    Inject 20 Units Subcutaneous daily    Diabetes mellitus without complication (H)       * BD VELIA U/F 32G X 4 MM   Generic drug:  insulin  pen needle     300 each    INJECTING THREE TIMES DAILY BEFORE MEALS    Uncontrolled type 2 diabetes mellitus without complication, without long-term current use of insulin (H)       * insulin pen needle 32G X 6 MM    NOVOFINE    100 each    Use 1 daily or as directed.    Uncontrolled type 2 diabetes mellitus without complication, with long-term current use of insulin (H)       Blood Pressure Kit      Blood pressure machine        * COOL BLOOD GLUCOSE TEST STRIPS test strip   Generic drug:  blood glucose monitoring      Dispense item covered by pt ins. E11.9 NIDDM type II - Test 3 times/day, Reason: Unstable diabetes        * CONTOUR NEXT TEST test strip   Generic drug:  blood glucose monitoring     300 strip    TESTING THREE TIMES DAILY    Uncontrolled type 2 diabetes mellitus without complication, without long-term current use of insulin (H)       FIFTY50 GLUCOSE METER 2.0 w/Device Kit      3 times daily        insulin aspart 100 UNIT/ML injection    NovoLOG PEN     Inject 5 Units Subcutaneous 3 times daily (before meals)        lancets 28G Misc      Dispense item covered by pt ins. E11.9 NIDDM type II - Test 3 times/day, Reason: Unstable diabetes        liraglutide 18 MG/3ML soln    VICTOZA PEN    9 mL    Inject 0.6 mg Subcutaneous daily    Uncontrolled type 2 diabetes mellitus without complication, without long-term current use of insulin (H)       lisinopril 40 MG tablet    PRINIVIL/ZESTRIL    90 tablet    Take 1 tablet (40 mg) by mouth daily    HTN (hypertension), malignant       metFORMIN 1000 MG tablet    GLUCOPHAGE     Take 1,000 mg by mouth 2 times daily (with meals)        oxyCODONE-acetaminophen 5-325 MG per tablet    PERCOCET    90 tablet    Take 1 tablet by mouth every 4 hours as needed    Malignant neoplasm of appendix vermiformis (H)       pravastatin 20 MG tablet    PRAVACHOL    90 tablet    Take 1 tablet (20 mg) by mouth At Bedtime    Uncontrolled type 2 diabetes mellitus without complication,  without long-term current use of insulin (H)       * traMADol 50 MG tablet    ULTRAM     Take 50 mg by mouth every 6 hours as needed        * traMADol 50 MG tablet    ULTRAM    90 tablet    Take 1 tablet (50 mg) by mouth 4 times daily    Malignant neoplasm of appendix vermiformis (H)       * Notice:  This list has 6 medication(s) that are the same as other medications prescribed for you. Read the directions carefully, and ask your doctor or other care provider to review them with you.

## 2018-07-23 NOTE — PROGRESS NOTES
[facility] HEART CARE   CARDIOLOGY CONSULT     Jaswant Chong   [address]  [place] [address]    Mik Kumari     Chief Complaint   Patient presents with     Follow Up For     stress test        HPI:   Mr. Chong is a 48 year old male who presents for cardiology consult as a result of abnormal cardiolite stress test. Patient has a history of malignant HTN, HLD, obesity, uncontrolled DMII, adenocarcinoma of the appendix, right adrenal mass, fatty liver disease, history of splenectomy, recurrent ventral hernia and history of depression.    Patient was seen by his PCP on 7/6/18 for left upper chest discomfort for past two years. Increased concern as the patients father recently underwent a 4V CABG. Reports this discomfort with heavy lifting and when hunting at elevation this past winter. 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 Reported symptoms occurring approximately 2 times per week.     Given his significant risk factors for CAD, as NM MPI Lexiscan stress test was ordered. This study was completed on 7/19/18. Patient was found to have dilated LV, mild hypokinesis of the cardiac apex with EF at 55% which is mildly reduced with stress. Questionable foal fixed perfusion abnormality noted anteriorly and laterally in the LCX distribution. Patient remained asymptomatic throughout testing, he did develop inferior T-wave inversion after lexiscan administered which did resolve during testing. Vitals remained stable.     Patient has a history of uncontrolled type II DM. Has not been checking his blood glucose levels, he was on insulin management with fixed scheduled dosing and Victoza. Reported that he had been off of his medications since this past March and was recently started back on long acting Insulin and Lisinopril. Recent Hgb A1C 13.6% on 5/30//18.    Patient with history of HLD, currently on Pravastatin.  Consider high intensity statin with chronic DM II. Last lipid panel 5/30/18 with total cholesterol 235, triglycerides 318,  and HDL 31.     Patient with a history of malignant HTN. He was recently started on Amlodipine 10 mg daily. Consideration for beta blocker with likely CAD.     Today patient reports increased symptoms. Reports exertional chest pressure regularly. He works as a  and notes this chest pressure when walking the dogs. No radiation of pain and no associated n/v or diaphoresis. Additionally, he reports increased GLORIA over the past two months.     IMAGING RESULTS:   NM MPI WITH LEXISCAN     HISTORY:47 years Male,; Other chest pain     COMPARISON: None.     TECHNIQUE: Gated SPECT with wall motion and ejection fraction  calculation. Stress was performed via Lexiscan pharmacologic  technique. Please see the accompanying internal medicine report for  additional details.  DOSE (Stress/Rest): 43.40 mCi/9.79 mCi Tc-99m Sestamibi ?     FINDINGS: There is good uptake of activity by the left ventricle. The  left ventricular size is dilated, with an EDV of 218 ml.     There are no areas of reversible myocardial perfusion deficit to  suggest ischemia. Small persistent abnormality laterally and  anteriorly.     Mild hypokinesis of the cardiac apex. The ejection fraction is mildly  decreased, at 55%.            IMPRESSION:   1.  Left ventricular dilatation with an end-diastolic volume of 218  mL.     2.  Mild decrease in ejection fraction of 55%. Focal hypokinesia at  the cardiac apex.     3.  Questionable focal fixed perfusion abnormality anteriorly and  laterally in the left circumflex distribution.     MARÍA ELENA GAMINO MD    Lexiscan Cardiolite Stress Test  DOS: 7/19/2018 2:05 PM    Lexiscan and Cardiolite injected without difficulty  Patient was asymptomatic throughout the test.  Developed inferior T-wave inversion after Lexiscan administration,  resolved with time.  Vital signs remained  stable.    Conclusion: Completed Lexiscan Cardiolite stress test.  See separate  report regarding imaging for ischemia.     Signed, Max Balbuena MD  Internal Medicine & Pediatrics    MAX BALBUENA MD                   PAST MEDICAL HISTORY:   Past Medical History:   Diagnosis Date     Malignant neoplasm of appendix (H)     H/O     Type 2 diabetes mellitus without complications (H)     Type 2     Ventral hernia without obstruction or gangrene     No Comments Provided     Vesicointestinal fistula     history of, secondary to diverticulitis.          FAMILY HISTORY:   Family History   Problem Relation Age of Onset     Diabetes Father      Diabetes,type 2     Diabetes Paternal Grandmother      Diabetes,type 1          PAST SURGICAL HISTORY:   Past Surgical History:   Procedure Laterality Date     COLON SURGERY      2006, Hemicolectomy with appendiceal cancer noted, mucinous type.     EXCISE CYST GENERIC (LOCATION)      sebaceous, scalp     OTHER SURGICAL HISTORY      2007,PYF517,LYMPH NODE DISSECTION     OTHER SURGICAL HISTORY      03/08,,HERNIA REPAIR,Repair of surgical wound hernia, metastatic cancer incidentally noted.     OTHER SURGICAL HISTORY      04/08,83838.9,HX ABDOMEN PERITONEUM OMENTUM SURGERY,removal of left hemidiaphragm and omentum [Other] as well as intraperitoneal chemotherapy, 5 FU and Oxaliplatin for metastatic mucinous adenocarcinoma of the appendix.[[[     OTHER SURGICAL HISTORY      04/09,,HERNIA REPAIR, Ventral hernia repair, recurrent appendiceal carcinoma, resection of 7 centimeter left upper quadrant tumor with splenectomy, Surgeon, Dr. Marks     OTHER SURGICAL HISTORY      11/2001,,HERNIA REPAIR,Reduction of ventral hernia and mesh repair of hernia     OTHER SURGICAL HISTORY      12/2012,,HERNIA REPAIR,Removal mesh with infection 12/12     OTHER SURGICAL HISTORY      4/25/13,668436,OTHER,left thumb, Dr. Donald          SOCIAL HISTORY:   Social History     Social History      Marital status:      Spouse name: N/A     Number of children: N/A     Years of education: N/A     Social History Main Topics     Smoking status: Never Smoker     Smokeless tobacco: Never Used     Alcohol use 0.0 oz/week      Comment: Alcoholic Drinks/day: social     Drug use: Not on file      Comment: Drug use: No     Sexual activity: Not on file     Other Topics Concern     Not on file     Social History Narrative     Owns a business doing sales.      Patient has never smoked.     Alcohol Use - yes, once or twice a month    Preloaded 04/12/2013          CURRENT MEDICATIONS:   Prior to Admission medications    Medication Sig Start Date End Date Taking? Authorizing Provider   amLODIPine (NORVASC) 10 MG tablet Take 1 tablet (10 mg) by mouth daily 7/6/18   Mik Kumari MD   BASAGLAR 100 UNIT/ML injection Inject 20 Units Subcutaneous daily 7/19/18   Mik Kumari MD   BD VELIA U/F 32G X 4 MM insulin pen needle INJECTING THREE TIMES DAILY BEFORE MEALS 6/19/18   Mik Kumari MD   blood glucose monitoring (COOL BLOOD GLUCOSE TEST STRIPS) test strip Dispense item covered by pt ins. E11.9 NIDDM type II - Test 3 times/day, Reason: Unstable diabetes 6/9/17   Reported, Patient   Blood Glucose Monitoring Suppl (FIFTY50 GLUCOSE METER 2.0) W/DEVICE KIT 3 times daily 6/12/17   Reported, Patient   Blood Pressure KIT Blood pressure machine 11/8/16   Reported, Patient   CONTOUR NEXT TEST test strip TESTING THREE TIMES DAILY 6/19/18   Mik Kumari MD   insulin aspart (NOVOLOG PEN) 100 UNIT/ML injection Inject 5 Units Subcutaneous 3 times daily (before meals) 9/12/17   Reported, Patient   insulin pen needle (NOVOFINE) 32G X 6 MM Use 1 daily or as directed. 7/19/18   Mik Kumari MD   lancets 28G MISC Dispense item covered by pt ins. E11.9 NIDDM type II - Test 3 times/day, Reason: Unstable diabetes 6/9/17   Reported, Patient   liraglutide (VICTOZA PEN) 18 MG/3ML soln Inject 0.6 mg Subcutaneous daily 5/30/18   [place],  "MD Mik   lisinopril (PRINIVIL/ZESTRIL) 40 MG tablet Take 1 tablet (40 mg) by mouth daily 5/31/18   Mik Kumari MD   metFORMIN (GLUCOPHAGE) 1000 MG tablet Take 1,000 mg by mouth 2 times daily (with meals) 9/12/17   Reported, Patient   oxyCODONE-acetaminophen (PERCOCET) 5-325 MG per tablet Take 1 tablet by mouth every 4 hours as needed 7/19/18   Mik Kumari MD   pravastatin (PRAVACHOL) 20 MG tablet Take 1 tablet (20 mg) by mouth At Bedtime 5/31/18   Mik Kumari MD   traMADol (ULTRAM) 50 MG tablet Take 1 tablet (50 mg) by mouth 4 times daily 5/30/18   Mik Kumari MD   tramadol (ULTRAM) 50 MG tablet Take 50 mg by mouth every 6 hours as needed     Reported, Patient          ALLERGIES:   No Known Allergies       ROS:   CONSTITUTIONAL: No weight changes, fever, chills or weakness. Positive for fatigue.   CARDIOVASCULAR: Positive for exertional chest pressure. No palpitations or lower extremity edema.   RESPIRATORY: Positive for increased dyspnea upon exertion, no reported cough or sputum production.   GASTROINTESTINAL: No reported increased abdominal pain, nausea or vomiting. Reports that he has an inoperable ventral hernia.   NEUROLOGICAL: No lightheadedness, dizziness, syncope, ataxia or weakness.   HEMATOLOGIC: No anemia, bleeding or bruising.   PSYCHIATRIC: Positive for history of depression.   ENDOCRINOLOGIC: No reports of sweating, cold or heat intolerance.    SKIN: No abnormal rashes or itching.       PHYSICAL EXAM:   /80 (BP Location: Right arm, Patient Position: Sitting, Cuff Size: Adult Large)  Ht 1.905 m (6' 3\")  Wt 139.3 kg (307 lb)  BMI 38.37 kg/m2  GENERAL: The patient is a well-developed, well-nourished, in no apparent distress. Alert and oriented x3.   HEENT: Head is normocephalic and atraumatic. Eyes are symmetrical with normal visual tracking. Nares appeared normal without nasal drainage. Mucous membranes are moist.   NECK: Supple. 1+ carotids with no carotid bruits. No visible " JVD.   HEART: Regular rate and rhythm, S1S2 present without murmur, rub or gallop.   LUNGS: Respirations regular and unlabored. Clear to auscultation.   GI: Abdomen is distended by ventral hernia.   EXTREMITIES: No peripheral edema present.    NEUROLOGIC: Alert and oriented X3. No focal neurologic deficits.   SKIN: No jaundice. No rashes or visible skin lesions present.       EKG:    NSR with nonspecific T wave changes, no interval changes. HR 81 bpm.    LAB RESULTS:   Office Visit on 05/30/2018   Component Date Value Ref Range Status     Hemoglobin A1C 05/30/2018 13.6* 4.0 - 6.0 % Final     Cholesterol 05/30/2018 235* <200 mg/dL Final     Triglycerides 05/30/2018 318* <150 mg/dL Final     HDL Cholesterol 05/30/2018 31  23 - 92 mg/dL Final     LDL Cholesterol Calculated 05/30/2018 140* <100 mg/dL Final     Non HDL Cholesterol 05/30/2018 204* <130 mg/dL Final     Sodium 05/30/2018 132* 134 - 144 mmol/L Final     Potassium 05/30/2018 4.0  3.5 - 5.1 mmol/L Final     Chloride 05/30/2018 95* 98 - 107 mmol/L Final     Carbon Dioxide 05/30/2018 28  21 - 31 mmol/L Final     Anion Gap 05/30/2018 9  3 - 14 mmol/L Final     Glucose 05/30/2018 330* 70 - 105 mg/dL Final     Urea Nitrogen 05/30/2018 13  7 - 25 mg/dL Final     Creatinine 05/30/2018 0.88  0.70 - 1.30 mg/dL Final     GFR Estimate 05/30/2018 >90  >60 mL/min/1.7m2 Final     GFR Estimate If Black 05/30/2018 >90  >60 mL/min/1.7m2 Final     Calcium 05/30/2018 9.3  8.6 - 10.3 mg/dL Final     Bilirubin Total 05/30/2018 0.6  0.3 - 1.0 mg/dL Final     Albumin 05/30/2018 4.2  3.5 - 5.7 g/dL Final     Protein Total 05/30/2018 7.5  6.4 - 8.9 g/dL Final     Alkaline Phosphatase 05/30/2018 130* 34 - 104 U/L Final     ALT 05/30/2018 56* 7 - 52 U/L Final     AST 05/30/2018 42* 13 - 39 U/L Final     Pain Drug SCR UR W RPTD Meds 05/30/2018 FINAL   Final     Creatinine Urine 05/30/2018 190  mg/dL Final     Albumin Urine mg/L 05/30/2018 1570  mg/L Final     Albumin Urine mg/g Cr  05/30/2018 826.32* 0 - 17 mg/g Cr Final          ASSESSMENT:   Jaswant Chong presents for cardiology consult as a result of positive cardiolite stress test with history of angina pectoris. Patient has a history of malignant HTN, HLD, obesity, uncontrolled DMII, adenocarcinoma of the appendix, right adrenal mass, fatty liver disease, history of splenectomy, recurrent ventral hernia and history of depression.   Given his symptoms and significant risk factors for CAD, a NM MPI Lexiscan stress test was ordered. This study was completed on 7/19/18. Patient was found to have dilated LV, mild hypokinesis of the cardiac apex with EF at 55% which is mildly reduced with stress. Questionable foal fixed perfusion abnormality noted anteriorly and laterally in the LCX distribution. Patient remained asymptomatic throughout testing, he did develop inferior T-wave inversion after lexiscan administered which did resolve during testing. Vitals remained stable.       PLAN:   1. Abnormal cardiovascular stress test  NM MPI Lexiscan stress test with LV dilation and reduced function on this stress study. No reversible perfusion defects, however, he was found to have fixed perfusion defect anteriorly and laterally in the LCX distribution suggestive of past infarct.  As patient has had progression of symptoms with exertional chest pressure and increased GLORIA, reduced LV function on stress study,  it has been recommended that he proceed with coronary angiography.   Significant CAD risk factors with uncontrolled DM, obesity, HLD and family history premature CAD.     - Coronary Angiography Adult Order; Future    2. Exertional chest pain  See above.     - Coronary Angiography Adult Order; Future    3. GLORIA (dyspnea on exertion)  - Coronary Angiography Adult Order; Future    4. Mixed hyperlipidemia  Continue with Pravastatin as previous. Consider   - Coronary Angiography Adult Order; Future    5. Uncontrolled type 2 diabetes mellitus without  complication, with long-term current use of insulin (H)  Discussed importance of DM control for cardiovascular risk factor optimization. Recent A1C elevated to 13.6. Patient recently discontinued all his medications. He has since then restarted long acting insulin and Lisinopril. He should consider going back on metformin and possibly victoza. Referral has been sent for diabetes clinic follow-up.     - Coronary Angiography Adult Order; Future    6. HTN (hypertension), malignant  /80 today. He will continue to monitor his pressures.  Consider starting beta blocker if angio suggestive CAD.     - Coronary Angiography Adult Order; Future    7. Family history of premature CAD  - Coronary Angiography Adult Order; Future        Thank you for allowing me to participate in the care of your patient. Please do not hesitate to contact me if you have any questions.     Kira Salvador

## 2018-07-24 ENCOUNTER — TELEPHONE (OUTPATIENT)
Dept: CARDIOLOGY | Facility: OTHER | Age: 48
End: 2018-07-24

## 2018-07-24 DIAGNOSIS — R94.39 ABNORMAL CARDIOVASCULAR STRESS TEST: Primary | ICD-10-CM

## 2018-07-24 NOTE — TELEPHONE ENCOUNTER
Call to pt to confirm angiogram appt for Thursday, July 26th with an arrival time of 1230. Pt states he did receive a teaching packet and has no questions at this time. Pt verbalizes instructions. Ludy Arguelles RN on 7/24/2018 at 11:07 AM

## 2018-07-25 NOTE — PROGRESS NOTES
Attempted to contact pt for reminder regarding coronary angiogram scheduled for Thursday, 7/26. No answer.

## 2018-07-26 ENCOUNTER — APPOINTMENT (OUTPATIENT)
Dept: CARDIOLOGY | Facility: CLINIC | Age: 48
End: 2018-07-26
Attending: NURSE PRACTITIONER
Payer: COMMERCIAL

## 2018-07-26 ENCOUNTER — APPOINTMENT (OUTPATIENT)
Dept: MEDSURG UNIT | Facility: CLINIC | Age: 48
End: 2018-07-26
Payer: COMMERCIAL

## 2018-07-26 ENCOUNTER — APPOINTMENT (OUTPATIENT)
Dept: GENERAL RADIOLOGY | Facility: CLINIC | Age: 48
End: 2018-07-26
Attending: STUDENT IN AN ORGANIZED HEALTH CARE EDUCATION/TRAINING PROGRAM
Payer: COMMERCIAL

## 2018-07-26 ENCOUNTER — HOSPITAL ENCOUNTER (OUTPATIENT)
Facility: CLINIC | Age: 48
Discharge: HOME | End: 2018-07-27
Attending: INTERNAL MEDICINE | Admitting: INTERNAL MEDICINE
Payer: COMMERCIAL

## 2018-07-26 DIAGNOSIS — R07.9 EXERTIONAL CHEST PAIN: ICD-10-CM

## 2018-07-26 DIAGNOSIS — R94.39 ABNORMAL CARDIOVASCULAR STRESS TEST: ICD-10-CM

## 2018-07-26 DIAGNOSIS — Z82.49 FAMILY HISTORY OF PREMATURE CAD: ICD-10-CM

## 2018-07-26 DIAGNOSIS — R06.09 DOE (DYSPNEA ON EXERTION): ICD-10-CM

## 2018-07-26 DIAGNOSIS — E78.2 MIXED HYPERLIPIDEMIA: ICD-10-CM

## 2018-07-26 DIAGNOSIS — I10 HTN (HYPERTENSION), MALIGNANT: ICD-10-CM

## 2018-07-26 LAB
ANION GAP SERPL CALCULATED.3IONS-SCNC: 6 MMOL/L (ref 3–14)
BUN SERPL-MCNC: 11 MG/DL (ref 7–30)
CALCIUM SERPL-MCNC: 9.3 MG/DL (ref 8.5–10.1)
CHLORIDE SERPL-SCNC: 101 MMOL/L (ref 94–109)
CO2 SERPL-SCNC: 29 MMOL/L (ref 20–32)
CREAT SERPL-MCNC: 0.79 MG/DL (ref 0.66–1.25)
ERYTHROCYTE [DISTWIDTH] IN BLOOD BY AUTOMATED COUNT: 13.2 % (ref 10–15)
GFR SERPL CREATININE-BSD FRML MDRD: >90 ML/MIN/1.7M2
GLUCOSE BLDC GLUCOMTR-MCNC: 209 MG/DL (ref 70–99)
GLUCOSE SERPL-MCNC: 211 MG/DL (ref 70–99)
HCT VFR BLD AUTO: 43.9 % (ref 40–53)
HGB BLD-MCNC: 15.5 G/DL (ref 13.3–17.7)
MCH RBC QN AUTO: 32.3 PG (ref 26.5–33)
MCHC RBC AUTO-ENTMCNC: 35.3 G/DL (ref 31.5–36.5)
MCV RBC AUTO: 92 FL (ref 78–100)
PLATELET # BLD AUTO: 287 10E9/L (ref 150–450)
POTASSIUM SERPL-SCNC: 3.8 MMOL/L (ref 3.4–5.3)
RBC # BLD AUTO: 4.8 10E12/L (ref 4.4–5.9)
SODIUM SERPL-SCNC: 136 MMOL/L (ref 133–144)
WBC # BLD AUTO: 8.4 10E9/L (ref 4–11)

## 2018-07-26 PROCEDURE — 27210787 ZZH MANIFOLD CR2

## 2018-07-26 PROCEDURE — 25000128 H RX IP 250 OP 636: Performed by: STUDENT IN AN ORGANIZED HEALTH CARE EDUCATION/TRAINING PROGRAM

## 2018-07-26 PROCEDURE — 25000132 ZZH RX MED GY IP 250 OP 250 PS 637: Performed by: NURSE PRACTITIONER

## 2018-07-26 PROCEDURE — 84484 ASSAY OF TROPONIN QUANT: CPT | Performed by: STUDENT IN AN ORGANIZED HEALTH CARE EDUCATION/TRAINING PROGRAM

## 2018-07-26 PROCEDURE — 93454 CORONARY ARTERY ANGIO S&I: CPT | Mod: 26 | Performed by: INTERNAL MEDICINE

## 2018-07-26 PROCEDURE — C1894 INTRO/SHEATH, NON-LASER: HCPCS

## 2018-07-26 PROCEDURE — 25000125 ZZHC RX 250: Performed by: HOSPITALIST

## 2018-07-26 PROCEDURE — 27210946 ZZH KIT HC TOTES DISP CR8

## 2018-07-26 PROCEDURE — 85027 COMPLETE CBC AUTOMATED: CPT | Performed by: NURSE PRACTITIONER

## 2018-07-26 PROCEDURE — 27210762 ZZH DEVICE SUTURELESS SECUREMENT EA CR2

## 2018-07-26 PROCEDURE — 40000172 ZZH STATISTIC PROCEDURE PREP ONLY

## 2018-07-26 PROCEDURE — 27210843 ZZH DEVICE COMPRESSION CR12

## 2018-07-26 PROCEDURE — 82962 GLUCOSE BLOOD TEST: CPT

## 2018-07-26 PROCEDURE — 40000065 ZZH STATISTIC EKG NON-CHARGEABLE

## 2018-07-26 PROCEDURE — 99152 MOD SED SAME PHYS/QHP 5/>YRS: CPT

## 2018-07-26 PROCEDURE — 80048 BASIC METABOLIC PNL TOTAL CA: CPT | Performed by: NURSE PRACTITIONER

## 2018-07-26 PROCEDURE — 25000128 H RX IP 250 OP 636: Performed by: NURSE PRACTITIONER

## 2018-07-26 PROCEDURE — 40000986 XR CHEST PORT 1 VW

## 2018-07-26 PROCEDURE — C1769 GUIDE WIRE: HCPCS

## 2018-07-26 PROCEDURE — 36415 COLL VENOUS BLD VENIPUNCTURE: CPT | Performed by: NURSE PRACTITIONER

## 2018-07-26 PROCEDURE — 25000128 H RX IP 250 OP 636: Performed by: HOSPITALIST

## 2018-07-26 PROCEDURE — 93454 CORONARY ARTERY ANGIO S&I: CPT

## 2018-07-26 PROCEDURE — 27211089 ZZH KIT ACIST INJECTOR CR3

## 2018-07-26 PROCEDURE — 93005 ELECTROCARDIOGRAM TRACING: CPT

## 2018-07-26 PROCEDURE — 93010 ELECTROCARDIOGRAM REPORT: CPT | Mod: 76 | Performed by: INTERNAL MEDICINE

## 2018-07-26 RX ORDER — PROTAMINE SULFATE 10 MG/ML
1-5 INJECTION, SOLUTION INTRAVENOUS
Status: DISCONTINUED | OUTPATIENT
Start: 2018-07-26 | End: 2018-07-26 | Stop reason: HOSPADM

## 2018-07-26 RX ORDER — ENALAPRILAT 1.25 MG/ML
1.25-2.5 INJECTION INTRAVENOUS
Status: DISCONTINUED | OUTPATIENT
Start: 2018-07-26 | End: 2018-07-26 | Stop reason: HOSPADM

## 2018-07-26 RX ORDER — LIDOCAINE HYDROCHLORIDE 10 MG/ML
30 INJECTION, SOLUTION EPIDURAL; INFILTRATION; INTRACAUDAL; PERINEURAL
Status: DISCONTINUED | OUTPATIENT
Start: 2018-07-26 | End: 2018-07-26 | Stop reason: HOSPADM

## 2018-07-26 RX ORDER — METHYLPREDNISOLONE SODIUM SUCCINATE 125 MG/2ML
125 INJECTION, POWDER, LYOPHILIZED, FOR SOLUTION INTRAMUSCULAR; INTRAVENOUS
Status: DISCONTINUED | OUTPATIENT
Start: 2018-07-26 | End: 2018-07-26 | Stop reason: HOSPADM

## 2018-07-26 RX ORDER — ONDANSETRON 2 MG/ML
4 INJECTION INTRAMUSCULAR; INTRAVENOUS EVERY 4 HOURS PRN
Status: DISCONTINUED | OUTPATIENT
Start: 2018-07-26 | End: 2018-07-26 | Stop reason: HOSPADM

## 2018-07-26 RX ORDER — ATROPINE SULFATE 0.1 MG/ML
.5-1 INJECTION INTRAVENOUS
Status: DISCONTINUED | OUTPATIENT
Start: 2018-07-26 | End: 2018-07-26 | Stop reason: HOSPADM

## 2018-07-26 RX ORDER — FUROSEMIDE 10 MG/ML
20 INJECTION INTRAMUSCULAR; INTRAVENOUS ONCE
Status: COMPLETED | OUTPATIENT
Start: 2018-07-26 | End: 2018-07-26

## 2018-07-26 RX ORDER — ATROPINE SULFATE 0.1 MG/ML
0.5 INJECTION INTRAVENOUS EVERY 5 MIN PRN
Status: ACTIVE | OUTPATIENT
Start: 2018-07-26 | End: 2018-07-27

## 2018-07-26 RX ORDER — FLUMAZENIL 0.1 MG/ML
0.2 INJECTION, SOLUTION INTRAVENOUS
Status: ACTIVE | OUTPATIENT
Start: 2018-07-26 | End: 2018-07-27

## 2018-07-26 RX ORDER — EPTIFIBATIDE 2 MG/ML
15 INJECTION, SOLUTION INTRAVENOUS CONTINUOUS PRN
Status: DISCONTINUED | OUTPATIENT
Start: 2018-07-26 | End: 2018-07-26 | Stop reason: HOSPADM

## 2018-07-26 RX ORDER — ASPIRIN 81 MG/1
81-324 TABLET, CHEWABLE ORAL
Status: DISCONTINUED | OUTPATIENT
Start: 2018-07-26 | End: 2018-07-26 | Stop reason: HOSPADM

## 2018-07-26 RX ORDER — PRASUGREL 10 MG/1
10-60 TABLET, FILM COATED ORAL
Status: DISCONTINUED | OUTPATIENT
Start: 2018-07-26 | End: 2018-07-26 | Stop reason: HOSPADM

## 2018-07-26 RX ORDER — EPTIFIBATIDE 2 MG/ML
180 INJECTION, SOLUTION INTRAVENOUS EVERY 10 MIN PRN
Status: DISCONTINUED | OUTPATIENT
Start: 2018-07-26 | End: 2018-07-26 | Stop reason: HOSPADM

## 2018-07-26 RX ORDER — DEXTROSE MONOHYDRATE 25 G/50ML
12.5-5 INJECTION, SOLUTION INTRAVENOUS EVERY 30 MIN PRN
Status: DISCONTINUED | OUTPATIENT
Start: 2018-07-26 | End: 2018-07-26 | Stop reason: HOSPADM

## 2018-07-26 RX ORDER — SODIUM NITROPRUSSIDE 25 MG/ML
100-200 INJECTION INTRAVENOUS
Status: DISCONTINUED | OUTPATIENT
Start: 2018-07-26 | End: 2018-07-26 | Stop reason: HOSPADM

## 2018-07-26 RX ORDER — NALOXONE HYDROCHLORIDE 0.4 MG/ML
.2-.4 INJECTION, SOLUTION INTRAMUSCULAR; INTRAVENOUS; SUBCUTANEOUS
Status: DISCONTINUED | OUTPATIENT
Start: 2018-07-26 | End: 2018-07-26

## 2018-07-26 RX ORDER — FUROSEMIDE 10 MG/ML
20-100 INJECTION INTRAMUSCULAR; INTRAVENOUS
Status: DISCONTINUED | OUTPATIENT
Start: 2018-07-26 | End: 2018-07-26 | Stop reason: HOSPADM

## 2018-07-26 RX ORDER — NALOXONE HYDROCHLORIDE 0.4 MG/ML
.1-.4 INJECTION, SOLUTION INTRAMUSCULAR; INTRAVENOUS; SUBCUTANEOUS
Status: DISCONTINUED | OUTPATIENT
Start: 2018-07-26 | End: 2018-07-27 | Stop reason: HOSPADM

## 2018-07-26 RX ORDER — NITROGLYCERIN 0.4 MG/1
0.4 TABLET SUBLINGUAL EVERY 5 MIN PRN
Status: DISCONTINUED | OUTPATIENT
Start: 2018-07-26 | End: 2018-07-26 | Stop reason: HOSPADM

## 2018-07-26 RX ORDER — ONDANSETRON 2 MG/ML
4 INJECTION INTRAMUSCULAR; INTRAVENOUS EVERY 6 HOURS PRN
Status: DISCONTINUED | OUTPATIENT
Start: 2018-07-26 | End: 2018-07-27 | Stop reason: HOSPADM

## 2018-07-26 RX ORDER — HYDRALAZINE HYDROCHLORIDE 20 MG/ML
10-20 INJECTION INTRAMUSCULAR; INTRAVENOUS
Status: COMPLETED | OUTPATIENT
Start: 2018-07-26 | End: 2018-07-26

## 2018-07-26 RX ORDER — NIFEDIPINE 10 MG/1
10 CAPSULE ORAL
Status: DISCONTINUED | OUTPATIENT
Start: 2018-07-26 | End: 2018-07-26 | Stop reason: HOSPADM

## 2018-07-26 RX ORDER — SODIUM CHLORIDE 9 MG/ML
INJECTION, SOLUTION INTRAVENOUS CONTINUOUS
Status: DISCONTINUED | OUTPATIENT
Start: 2018-07-26 | End: 2018-07-26 | Stop reason: HOSPADM

## 2018-07-26 RX ORDER — DOPAMINE HYDROCHLORIDE 160 MG/100ML
2-20 INJECTION, SOLUTION INTRAVENOUS CONTINUOUS PRN
Status: DISCONTINUED | OUTPATIENT
Start: 2018-07-26 | End: 2018-07-26 | Stop reason: HOSPADM

## 2018-07-26 RX ORDER — PHENYLEPHRINE HCL IN 0.9% NACL 1 MG/10 ML
20-100 SYRINGE (ML) INTRAVENOUS
Status: DISCONTINUED | OUTPATIENT
Start: 2018-07-26 | End: 2018-07-26 | Stop reason: HOSPADM

## 2018-07-26 RX ORDER — NICARDIPINE HYDROCHLORIDE 2.5 MG/ML
100 INJECTION INTRAVENOUS
Status: DISCONTINUED | OUTPATIENT
Start: 2018-07-26 | End: 2018-07-26 | Stop reason: HOSPADM

## 2018-07-26 RX ORDER — METOPROLOL TARTRATE 1 MG/ML
5 INJECTION, SOLUTION INTRAVENOUS EVERY 5 MIN PRN
Status: DISCONTINUED | OUTPATIENT
Start: 2018-07-26 | End: 2018-07-26 | Stop reason: HOSPADM

## 2018-07-26 RX ORDER — PROTAMINE SULFATE 10 MG/ML
25-100 INJECTION, SOLUTION INTRAVENOUS EVERY 5 MIN PRN
Status: DISCONTINUED | OUTPATIENT
Start: 2018-07-26 | End: 2018-07-26 | Stop reason: HOSPADM

## 2018-07-26 RX ORDER — DIPHENHYDRAMINE HYDROCHLORIDE 50 MG/ML
25-50 INJECTION INTRAMUSCULAR; INTRAVENOUS
Status: DISCONTINUED | OUTPATIENT
Start: 2018-07-26 | End: 2018-07-26 | Stop reason: HOSPADM

## 2018-07-26 RX ORDER — NITROGLYCERIN 20 MG/100ML
.07-1.43 INJECTION INTRAVENOUS CONTINUOUS PRN
Status: DISCONTINUED | OUTPATIENT
Start: 2018-07-26 | End: 2018-07-26 | Stop reason: HOSPADM

## 2018-07-26 RX ORDER — ARGATROBAN 1 MG/ML
350 INJECTION, SOLUTION INTRAVENOUS
Status: DISCONTINUED | OUTPATIENT
Start: 2018-07-26 | End: 2018-07-26 | Stop reason: HOSPADM

## 2018-07-26 RX ORDER — ASPIRIN 325 MG
325 TABLET ORAL
Status: DISCONTINUED | OUTPATIENT
Start: 2018-07-26 | End: 2018-07-26 | Stop reason: HOSPADM

## 2018-07-26 RX ORDER — FLUMAZENIL 0.1 MG/ML
0.2 INJECTION, SOLUTION INTRAVENOUS
Status: DISCONTINUED | OUTPATIENT
Start: 2018-07-26 | End: 2018-07-26 | Stop reason: HOSPADM

## 2018-07-26 RX ORDER — CLOPIDOGREL BISULFATE 75 MG/1
75 TABLET ORAL
Status: DISCONTINUED | OUTPATIENT
Start: 2018-07-26 | End: 2018-07-26 | Stop reason: HOSPADM

## 2018-07-26 RX ORDER — EPINEPHRINE 1 MG/ML
0.3 INJECTION, SOLUTION, CONCENTRATE INTRAVENOUS
Status: DISCONTINUED | OUTPATIENT
Start: 2018-07-26 | End: 2018-07-26 | Stop reason: HOSPADM

## 2018-07-26 RX ORDER — CLOPIDOGREL BISULFATE 75 MG/1
300-600 TABLET ORAL
Status: DISCONTINUED | OUTPATIENT
Start: 2018-07-26 | End: 2018-07-26 | Stop reason: HOSPADM

## 2018-07-26 RX ORDER — LIDOCAINE 40 MG/G
CREAM TOPICAL
Status: DISCONTINUED | OUTPATIENT
Start: 2018-07-26 | End: 2018-07-27 | Stop reason: HOSPADM

## 2018-07-26 RX ORDER — VERAPAMIL HYDROCHLORIDE 2.5 MG/ML
1-5 INJECTION, SOLUTION INTRAVENOUS
Status: DISCONTINUED | OUTPATIENT
Start: 2018-07-26 | End: 2018-07-26 | Stop reason: HOSPADM

## 2018-07-26 RX ORDER — LORAZEPAM 2 MG/ML
.5-2 INJECTION INTRAMUSCULAR EVERY 4 HOURS PRN
Status: DISCONTINUED | OUTPATIENT
Start: 2018-07-26 | End: 2018-07-26 | Stop reason: HOSPADM

## 2018-07-26 RX ORDER — POTASSIUM CHLORIDE 7.45 MG/ML
10 INJECTION INTRAVENOUS
Status: DISCONTINUED | OUTPATIENT
Start: 2018-07-26 | End: 2018-07-26 | Stop reason: HOSPADM

## 2018-07-26 RX ORDER — FENTANYL CITRATE 50 UG/ML
25-50 INJECTION, SOLUTION INTRAMUSCULAR; INTRAVENOUS
Status: ACTIVE | OUTPATIENT
Start: 2018-07-26 | End: 2018-07-27

## 2018-07-26 RX ORDER — NALOXONE HYDROCHLORIDE 0.4 MG/ML
0.4 INJECTION, SOLUTION INTRAMUSCULAR; INTRAVENOUS; SUBCUTANEOUS EVERY 5 MIN PRN
Status: DISCONTINUED | OUTPATIENT
Start: 2018-07-26 | End: 2018-07-26 | Stop reason: HOSPADM

## 2018-07-26 RX ORDER — IOPAMIDOL 755 MG/ML
33 INJECTION, SOLUTION INTRAVASCULAR ONCE
Status: COMPLETED | OUTPATIENT
Start: 2018-07-26 | End: 2018-07-26

## 2018-07-26 RX ORDER — NITROGLYCERIN 5 MG/ML
100-200 VIAL (ML) INTRAVENOUS
Status: DISCONTINUED | OUTPATIENT
Start: 2018-07-26 | End: 2018-07-26 | Stop reason: HOSPADM

## 2018-07-26 RX ORDER — LIDOCAINE 40 MG/G
CREAM TOPICAL
Status: DISCONTINUED | OUTPATIENT
Start: 2018-07-26 | End: 2018-07-26 | Stop reason: HOSPADM

## 2018-07-26 RX ORDER — MAGNESIUM HYDROXIDE 1200 MG/15ML
1000 LIQUID ORAL CONTINUOUS PRN
Status: DISCONTINUED | OUTPATIENT
Start: 2018-07-26 | End: 2018-07-26 | Stop reason: HOSPADM

## 2018-07-26 RX ORDER — FENTANYL CITRATE 50 UG/ML
25-50 INJECTION, SOLUTION INTRAMUSCULAR; INTRAVENOUS
Status: DISCONTINUED | OUTPATIENT
Start: 2018-07-26 | End: 2018-07-26 | Stop reason: HOSPADM

## 2018-07-26 RX ORDER — ADENOSINE 3 MG/ML
12-12000 INJECTION, SOLUTION INTRAVENOUS
Status: DISCONTINUED | OUTPATIENT
Start: 2018-07-26 | End: 2018-07-26 | Stop reason: HOSPADM

## 2018-07-26 RX ORDER — ARGATROBAN 1 MG/ML
150 INJECTION, SOLUTION INTRAVENOUS
Status: DISCONTINUED | OUTPATIENT
Start: 2018-07-26 | End: 2018-07-26 | Stop reason: HOSPADM

## 2018-07-26 RX ORDER — HEPARIN SODIUM 1000 [USP'U]/ML
1000-10000 INJECTION, SOLUTION INTRAVENOUS; SUBCUTANEOUS EVERY 5 MIN PRN
Status: DISCONTINUED | OUTPATIENT
Start: 2018-07-26 | End: 2018-07-26 | Stop reason: HOSPADM

## 2018-07-26 RX ORDER — POTASSIUM CHLORIDE 29.8 MG/ML
20 INJECTION INTRAVENOUS
Status: DISCONTINUED | OUTPATIENT
Start: 2018-07-26 | End: 2018-07-26 | Stop reason: HOSPADM

## 2018-07-26 RX ORDER — DOBUTAMINE HYDROCHLORIDE 200 MG/100ML
2-20 INJECTION INTRAVENOUS CONTINUOUS PRN
Status: DISCONTINUED | OUTPATIENT
Start: 2018-07-26 | End: 2018-07-26 | Stop reason: HOSPADM

## 2018-07-26 RX ORDER — NITROGLYCERIN 5 MG/ML
100-500 VIAL (ML) INTRAVENOUS
Status: DISCONTINUED | OUTPATIENT
Start: 2018-07-26 | End: 2018-07-26 | Stop reason: HOSPADM

## 2018-07-26 RX ADMIN — FENTANYL CITRATE 50 MCG: 50 INJECTION, SOLUTION INTRAMUSCULAR; INTRAVENOUS at 16:26

## 2018-07-26 RX ADMIN — HYDRALAZINE HYDROCHLORIDE 20 MG: 20 INJECTION INTRAMUSCULAR; INTRAVENOUS at 16:40

## 2018-07-26 RX ADMIN — FENTANYL CITRATE 50 MCG: 50 INJECTION, SOLUTION INTRAMUSCULAR; INTRAVENOUS at 16:32

## 2018-07-26 RX ADMIN — ONDANSETRON 4 MG: 2 INJECTION INTRAMUSCULAR; INTRAVENOUS at 18:41

## 2018-07-26 RX ADMIN — ASPIRIN 325 MG: 325 TABLET, DELAYED RELEASE ORAL at 13:23

## 2018-07-26 RX ADMIN — HYDRALAZINE HYDROCHLORIDE 20 MG: 20 INJECTION INTRAMUSCULAR; INTRAVENOUS at 16:55

## 2018-07-26 RX ADMIN — HEPARIN SODIUM 500 UNITS: 1000 INJECTION, SOLUTION INTRAVENOUS; SUBCUTANEOUS at 16:33

## 2018-07-26 RX ADMIN — MIDAZOLAM 1 MG: 1 INJECTION INTRAMUSCULAR; INTRAVENOUS at 16:26

## 2018-07-26 RX ADMIN — FUROSEMIDE 20 MG: 10 INJECTION, SOLUTION INTRAVENOUS at 21:57

## 2018-07-26 RX ADMIN — MIDAZOLAM 1 MG: 1 INJECTION INTRAMUSCULAR; INTRAVENOUS at 16:32

## 2018-07-26 RX ADMIN — HEPARIN SODIUM 5000 UNITS: 1000 INJECTION, SOLUTION INTRAVENOUS; SUBCUTANEOUS at 16:26

## 2018-07-26 RX ADMIN — HEPARIN SODIUM 1500 UNITS: 1000 INJECTION, SOLUTION INTRAVENOUS; SUBCUTANEOUS at 16:33

## 2018-07-26 RX ADMIN — SODIUM CHLORIDE: 9 INJECTION, SOLUTION INTRAVENOUS at 13:24

## 2018-07-26 RX ADMIN — IOPAMIDOL 33 ML: 755 INJECTION, SOLUTION INTRAVASCULAR at 16:45

## 2018-07-26 NOTE — IP AVS SNAPSHOT
Unit 6D Observation [address] [address]    Phone:  [phone]    Fax:  [phone]                                       After Visit Summary   7/26/2018    Jaswant Chong    MRN: [id number]           After Visit Summary Signature Page     I have received my discharge instructions, and my questions have been answered. I have discussed any challenges I see with this plan with the nurse or doctor.    ..........................................................................................................................................  Patient/Patient Representative Signature      ..........................................................................................................................................  Patient Representative Print Name and Relationship to Patient    ..................................................               ................................................  Date                                            Time    ..........................................................................................................................................  Reviewed by Signature/Title    ...................................................              ..............................................  Date                                                            Time

## 2018-07-26 NOTE — PROGRESS NOTES
Patient reports feeling chills, chest and abdominal pressure and SOB. CSI paged. Orders for EKG, troponin draw ordered. MD at bedside.

## 2018-07-26 NOTE — PROCEDURES
CARDIAC CATH REPORT:   PROCEDURES PERFORMED:   Coronary Angiography    PHYSICIANS:  Attending Physician: Yang Mas MD  Interventional Cardiology Fellow: None  Cardiology Fellow: Yang Hyde MD    INDICATION:  Jaswant Chong is a 48 year old male with risk factors including hypertension, hyperlipidemia and diabetes who presented with chest pain found to have on stress test imaging possible hypokinesis at the apex and potential anterolateral perfusion defect.  He is referred for coronary angiogram.    DESCRIPTION:  1. Consent obtained with discussion of risks.  All questions were answered.  2. Sterile prep and procedure.  3. Location with Sheaths:   Lf Radial Arterial  6 Fr  10 cm [short]  4. Access: Local anesthetic with lidocaine.  A micropuncture 21 guage needle with ultrasound guidance was used to establish vascular access using a modified Seldinger technique.  5. Diagnostic Catheters:   6 Fr  JR4 and JL 4  6. Guiding Catheters:  None  6. Estimated blood loss: < 25 ml    MEDICATIONS:  The procedure was performed under conscious sedation for 11 minutes from 1626 to 1637.  The patient was assessed immediately before the first sedation medication was administered.  Midazolam 2 mg and Fentanyl 100 mcg were administered.  Heart rate, BP, respiration, oxygen saturation and patient responses were monitored throughout the procedure with the assistance of the RN under my supervision.  >> IA Nicardipine and IA NTG were administered to prophylax against radial artery spasm.  >> Antiplatelet Therapy: ASA 81 mg     Procedures:    CORONARY ANGIOGRAM:   1. Both coronary arteries arise from their respective cusps.  2. Dominance: Right  3. The LM is without angiographic evidence of disease.   4.  The LAD is a large vessel.  The LAD has minimal luminal irregularities in all segments.  The LAD has two large diagonal branches each with mild luminal irregularities.  5. The LCx is a large vessel with medium sized OM1 and OM2  vessels.  The LCx after the OM2 takeoff is a small vessel.  The LCx and associated vessels are angiographically without disease.  6.  The RCA is a large vessel that gives rise to the PDA and PL.  The RCA, RPDA, and RPLAs have mild luminal irregularities.    Sheath Removal:  The left radial artery sheath was manually removed in the cardiac catheterization laboratory.    Contrast: Isovue, 33 ml     Fluoroscopy Time: 1.2 min    COMPLICATIONS:  1. None    SUMMARY:   Minimal coronary artery disease without any significant blockages or lesions noted.    PLAN:   >> ASA 81 mg daily  >> Risk factor modification, blood pressure management per primary  >> Discharge today per protocol    The attending interventional cardiologist was present and supervised all critical aspects the procedure.    Findings discussed with patient at end of case.    See CVIS report for final draft.    Yang Hyde MD  Cardiology Fellow    Yang Mas MD  Cardiology Staff      ATTENDING ATTESTATION: I was present and supervised the cardiology fellow throughout the procedure and reviewed the findings with the fellow at the completion of the procedure.  I agree with the documentation above.    Yang Mas MD, PhD  Interventional/Critical Care Cardiology  [phone]    July 26, 2018

## 2018-07-26 NOTE — IP AVS SNAPSHOT
MRN:[id number]                      After Visit Summary   7/26/2018    Jaswant Chong    MRN: [id number]           Thank you!     Thank you for choosing [facility] for your care. Our goal is always to provide you with excellent care. Hearing back from our patients is one way we can continue to improve our services. Please take a few minutes to complete the written survey that you may receive in the mail after you visit with us. Thank you!        Patient Information     Date Of Birth          1970        About your hospital stay     You were admitted on:  July 26, 2018 You last received care in the:  Unit 6D Observation [facility]    You were discharged on:  July 27, 2018       Who to Call     For medical emergencies, please call 911.  For non-urgent questions about your medical care, please call your primary care provider or clinic, [phone]          Attending Provider     Provider Specialty    Mello Maurer, DO Cardiology    Yang Mas MD Cardiology       Primary Care Provider Office Phone # Fax #    Mik Kumari [phone]      After Care Instructions     Discharge Instructions - IF on Metformin (Glucophage or Glucovance) or Metformin containing medications       IF on Metformin (Glucophage or Glucovance) or Metformin containing medications , schedule a Basic Metabolic Panel at [facility] or Primary Clinic in 48 - 72 hours post procedure and PRIOR TO resuming the Metformin or Metformin containing medications.  Hold Metformin (Glucophage or Glucovance) or Metformin containing medications until after the Basic Metabolic Panel on the 2nd or 3rd day following the procedure.  May resume after blood draw is complete.                  Further instructions from your care team       cath  Going Home after an Angioplasty or Stent Placement (Cardiac)  ______________________________________________    Patient Name: Jaswant Chong  Date of Procedure: July 27,  2018      After you go home:    Have an adult stay with you for 24 hours.    Drink plenty of fluids.    You may eat your normal diet, unless your doctor tells you otherwise.    For 24 hours:    Relax and take it easy.    Do NOT smoke.    Do NOT make any important or legal decisions.    Do NOT drive or operate machines at home or at work.    Do NOT drink alcohol.    Remove the Band-Aid after 24 hours. If there is minor oozing, apply another Band-aid and remove it after 12 hours.    For 2 days, do NOT have sex or do any heavy exercise.    Do NOT take a bath, or use a hot tub or pool for at least 3 days. You may shower.      Care of wrist or arm site  It is normal to have soreness at the puncture site and mild tingling in your hand for up to 3 days.    For 2 days, do not use your hand or arm to support your weight (such as rising from a chair) or bend your wrist (such as lifting a garage door).    For 2 days, do not lift more than 5 pounds or exercise your arm (tennis, golf or bowling).    If you start bleeding from the site in your arm:    Sit down and press firmly on the site with your fingers for 10 minutes. Call your doctor as soon as you can.    If the bleeding stops, sit still and keep your wrist straight for 2 hours.    Medicines    If you have started taking Plavix or Effient, do not stop taking it until you talk to your heart doctor (cardiologist).    If you are on metformin (Glucophage), do not restart it until you have blood tests (within 2 to 3 days after discharge). When your doctor tells you it is safe, you may restart the metformin.    If you have stopped any other medicines, check with your nurse or provider about when to restart them.    Call 911 right away if you have bleeding that is heavy or does not stop.    Call your doctor if:    You have a large or growing hard lump around the site.    The site is red, swollen, hot or tender.    Blood or fluid is draining from the site.    You have chills or a  "fever greater than 101 F (38 C).    Your arm feels numb or cool.    You have hives, a rash or unusual itching.      [facility] [place]:  [phone] (7 days a week)      We will contact you tomorrow for follow-up. Number where we can reach you: ***      Pending Results     Date and Time Order Name Status Description    7/26/2018 1819 EKG 12-lead, tracing only Preliminary     7/26/2018 1253 EKG 12-lead, tracing only Preliminary             Admission Information     Date & Time Provider Department Dept. Phone    7/26/2018 Yang Mas MD Unit 6D Observation [facility] [phone]      Your Vitals Were     Blood Pressure Pulse Temperature Respirations Height Weight    138/104 (BP Location: Left arm) 98 98.3  F (36.8  C) (Oral) 16 1.905 m (6' 3\") 140 kg (308 lb 10.3 oz)    Pulse Oximetry BMI (Body Mass Index)                96% 38.58 kg/m2          [facility] Information     [facility] gives you secure access to your electronic health record. If you see a primary care provider, you can also send messages to your care team and make appointments. If you have questions, please call your primary care clinic.  If you do not have a primary care provider, please call [phone] and they will assist you.        Care EveryWhere ID     This is your Care EveryWhere ID. This could be used by other organizations to access your [place] medical records  [id number]        Equal Access to Services     JOY KEY AH: Hadii aad ku hadasho Soomaali, waaxda luqadaha, qaybta kaalmada adeegyada, waxay anderia moon adetalita roblero. So [facility] [phone].    ATENCIÓN: Si habla español, tiene a aden disposición servicios gratuitos de asistencia lingüística. Llame al [phone].    We comply with applicable federal civil rights laws and [place] laws. We do not discriminate on the basis of race, color, national origin, age, disability, sex, sexual orientation, or gender identity.             "   Review of your medicines      UNREVIEWED medicines. Ask your doctor about these medicines        Dose / Directions    amLODIPine 10 MG tablet   Commonly known as:  NORVASC   Used for:  HTN (hypertension), malignant        Dose:  10 mg   Take 1 tablet (10 mg) by mouth daily   Quantity:  90 tablet   Refills:  3       BASAGLAR 100 UNIT/ML injection   Used for:  Diabetes mellitus without complication (H)        Dose:  20 Units   Inject 20 Units Subcutaneous daily   Quantity:  30 mL   Refills:  3       insulin aspart 100 UNIT/ML injection   Commonly known as:  NovoLOG PEN        Dose:  5 Units   Inject 5 Units Subcutaneous 3 times daily (before meals)   Refills:  0       liraglutide 18 MG/3ML soln   Commonly known as:  VICTOZA PEN   Used for:  Uncontrolled type 2 diabetes mellitus without complication, without long-term current use of insulin (H)        Dose:  0.6 mg   Inject 0.6 mg Subcutaneous daily   Quantity:  9 mL   Refills:  3       lisinopril 40 MG tablet   Commonly known as:  PRINIVIL/ZESTRIL   Used for:  HTN (hypertension), malignant        Dose:  40 mg   Take 1 tablet (40 mg) by mouth daily   Quantity:  90 tablet   Refills:  3       metFORMIN 1000 MG tablet   Commonly known as:  GLUCOPHAGE        Dose:  1000 mg   Take 1,000 mg by mouth 2 times daily (with meals)   Refills:  0       oxyCODONE-acetaminophen 5-325 MG per tablet   Commonly known as:  PERCOCET   Used for:  Malignant neoplasm of appendix vermiformis (H)        Dose:  1 tablet   Take 1 tablet by mouth every 4 hours as needed   Quantity:  90 tablet   Refills:  0       pravastatin 20 MG tablet   Commonly known as:  PRAVACHOL   Used for:  Uncontrolled type 2 diabetes mellitus without complication, without long-term current use of insulin (H)        Dose:  20 mg   Take 1 tablet (20 mg) by mouth At Bedtime   Quantity:  90 tablet   Refills:  3       * traMADol 50 MG tablet   Commonly known as:  ULTRAM        Dose:  50 mg   Take 50 mg by mouth every 6  hours as needed   Refills:  0       * traMADol 50 MG tablet   Commonly known as:  ULTRAM   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 Read the directions carefully, and ask your doctor or other care provider to review them with you.      CONTINUE these medicines which have NOT CHANGED        Dose / Directions    * BD VELIA U/F 32G X 4 MM   Used for:  Uncontrolled type 2 diabetes mellitus without complication, without long-term current use of insulin (H)   Generic drug:  insulin pen needle        INJECTING THREE TIMES DAILY BEFORE MEALS   Quantity:  300 each   Refills:  3       * insulin pen needle 32G X 6 MM   Commonly known as:  NOVOFINE   Used for:  Uncontrolled type 2 diabetes mellitus without complication, with long-term current use of insulin (H)        Use 1 daily or as directed.   Quantity:  100 each   Refills:  prn       Blood Pressure Kit        Blood pressure machine   Refills:  0       * COOL BLOOD GLUCOSE TEST STRIPS test strip   Generic drug:  blood glucose monitoring        Dispense item covered by pt ins. E11.9 NIDDM type II - Test 3 times/day, Reason: Unstable diabetes   Refills:  0       * CONTOUR NEXT TEST test strip   Used for:  Uncontrolled type 2 diabetes mellitus without complication, without long-term current use of insulin (H)   Generic drug:  blood glucose monitoring        TESTING THREE TIMES DAILY   Quantity:  300 strip   Refills:  3       FIFTY50 GLUCOSE METER 2.0 w/Device Kit        3 times daily   Refills:  0       lancets 28G Misc        Dispense item covered by pt ins. E11.9 NIDDM type II - Test 3 times/day, Reason: Unstable diabetes   Refills:  0       * Notice:  This list has 4 medication(s) that are the same as other medications prescribed for you. Read the  directions carefully, and ask your doctor or other care provider to review them with you.             Protect others around you: Learn how to safely use, store and throw away your medicines at www.disposemymeds.org.             Medication List: This is a list of all your medications and when to take them. Check marks below indicate your daily home schedule. Keep this list as a reference.      Medications           Morning Afternoon Evening Bedtime As Needed    amLODIPine 10 MG tablet   Commonly known as:  NORVASC   Take 1 tablet (10 mg) by mouth daily                                BASAGLAR 100 UNIT/ML injection   Inject 20 Units Subcutaneous daily                                * BD VELIA U/F 32G X 4 MM   INJECTING THREE TIMES DAILY BEFORE MEALS   Generic drug:  insulin pen needle                                * insulin pen needle 32G X 6 MM   Commonly known as:  NOVOFINE   Use 1 daily or as directed.                                Blood Pressure Kit   Blood pressure machine                                * COOL BLOOD GLUCOSE TEST STRIPS test strip   Dispense item covered by pt ins. E11.9 NIDDM type II - Test 3 times/day, Reason: Unstable diabetes   Generic drug:  blood glucose monitoring                                * CONTOUR NEXT TEST test strip   TESTING THREE TIMES DAILY   Generic drug:  blood glucose monitoring                                FIFTY50 GLUCOSE METER 2.0 w/Device Kit   3 times daily                                insulin aspart 100 UNIT/ML injection   Commonly known as:  NovoLOG PEN   Inject 5 Units Subcutaneous 3 times daily (before meals)                                lancets 28G Misc   Dispense item covered by pt ins. E11.9 NIDDM type II - Test 3 times/day, Reason: Unstable diabetes                                liraglutide 18 MG/3ML soln   Commonly known as:  VICTOZA PEN   Inject 0.6 mg Subcutaneous daily                                lisinopril 40 MG tablet   Commonly known as:   PRINIVIL/ZESTRIL   Take 1 tablet (40 mg) by mouth daily                                metFORMIN 1000 MG tablet   Commonly known as:  GLUCOPHAGE   Take 1,000 mg by mouth 2 times daily (with meals)                                oxyCODONE-acetaminophen 5-325 MG per tablet   Commonly known as:  PERCOCET   Take 1 tablet by mouth every 4 hours as needed                                pravastatin 20 MG tablet   Commonly known as:  PRAVACHOL   Take 1 tablet (20 mg) by mouth At Bedtime                                * traMADol 50 MG tablet   Commonly known as:  ULTRAM   Take 50 mg by mouth every 6 hours as needed                                * traMADol 50 MG tablet   Commonly known as:  ULTRAM   Take 1 tablet (50 mg) by mouth 4 times daily                                * Notice:  This list has 6 medication(s) that are the same as other medications prescribed for you. Read the directions carefully, and ask your doctor or other care provider to review them with you.

## 2018-07-27 VITALS
TEMPERATURE: 98.3 F | WEIGHT: 308.64 LBS | SYSTOLIC BLOOD PRESSURE: 138 MMHG | BODY MASS INDEX: 38.38 KG/M2 | RESPIRATION RATE: 16 BRPM | OXYGEN SATURATION: 96 % | DIASTOLIC BLOOD PRESSURE: 104 MMHG | HEART RATE: 98 BPM | HEIGHT: 75 IN

## 2018-07-27 LAB
GLUCOSE BLDC GLUCOMTR-MCNC: 243 MG/DL (ref 70–99)
INTERPRETATION ECG - MUSE: NORMAL
INTERPRETATION ECG - MUSE: NORMAL

## 2018-07-27 PROCEDURE — 82962 GLUCOSE BLOOD TEST: CPT

## 2018-07-27 NOTE — PROGRESS NOTES
Pt AOx4, hypertensive (team notified and still considered ok for discharge), rt radial intervention site w/o hematoma and with good pulses. Site is warm, dry, intact. Pt denies nausea, pain at this time. Tolerated breakfast. Will continue to monitor.

## 2018-07-27 NOTE — DISCHARGE INSTRUCTIONS
cath  Going Home after an Angioplasty or Stent Placement (Cardiac)  ______________________________________________    Patient Name: Jaswant Chong  Date of Procedure: July 27, 2018      After you go home:    Have an adult stay with you for 24 hours.    Drink plenty of fluids.    You may eat your normal diet, unless your doctor tells you otherwise.    For 24 hours:    Relax and take it easy.    Do NOT smoke.    Do NOT make any important or legal decisions.    Do NOT drive or operate machines at home or at work.    Do NOT drink alcohol.    Remove the Band-Aid after 24 hours. If there is minor oozing, apply another Band-aid and remove it after 12 hours.    For 2 days, do NOT have sex or do any heavy exercise.    Do NOT take a bath, or use a hot tub or pool for at least 3 days. You may shower.      Care of wrist or arm site  It is normal to have soreness at the puncture site and mild tingling in your hand for up to 3 days.    For 2 days, do not use your hand or arm to support your weight (such as rising from a chair) or bend your wrist (such as lifting a garage door).    For 2 days, do not lift more than 5 pounds or exercise your arm (tennis, golf or bowling).    If you start bleeding from the site in your arm:    Sit down and press firmly on the site with your fingers for 10 minutes. Call your doctor as soon as you can.    If the bleeding stops, sit still and keep your wrist straight for 2 hours.    Medicines    If you have started taking Plavix or Effient, do not stop taking it until you talk to your heart doctor (cardiologist).    If you are on metformin (Glucophage), do not restart it until you have blood tests (within 2 to 3 days after discharge). When your doctor tells you it is safe, you may restart the metformin.    If you have stopped any other medicines, check with your nurse or provider about when to restart them.    Call 911 right away if you have bleeding that is heavy or does not stop.    Call your doctor  if:    You have a large or growing hard lump around the site.    The site is red, swollen, hot or tender.    Blood or fluid is draining from the site.    You have chills or a fever greater than 101 F (38 C).    Your arm feels numb or cool.    You have hives, a rash or unusual itching.      [facility] [place]:  [phone] (7 days a week)      We will contact you tomorrow for follow-up. Number where we can reach you: ***

## 2018-07-27 NOTE — DISCHARGE SUMMARY
Discharge instructions reviewed, understood, and signed by patient. VSS, PIV removed, medications reviewed and understood, patient has all belongings. Patient left unit with family. Pt left insulin pen and was notified after discharge-per pt ok to discard as he has more at home.

## 2018-07-27 NOTE — PROGRESS NOTES
Pt alert and oriented. Pt continued not to feel well through evening shift until 2300 or when he went to bed. Pt had headache and SOB. Vitals WNL. Pt received IV lasix x1 dose and had adequate output. Pt drank diet soda to see if that would help the headache. Pt tolerated eating well. Pt able to ambulate to restroom independently. Pt's left site CDI. No hematoma noted. Pt NSR in 70-80s. Able to make needs known. Call light within reach. Will continue to monitor.

## 2018-11-09 ENCOUNTER — TELEPHONE (OUTPATIENT)
Dept: FAMILY MEDICINE | Facility: OTHER | Age: 48
End: 2018-11-09

## 2018-11-09 NOTE — TELEPHONE ENCOUNTER
Enzo left me a message on my phone asking if you had any openings this afternoon for a medication refill for his percocet, he is in town today  Jailyn [facility] LPN on 11/9/2018 at 1:13 PM

## 2018-11-27 ENCOUNTER — TELEPHONE (OUTPATIENT)
Dept: FAMILY MEDICINE | Facility: OTHER | Age: 48
End: 2018-11-27

## 2018-11-27 ENCOUNTER — MYC MEDICAL ADVICE (OUTPATIENT)
Dept: FAMILY MEDICINE | Facility: OTHER | Age: 48
End: 2018-11-27

## 2018-11-30 ENCOUNTER — OFFICE VISIT (OUTPATIENT)
Dept: FAMILY MEDICINE | Facility: OTHER | Age: 48
End: 2018-11-30
Attending: FAMILY MEDICINE
Payer: COMMERCIAL

## 2018-11-30 ENCOUNTER — TELEPHONE (OUTPATIENT)
Dept: FAMILY MEDICINE | Facility: OTHER | Age: 48
End: 2018-11-30

## 2018-11-30 VITALS
DIASTOLIC BLOOD PRESSURE: 68 MMHG | BODY MASS INDEX: 36.7 KG/M2 | SYSTOLIC BLOOD PRESSURE: 138 MMHG | RESPIRATION RATE: 16 BRPM | WEIGHT: 293.6 LBS

## 2018-11-30 DIAGNOSIS — C18.1 MALIGNANT NEOPLASM OF APPENDIX VERMIFORMIS (H): ICD-10-CM

## 2018-11-30 DIAGNOSIS — I10 HTN (HYPERTENSION), MALIGNANT: ICD-10-CM

## 2018-11-30 LAB
ALBUMIN SERPL-MCNC: 4 G/DL (ref 3.5–5.7)
ALP SERPL-CCNC: 103 U/L (ref 34–104)
ALT SERPL W P-5'-P-CCNC: 33 U/L (ref 7–52)
ANION GAP SERPL CALCULATED.3IONS-SCNC: 9 MMOL/L (ref 3–14)
AST SERPL W P-5'-P-CCNC: 23 U/L (ref 13–39)
BILIRUB SERPL-MCNC: 0.6 MG/DL (ref 0.3–1)
BUN SERPL-MCNC: 15 MG/DL (ref 7–25)
CALCIUM SERPL-MCNC: 9.1 MG/DL (ref 8.6–10.3)
CHLORIDE SERPL-SCNC: 92 MMOL/L (ref 98–107)
CO2 SERPL-SCNC: 29 MMOL/L (ref 21–31)
CREAT SERPL-MCNC: 0.81 MG/DL (ref 0.7–1.3)
GFR SERPL CREATININE-BSD FRML MDRD: >90 ML/MIN/1.7M2
GLUCOSE SERPL-MCNC: 341 MG/DL (ref 70–105)
HBA1C MFR BLD: 13.9 % (ref 4–6)
POTASSIUM SERPL-SCNC: 3.8 MMOL/L (ref 3.5–5.1)
PROT SERPL-MCNC: 7.3 G/DL (ref 6.4–8.9)
SODIUM SERPL-SCNC: 130 MMOL/L (ref 134–144)

## 2018-11-30 PROCEDURE — 80053 COMPREHEN METABOLIC PANEL: CPT | Performed by: FAMILY MEDICINE

## 2018-11-30 PROCEDURE — G0463 HOSPITAL OUTPT CLINIC VISIT: HCPCS

## 2018-11-30 PROCEDURE — 80307 DRUG TEST PRSMV CHEM ANLYZR: CPT | Performed by: FAMILY MEDICINE

## 2018-11-30 PROCEDURE — 99214 OFFICE O/P EST MOD 30 MIN: CPT | Performed by: FAMILY MEDICINE

## 2018-11-30 PROCEDURE — 36415 COLL VENOUS BLD VENIPUNCTURE: CPT | Performed by: FAMILY MEDICINE

## 2018-11-30 PROCEDURE — 90686 IIV4 VACC NO PRSV 0.5 ML IM: CPT | Performed by: FAMILY MEDICINE

## 2018-11-30 PROCEDURE — 83036 HEMOGLOBIN GLYCOSYLATED A1C: CPT | Performed by: FAMILY MEDICINE

## 2018-11-30 PROCEDURE — 90471 IMMUNIZATION ADMIN: CPT

## 2018-11-30 PROCEDURE — G0463 HOSPITAL OUTPT CLINIC VISIT: HCPCS | Mod: 25

## 2018-11-30 RX ORDER — LIRAGLUTIDE 6 MG/ML
0.6 INJECTION SUBCUTANEOUS DAILY
Qty: 9 ML | Refills: 3 | Status: SHIPPED | OUTPATIENT
Start: 2018-11-30 | End: 2019-01-01

## 2018-11-30 RX ORDER — LISINOPRIL 40 MG/1
40 TABLET ORAL DAILY
Qty: 90 TABLET | Refills: 3 | Status: SHIPPED | OUTPATIENT
Start: 2018-11-30 | End: 2019-01-01

## 2018-11-30 RX ORDER — TRAMADOL HYDROCHLORIDE 50 MG/1
50 TABLET ORAL 4 TIMES DAILY
Qty: 90 TABLET | Refills: 3 | Status: SHIPPED | OUTPATIENT
Start: 2018-11-30 | End: 2019-01-01

## 2018-11-30 RX ORDER — OXYCODONE AND ACETAMINOPHEN 5; 325 MG/1; MG/1
1 TABLET ORAL EVERY 4 HOURS PRN
Qty: 90 TABLET | Refills: 0 | Status: SHIPPED | OUTPATIENT
Start: 2018-11-30 | End: 2019-03-04

## 2018-11-30 RX ORDER — INSULIN PUMP SYRINGE, 3 ML
EACH MISCELLANEOUS
Qty: 1 KIT | Refills: 3 | Status: SHIPPED | OUTPATIENT
Start: 2018-11-30 | End: 2020-12-17

## 2018-11-30 RX ORDER — PRAVASTATIN SODIUM 20 MG
20 TABLET ORAL AT BEDTIME
Qty: 90 TABLET | Refills: 3 | Status: SHIPPED | OUTPATIENT
Start: 2018-11-30 | End: 2019-01-01

## 2018-11-30 ASSESSMENT — ENCOUNTER SYMPTOMS
NERVOUS/ANXIOUS: 0
UNEXPECTED WEIGHT CHANGE: 0
DYSPHORIC MOOD: 0
ARTHRALGIAS: 1
ABDOMINAL PAIN: 1
FEVER: 0
CHILLS: 0
SHORTNESS OF BREATH: 0
BACK PAIN: 0

## 2018-11-30 ASSESSMENT — ANXIETY QUESTIONNAIRES
3. WORRYING TOO MUCH ABOUT DIFFERENT THINGS: NOT AT ALL
5. BEING SO RESTLESS THAT IT IS HARD TO SIT STILL: NOT AT ALL
GAD7 TOTAL SCORE: 0
6. BECOMING EASILY ANNOYED OR IRRITABLE: NOT AT ALL
1. FEELING NERVOUS, ANXIOUS, OR ON EDGE: NOT AT ALL
7. FEELING AFRAID AS IF SOMETHING AWFUL MIGHT HAPPEN: NOT AT ALL
2. NOT BEING ABLE TO STOP OR CONTROL WORRYING: NOT AT ALL
IF YOU CHECKED OFF ANY PROBLEMS ON THIS QUESTIONNAIRE, HOW DIFFICULT HAVE THESE PROBLEMS MADE IT FOR YOU TO DO YOUR WORK, TAKE CARE OF THINGS AT HOME, OR GET ALONG WITH OTHER PEOPLE: NOT DIFFICULT AT ALL

## 2018-11-30 ASSESSMENT — PAIN SCALES - GENERAL: PAINLEVEL: MODERATE PAIN (4)

## 2018-11-30 ASSESSMENT — PATIENT HEALTH QUESTIONNAIRE - PHQ9: 5. POOR APPETITE OR OVEREATING: NOT AT ALL

## 2018-11-30 NOTE — NURSING NOTE
"Coming in for a Diabetic check and medication refill    Chief Complaint   Patient presents with     Recheck Medication     check up       Initial /68 (BP Location: Right arm, Patient Position: Sitting, Cuff Size: Adult Large)  Resp 16  Wt 293 lb 9.6 oz (133.2 kg)  BMI 36.7 kg/m2 Estimated body mass index is 36.7 kg/(m^2) as calculated from the following:    Height as of 7/26/18: 6' 3\" (1.905 m).    Weight as of this encounter: 293 lb 9.6 oz (133.2 kg).  Medication Reconciliation: complete    Jailyn [facility] LPN    "

## 2018-11-30 NOTE — PROGRESS NOTES
Injectable Influenza Immunization Documentation    1.  Is the person to be vaccinated sick today?   No    2. Does the person to be vaccinated have an allergy to a component   of the vaccine?   No  Egg Allergy Algorithm Link    3. Has the person to be vaccinated ever had a serious reaction   to influenza vaccine in the past?   No    4. Has the person to be vaccinated ever had Guillain-Barré syndrome?   No    Form completed by Jailyn [facility] LPN on 11/30/2018 at 12:36 PM  VERIFIED

## 2018-11-30 NOTE — PROGRESS NOTES
SUBJECTIVE:   Jaswant Chong is a 48 year old male who presents to clinic today for the following health issues:    HPI  Follow up on his chronic pain, diabetes.  Has lost about 16#.  Trying to drink lots of water.  Has not been checking his sugars lately, machine broke.  Would like a replacement.  Over the fall he was walking up to 7 miles daily hunting.  Has not been using his insulin.  Ready to resume it.  Has victoza as well, needs a refill.    Has been out of his percocet since October.  Uses this about 1-2 daily at the most.  Uses it for his severe knee djd and the abdomen pain from his cancer treatment.    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     Priority: Medium     Recurrent ventral hernia 09/14/2015     Priority: Medium     Controlled substance agreement signed 08/13/2015     Priority: Medium     Major depressive disorder, recurrent episode, moderate (H) 08/13/2015     Priority: Medium     Degenerative joint disease (DJD) of hip 02/06/2015     Priority: Medium     Hernia 12/03/2012     Priority: Medium     Abdominal pain 02/16/2012     Priority: Medium     Diabetes mellitus without complication (H) 12/07/2011     Priority: Medium     Ventral hernia with  obstruction 12/06/2011     Priority: Medium     Varicocele 05/03/2011     Priority: Medium     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     EXCISE CYST GENERIC (LOCATION)      sebaceous, scalp     OTHER SURGICAL HISTORY      2007,GMG490,LYMPH NODE DISSECTION     OTHER SURGICAL HISTORY      03/08,,HERNIA REPAIR,Repair of surgical wound hernia, metastatic cancer incidentally noted.     OTHER SURGICAL HISTORY      04/08,83013.9,HX ABDOMEN PERITONEUM OMENTUM SURGERY,removal of left hemidiaphragm and omentum [Other] as well as intraperitoneal chemotherapy, 5 FU and Oxaliplatin for metastatic mucinous adenocarcinoma of the appendix.[[[     OTHER SURGICAL HISTORY      04/09,,HERNIA REPAIR, Ventral hernia repair, recurrent appendiceal carcinoma, resection of 7 centimeter left upper quadrant tumor with splenectomy, Surgeon, Dr. Marks     OTHER SURGICAL HISTORY      11/2001,,HERNIA REPAIR,Reduction of ventral hernia and mesh repair of hernia     OTHER SURGICAL HISTORY      12/2012,,HERNIA REPAIR,Removal mesh with infection 12/12     OTHER SURGICAL HISTORY      4/25/13,777497,OTHER,left thumb, Dr. Donald     Social History   Substance Use Topics     Smoking status: Never Smoker     Smokeless tobacco: Never Used     Alcohol use 0.0 oz/week      Comment: Alcoholic Drinks/day: social     Current Outpatient Prescriptions   Medication Sig Dispense Refill     BASAGLAR 100 UNIT/ML injection Inject 20 Units Subcutaneous daily 30 mL 3     BD VELIA U/F 32G X 4 MM insulin pen needle INJECTING THREE TIMES DAILY BEFORE MEALS 300 each 3     blood glucose monitoring (CONTOUR NEXT TEST) test strip Use to test blood sugar 2 times daily or as  directed. 300 strip 3     blood glucose monitoring (COOL BLOOD GLUCOSE TEST STRIPS) test strip Use to test blood sugar 2 times daily or as directed. 200 each 3     Blood Glucose Monitoring Suppl (FIFTY50 GLUCOSE METER 2.0) w/Device KIT Check twice daily 1 kit 3     Blood Pressure KIT Blood pressure machine       insulin aspart (NOVOLOG PEN) 100 UNIT/ML injection Inject 5 Units Subcutaneous 3 times daily (before meals)       insulin pen needle (NOVOFINE) 32G X 6 MM Use 1 daily or as directed. 100 each prn     lancets 28G MISC Dispense item covered by pt ins. E11.9 NIDDM type II - Test 3 times/day, Reason: Unstable diabetes       liraglutide (VICTOZA PEN) 18 MG/3ML soln Inject 0.6 mg Subcutaneous daily 9 mL 3     lisinopril (PRINIVIL/ZESTRIL) 40 MG tablet Take 1 tablet (40 mg) by mouth daily 90 tablet 3     metFORMIN (GLUCOPHAGE) 1000 MG tablet Take 1,000 mg by mouth 2 times daily (with meals)       oxyCODONE-acetaminophen (PERCOCET) 5-325 MG tablet Take 1 tablet by mouth every 4 hours as needed 90 tablet 0     pravastatin (PRAVACHOL) 20 MG tablet Take 1 tablet (20 mg) by mouth At Bedtime 90 tablet 3     traMADol (ULTRAM) 50 MG tablet Take 1 tablet (50 mg) by mouth 4 times daily 90 tablet 3     [DISCONTINUED] lisinopril (PRINIVIL/ZESTRIL) 40 MG tablet Take 1 tablet (40 mg) by mouth daily 90 tablet 3     [DISCONTINUED] pravastatin (PRAVACHOL) 20 MG tablet Take 1 tablet (20 mg) by mouth At Bedtime 90 tablet 3     No Known Allergies    Review of Systems   Constitutional: Negative for chills, fever and unexpected weight change.   Respiratory: Negative for shortness of breath.    Gastrointestinal: Positive for abdominal pain.   Musculoskeletal: Positive for arthralgias. Negative for back pain.   Psychiatric/Behavioral: Negative for dysphoric mood. The patient is not nervous/anxious.         OBJECTIVE:     /68 (BP Location: Right arm, Patient Position: Sitting, Cuff Size: Adult Large)  Resp 16  Wt 293 lb 9.6 oz  (133.2 kg)  BMI 36.7 kg/m2  Body mass index is 36.7 kg/(m^2).  Physical Exam   Constitutional: He appears well-developed. No distress.   Cardiovascular: Normal rate and normal heart sounds.  Exam reveals no gallop and no friction rub.    No murmur heard.  Pulmonary/Chest: Effort normal. No respiratory distress. He has no wheezes. He has no rales.   Abdominal:   Soft, with very large non reducible ventral hernia.   Skin: He is not diaphoretic.     Sensory exam of the foot is abnormal, tested with the monofilament. Good pulses, no lesions or ulcers, good peripheral pulses.          ASSESSMENT/PLAN:         (E11.65) Uncontrolled type 2 diabetes mellitus without complication, without long-term current use of insulin (H)  (primary encounter diagnosis)  Comment: improving in some regards, he is making some changes and now ready to resume more of his meds.    Plan: Blood Glucose Monitoring Suppl (FIFTY50 GLUCOSE        METER 2.0) w/Device KIT, blood glucose         monitoring (COOL BLOOD GLUCOSE TEST STRIPS)         test strip, blood glucose monitoring (CONTOUR         NEXT TEST) test strip, pravastatin (PRAVACHOL)         20 MG tablet, Hemoglobin A1c, Comprehensive         Metabolic Panel        Follow up in 3 months    (C18.1) Malignant neoplasm of appendix vermiformis (H)  Comment: stable  Plan: traMADol (ULTRAM) 50 MG tablet,         oxyCODONE-acetaminophen (PERCOCET) 5-325 MG         tablet, Drug  Screen Comprehensive , Urine with        Reported Meds (MedTox) (Pain Care Package)        Refilled meds.    (I10) HTN (hypertension), malignant  Comment: stable  Plan: lisinopril (PRINIVIL/ZESTRIL) 40 MG tablet         Refilled this.        Mik Kumari MD  [facility]    Results for orders placed or performed in visit on 11/30/18   Hemoglobin A1c   Result Value Ref Range    Hemoglobin A1C 13.9 (H) 4.0 - 6.0 %   Comprehensive Metabolic Panel   Result Value Ref Range    Sodium 130 (L) 134 - 144 mmol/L     Potassium 3.8 3.5 - 5.1 mmol/L    Chloride 92 (L) 98 - 107 mmol/L    Carbon Dioxide 29 21 - 31 mmol/L    Anion Gap 9 3 - 14 mmol/L    Glucose 341 (H) 70 - 105 mg/dL    Urea Nitrogen 15 7 - 25 mg/dL    Creatinine 0.81 0.70 - 1.30 mg/dL    GFR Estimate >90 >60 mL/min/1.7m2    GFR Estimate If Black >90 >60 mL/min/1.7m2    Calcium 9.1 8.6 - 10.3 mg/dL    Bilirubin Total 0.6 0.3 - 1.0 mg/dL    Albumin 4.0 3.5 - 5.7 g/dL    Protein Total 7.3 6.4 - 8.9 g/dL    Alkaline Phosphatase 103 34 - 104 U/L    ALT 33 7 - 52 U/L    AST 23 13 - 39 U/L     Given high A1c, I asked him to resume all of his previous meds, metformin, insulin and Victoza.  Diet and exercise along will not do much to normalize his glucose.      Mik Kumari MD on 12/3/2018 at 8:05 AM

## 2018-11-30 NOTE — MR AVS SNAPSHOT
After Visit Summary   11/30/2018    Jaswant Chong    MRN: [id number]           Patient Information     Date Of Birth          1970        Visit Information        Provider Department      11/30/2018 9:45 AM Mik Kumari MD [facility]        Today's Diagnoses     Uncontrolled type 2 diabetes mellitus without complication, without long-term current use of insulin (H)    -  1    Malignant neoplasm of appendix vermiformis (H)        HTN (hypertension), malignant           Follow-ups after your visit        Follow-up notes from your care team     Return in about 3 months (around 2/28/2019).      Future tests that were ordered for you today     Open Future Orders        Priority Expected Expires Ordered    Hemoglobin A1c Routine  11/30/2019 11/30/2018    Comprehensive Metabolic Panel Routine  11/30/2019 11/30/2018            Who to contact     If you have questions or need follow up information about today's clinic visit or your schedule please contact [facility] directly at [phone].  Normal or non-critical lab and imaging results will be communicated to you by Sign[employer]nehart, letter or phone within 4 business days after the clinic has received the results. If you do not hear from us within 7 days, please contact the clinic through [employer] or phone. If you have a critical or abnormal lab result, we will notify you by phone as soon as possible.  Submit refill requests through [employer] or call your pharmacy and they will forward the refill request to us. Please allow 3 business days for your refill to be completed.          Additional Information About Your Visit        Signadynehart Information     [employer] gives you secure access to your electronic health record. If you see a primary care provider, you can also send messages to your care team and make appointments. If you have questions, please call your primary care clinic.  If you do not have a primary care  provider, please call [phone] and they will assist you.        Care EveryWhere ID     This is your Care EveryWhere ID. This could be used by other organizations to access your [place] medical records  [id number]        Your Vitals Were     Respirations BMI (Body Mass Index)                16 36.7 kg/m2           Blood Pressure from Last 3 Encounters:   11/30/18 138/68   07/27/18 (!) 138/104   07/23/18 142/80    Weight from Last 3 Encounters:   11/30/18 293 lb 9.6 oz (133.2 kg)   07/26/18 308 lb 10.3 oz (140 kg)   07/23/18 307 lb (139.3 kg)              We Performed the Following     Drug  Screen Comprehensive , Urine with Reported Meds (MedTox) (Pain Care Package)          Today's Medication Changes          These changes are accurate as of 11/30/18 10:14 AM.  If you have any questions, ask your nurse or doctor.               These medicines have changed or have updated prescriptions.        Dose/Directions    * blood glucose monitoring test strip   Commonly known as:  COOL BLOOD GLUCOSE TEST STRIPS   This may have changed:  See the new instructions.   Used for:  Uncontrolled type 2 diabetes mellitus without complication, without long-term current use of insulin (H)   Changed by:  Mik Kumari MD        Use to test blood sugar 2 times daily or as directed.   Quantity:  200 each   Refills:  3       * blood glucose monitoring test strip   Commonly known as:  CONTOUR NEXT TEST   This may have changed:  See the new instructions.   Used for:  Uncontrolled type 2 diabetes mellitus without complication, without long-term current use of insulin (H)   Changed by:  Mik Kumari MD        Use to test blood sugar 2 times daily or as directed.   Quantity:  300 strip   Refills:  3       FIFTY50 GLUCOSE METER 2.0 w/Device Kit   This may have changed:    - when to take this  - additional instructions   Used for:  Uncontrolled type 2 diabetes mellitus without complication, without long-term current use of insulin (H)    Changed by:  Mik Kumari MD        Check twice daily   Quantity:  1 kit   Refills:  3       * Notice:  This list has 2 medication(s) that are the same as other medications prescribed for you. Read the directions carefully, and ask your doctor or other care provider to review them with you.         Where to get your medicines      These medications were sent to [id number] - [place], [place] - [address], [facility] [address]     Phone:  [phone]     blood glucose monitoring test strip    blood glucose monitoring test strip    FIFTY50 GLUCOSE METER 2.0 w/Device Kit    insulin aspart 100 UNIT/ML pen    liraglutide 18 MG/3ML solution    lisinopril 40 MG tablet    pravastatin 20 MG tablet         Some of these will need a paper prescription and others can be bought over the counter.  Ask your nurse if you have questions.     Bring a paper prescription for each of these medications     oxyCODONE-acetaminophen 5-325 MG tablet    traMADol 50 MG tablet               Information about OPIOIDS     PRESCRIPTION OPIOIDS: WHAT YOU NEED TO KNOW   We gave you an opioid (narcotic) pain medicine. It is important to manage your pain, but opioids are not always the best choice. You should first try all the other options your care team gave you. Take this medicine for as short a time (and as few doses) as possible.    Some activities can increase your pain, such as bandage changes or therapy sessions. It may help to take your pain medicine 30 to 60 minutes before these activities. Reduce your stress by getting enough sleep, working on hobbies you enjoy and practicing relaxation or meditation. Talk to your care team about ways to manage your pain beyond prescription opioids.    These medicines have risks:    DO NOT drive when on new or higher doses of pain medicine. These medicines can affect your alertness and reaction times, and you could be arrested for driving  under the influence (DUI). If you need to use opioids long-term, talk to your care team about driving.    DO NOT operate heavy machinery    DO NOT do any other dangerous activities while taking these medicines.    DO NOT drink any alcohol while taking these medicines.     If the opioid prescribed includes acetaminophen, DO NOT take with any other medicines that contain acetaminophen. Read all labels carefully. Look for the word  acetaminophen  or  Tylenol.  Ask your pharmacist if you have questions or are unsure.    You can get addicted to pain medicines, especially if you have a history of addiction (chemical, alcohol or substance dependence). Talk to your care team about ways to reduce this risk.    All opioids tend to cause constipation. Drink plenty of water and eat foods that have a lot of fiber, such as fruits, vegetables, prune juice, apple juice and high-fiber cereal. Take a laxative (Miralax, milk of magnesia, Colace, Senna) if you don t move your bowels at least every other day. Other side effects include upset stomach, sleepiness, dizziness, throwing up, tolerance (needing more of the medicine to have the same effect), physical dependence and slowed breathing.    Store your pills in a secure place, locked if possible. We will not replace any lost or stolen medicine. If you don t finish your medicine, please throw away (dispose) as directed by your pharmacist. The [place] [employer]ution [employer] has more information about safe disposal: [email or web address]-[email or web address]         Primary Care Provider Office Phone # Fax #    Mik Kumari [phone]       [address] [facility] [address]        Equal Access to Services     [facility] : Hadii jones briscoe Sodaisy, waaxda luqadaha, qaybta kaalyvonne reyes . [facility] [phone].    ATENCIÓN: Si habla español, tiene a aden disposición servicios  nicholas de asistencia lingüística. Vincent swartz [phone].    We comply with applicable federal civil rights laws and [place] laws. We do not discriminate on the basis of race, color, national origin, age, disability, sex, sexual orientation, or gender identity.            Thank you!     Thank you for choosing [facility]  for your care. Our goal is always to provide you with excellent care. Hearing back from our patients is one way we can continue to improve our services. Please take a few minutes to complete the written survey that you may receive in the mail after your visit with us. Thank you!             Your Updated Medication List - Protect others around you: Learn how to safely use, store and throw away your medicines at www.disposemymeds.org.          This list is accurate as of 11/30/18 10:14 AM.  Always use your most recent med list.                   Brand Name Dispense Instructions for use Diagnosis    * BD VELIA U/F 32G X 4 MM miscellaneous   Generic drug:  insulin pen needle     300 each    INJECTING THREE TIMES DAILY BEFORE MEALS    Uncontrolled type 2 diabetes mellitus without complication, without long-term current use of insulin (H)       * insulin pen needle 32G X 6 MM miscellaneous    NOVOFINE    100 each    Use 1 daily or as directed.    Uncontrolled type 2 diabetes mellitus without complication, with long-term current use of insulin (H)       * blood glucose monitoring test strip    COOL BLOOD GLUCOSE TEST STRIPS    200 each    Use to test blood sugar 2 times daily or as directed.    Uncontrolled type 2 diabetes mellitus without complication, without long-term current use of insulin (H)       * blood glucose monitoring test strip    CONTOUR NEXT TEST    300 strip    Use to test blood sugar 2 times daily or as directed.    Uncontrolled type 2 diabetes mellitus without complication, without long-term current use of insulin (H)       Blood Pressure Kit      Blood pressure  machine        FIFTY50 GLUCOSE METER 2.0 w/Device Kit     1 kit    Check twice daily    Uncontrolled type 2 diabetes mellitus without complication, without long-term current use of insulin (H)       insulin aspart 100 UNIT/ML pen    NovoLOG PEN    15 mL    Inject 5 Units Subcutaneous 3 times daily (before meals)    Uncontrolled type 2 diabetes mellitus without complication, without long-term current use of insulin (H)       insulin glargine 100 UNIT/ML pen     30 mL    Inject 20 Units Subcutaneous daily    Diabetes mellitus without complication (H)       lancets 28G Misc      Dispense item covered by pt ins. E11.9 NIDDM type II - Test 3 times/day, Reason: Unstable diabetes        liraglutide 18 MG/3ML solution    VICTOZA PEN    9 mL    Inject 0.6 mg Subcutaneous daily    Uncontrolled type 2 diabetes mellitus without complication, without long-term current use of insulin (H)       lisinopril 40 MG tablet    PRINIVIL/ZESTRIL    90 tablet    Take 1 tablet (40 mg) by mouth daily    HTN (hypertension), malignant       metFORMIN 1000 MG tablet    GLUCOPHAGE     Take 1,000 mg by mouth 2 times daily (with meals)        oxyCODONE-acetaminophen 5-325 MG tablet    PERCOCET    90 tablet    Take 1 tablet by mouth every 4 hours as needed    Malignant neoplasm of appendix vermiformis (H)       pravastatin 20 MG tablet    PRAVACHOL    90 tablet    Take 1 tablet (20 mg) by mouth At Bedtime    Uncontrolled type 2 diabetes mellitus without complication, without long-term current use of insulin (H)       traMADol 50 MG tablet    ULTRAM    90 tablet    Take 1 tablet (50 mg) by mouth 4 times daily    Malignant neoplasm of appendix vermiformis (H)       * Notice:  This list has 4 medication(s) that are the same as other medications prescribed for you. Read the directions carefully, and ask your doctor or other care provider to review them with you.

## 2018-12-01 ASSESSMENT — ANXIETY QUESTIONNAIRES: GAD7 TOTAL SCORE: 0

## 2018-12-05 ENCOUNTER — TELEPHONE (OUTPATIENT)
Dept: FAMILY MEDICINE | Facility: OTHER | Age: 48
End: 2018-12-05

## 2018-12-05 NOTE — TELEPHONE ENCOUNTER
Double check on this, as I was told the humalog is covered instead of the novolog I had written for.  Please make sure I am getting the correct information before I send in a 3rd prescription.  Mik Kumari MD on 12/5/2018 at 10:54 AM

## 2018-12-05 NOTE — TELEPHONE ENCOUNTER
Received denial from [facility] for Humalog Kwikpen because this is non-formulary. According to denial Admelog SoloStar Pen or vial must be tried and failed before Humalog will be approves. Denial is being faxed to nurse.  Nancy Venegas on 12/5/2018 at 10:50 AM

## 2018-12-06 LAB — PAIN DRUG SCR UR W RPTD MEDS: NORMAL

## 2018-12-10 ENCOUNTER — TELEPHONE (OUTPATIENT)
Dept: FAMILY MEDICINE | Facility: OTHER | Age: 48
End: 2018-12-10

## 2018-12-10 NOTE — TELEPHONE ENCOUNTER
Called and talked to pharmacy and states they will fill for the Admolog like last time, being that is what his insurance will cover.  Vinnie Anderson LPN ..............12/10/2018 11:30 AM

## 2018-12-10 NOTE — TELEPHONE ENCOUNTER
Received questionnaire from [facility] to be completed to approve Humalog kwikpen. Will fax to [phone].  Nancy Venegas on 12/10/2018 at 10:46 AM

## 2018-12-10 NOTE — TELEPHONE ENCOUNTER
No, I do not want a humalog pen.  The generic for humalog (Apagen? Spelling?) but very time I type this word into our HER, it changes to Humalog in the orders.  Please see my last prescription sent out, I hand typed in the other name as well.  Mik Kumari MD on 12/10/2018 at 11:02 AM

## 2019-01-01 ENCOUNTER — MYC REFILL (OUTPATIENT)
Dept: FAMILY MEDICINE | Facility: OTHER | Age: 49
End: 2019-01-01

## 2019-01-01 DIAGNOSIS — C18.1 MALIGNANT NEOPLASM OF APPENDIX VERMIFORMIS (H): ICD-10-CM

## 2019-01-01 DIAGNOSIS — I10 HTN (HYPERTENSION), MALIGNANT: ICD-10-CM

## 2019-01-01 RX ORDER — OXYCODONE AND ACETAMINOPHEN 5; 325 MG/1; MG/1
1 TABLET ORAL EVERY 4 HOURS PRN
Qty: 90 TABLET | Refills: 0 | Status: CANCELLED | OUTPATIENT
Start: 2019-01-01

## 2019-01-02 RX ORDER — LISINOPRIL 40 MG/1
40 TABLET ORAL DAILY
Qty: 90 TABLET | Refills: 3 | Status: SHIPPED | OUTPATIENT
Start: 2019-01-02 | End: 2019-11-08

## 2019-01-02 RX ORDER — LIRAGLUTIDE 6 MG/ML
0.6 INJECTION SUBCUTANEOUS DAILY
Qty: 9 ML | Refills: 3 | Status: SHIPPED | OUTPATIENT
Start: 2019-01-02 | End: 2020-04-09

## 2019-01-02 RX ORDER — TRAMADOL HYDROCHLORIDE 50 MG/1
50 TABLET ORAL 4 TIMES DAILY
Qty: 90 TABLET | Refills: 3 | Status: SHIPPED | OUTPATIENT
Start: 2019-01-02 | End: 2019-03-04

## 2019-01-02 RX ORDER — PRAVASTATIN SODIUM 20 MG
20 TABLET ORAL AT BEDTIME
Qty: 90 TABLET | Refills: 3 | Status: SHIPPED | OUTPATIENT
Start: 2019-01-02 | End: 2020-01-02 | Stop reason: ALTCHOICE

## 2019-01-31 ENCOUNTER — MYC MEDICAL ADVICE (OUTPATIENT)
Dept: FAMILY MEDICINE | Facility: OTHER | Age: 49
End: 2019-01-31

## 2019-03-04 ENCOUNTER — OFFICE VISIT (OUTPATIENT)
Dept: FAMILY MEDICINE | Facility: OTHER | Age: 49
End: 2019-03-04
Attending: FAMILY MEDICINE
Payer: COMMERCIAL

## 2019-03-04 VITALS
DIASTOLIC BLOOD PRESSURE: 68 MMHG | TEMPERATURE: 98.6 F | WEIGHT: 305.1 LBS | SYSTOLIC BLOOD PRESSURE: 138 MMHG | HEART RATE: 64 BPM | RESPIRATION RATE: 16 BRPM | BODY MASS INDEX: 38.13 KG/M2

## 2019-03-04 DIAGNOSIS — E11.9 DIABETES MELLITUS WITHOUT COMPLICATION (H): ICD-10-CM

## 2019-03-04 DIAGNOSIS — C18.1 MALIGNANT NEOPLASM OF APPENDIX VERMIFORMIS (H): ICD-10-CM

## 2019-03-04 LAB — HBA1C MFR BLD: 12.1 % (ref 4–6)

## 2019-03-04 PROCEDURE — 36415 COLL VENOUS BLD VENIPUNCTURE: CPT | Performed by: FAMILY MEDICINE

## 2019-03-04 PROCEDURE — 83036 HEMOGLOBIN GLYCOSYLATED A1C: CPT | Performed by: FAMILY MEDICINE

## 2019-03-04 PROCEDURE — 99214 OFFICE O/P EST MOD 30 MIN: CPT | Performed by: FAMILY MEDICINE

## 2019-03-04 RX ORDER — TRAMADOL HYDROCHLORIDE 50 MG/1
50 TABLET ORAL 4 TIMES DAILY
Qty: 90 TABLET | Refills: 3 | Status: SHIPPED | OUTPATIENT
Start: 2019-03-04 | End: 2019-06-25

## 2019-03-04 RX ORDER — OXYCODONE AND ACETAMINOPHEN 5; 325 MG/1; MG/1
1 TABLET ORAL EVERY 4 HOURS PRN
Qty: 90 TABLET | Refills: 0 | Status: SHIPPED | OUTPATIENT
Start: 2019-03-04 | End: 2019-06-25

## 2019-03-04 RX ORDER — INSULIN GLARGINE 100 [IU]/ML
20 INJECTION, SOLUTION SUBCUTANEOUS DAILY
Qty: 30 ML | Refills: 3 | Status: SHIPPED | OUTPATIENT
Start: 2019-03-04 | End: 2019-11-08

## 2019-03-04 ASSESSMENT — ENCOUNTER SYMPTOMS
SLEEP DISTURBANCE: 0
ARTHRALGIAS: 1
SHORTNESS OF BREATH: 0
UNEXPECTED WEIGHT CHANGE: 0
FATIGUE: 0
FEVER: 0
BACK PAIN: 1

## 2019-03-04 ASSESSMENT — ANXIETY QUESTIONNAIRES
2. NOT BEING ABLE TO STOP OR CONTROL WORRYING: NOT AT ALL
IF YOU CHECKED OFF ANY PROBLEMS ON THIS QUESTIONNAIRE, HOW DIFFICULT HAVE THESE PROBLEMS MADE IT FOR YOU TO DO YOUR WORK, TAKE CARE OF THINGS AT HOME, OR GET ALONG WITH OTHER PEOPLE: NOT DIFFICULT AT ALL
5. BEING SO RESTLESS THAT IT IS HARD TO SIT STILL: NOT AT ALL
6. BECOMING EASILY ANNOYED OR IRRITABLE: NOT AT ALL
1. FEELING NERVOUS, ANXIOUS, OR ON EDGE: NOT AT ALL
3. WORRYING TOO MUCH ABOUT DIFFERENT THINGS: NOT AT ALL
7. FEELING AFRAID AS IF SOMETHING AWFUL MIGHT HAPPEN: NOT AT ALL
GAD7 TOTAL SCORE: 0

## 2019-03-04 ASSESSMENT — PAIN SCALES - GENERAL: PAINLEVEL: SEVERE PAIN (6)

## 2019-03-04 ASSESSMENT — PATIENT HEALTH QUESTIONNAIRE - PHQ9: 5. POOR APPETITE OR OVEREATING: NOT AT ALL

## 2019-03-04 NOTE — NURSING NOTE
"Coming in for a medication check up    Chief Complaint   Patient presents with     Recheck Medication     check up       Initial /68 (BP Location: Right arm, Patient Position: Sitting, Cuff Size: Adult Large)   Pulse 64   Temp 98.6  F (37  C) (Tympanic)   Resp 16   Wt 138.4 kg (305 lb 1.6 oz)   BMI 38.13 kg/m   Estimated body mass index is 38.13 kg/m  as calculated from the following:    Height as of 7/26/18: 1.905 m (6' 3\").    Weight as of this encounter: 138.4 kg (305 lb 1.6 oz).  Medication Reconciliation: complete    Jailyn Baker LPN  "

## 2019-03-04 NOTE — PROGRESS NOTES
SUBJECTIVE:   Jaswant Chong is a 48 year old male who presents to clinic today for the following health issues:    HPI  Med refill.  He is leaving until 4/22/19 for [place].  Has a new job as a  for a shotgun .  Has been exercising a little.  Hard time with clod weather and is up about 5 # or so over the last few months.  Not checking his sugars.  In Jan had lost his insurance and had to reapply.  Has been off nearly all of this meds, apart from metformin.      Uses the percocet only 1-2 times a week or so.  Will do a bit more when hunting in [place].  Feels 90 tabs will last at least 2 months or more.   Uses this for back a well as significant knee pain from djd.  Tox screen in 11/18 was as expected.     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     Priority: Medium     Recurrent ventral hernia 09/14/2015     Priority: Medium     Controlled substance agreement signed 08/13/2015     Priority: Medium     Major depressive disorder, recurrent episode, moderate (H) 08/13/2015     Priority: Medium     Degenerative joint disease (DJD) of hip 02/06/2015     Priority: Medium     Hernia 12/03/2012     Priority: Medium     Abdominal pain  02/16/2012     Priority: Medium     Diabetes mellitus without complication (H) 12/07/2011     Priority: Medium     Ventral hernia with obstruction 12/06/2011     Priority: Medium     Varicocele 05/03/2011     Priority: Medium     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     EXCISE CYST GENERIC (LOCATION)      sebaceous, scalp     OTHER SURGICAL HISTORY      2007,[id number],LYMPH NODE DISSECTION     OTHER SURGICAL HISTORY      03/08,,HERNIA REPAIR,Repair of surgical wound hernia, metastatic cancer incidentally noted.     OTHER SURGICAL HISTORY      04/08,90194.9,HX ABDOMEN PERITONEUM OMENTUM SURGERY,removal of left hemidiaphragm and omentum [Other] as well as intraperitoneal chemotherapy, 5 FU and Oxaliplatin for metastatic mucinous adenocarcinoma of the appendix.[[[     OTHER SURGICAL HISTORY      04/09,,HERNIA REPAIR, Ventral hernia repair, recurrent appendiceal carcinoma, resection of 7 centimeter left upper quadrant tumor with splenectomy, Surgeon, Dr. Marks     OTHER SURGICAL HISTORY      11/2001,,HERNIA REPAIR,Reduction of ventral hernia and mesh repair of hernia     OTHER SURGICAL HISTORY      12/2012,,HERNIA REPAIR,Removal mesh with infection 12/12     OTHER SURGICAL HISTORY      4/25/13,[id number],OTHER,left thumb, Dr. Donald     Social History     Tobacco Use     Smoking status: Never Smoker     Smokeless tobacco: Never Used   Substance Use Topics     Alcohol use: Yes     Alcohol/week: 0.0 oz     Comment: Alcoholic Drinks/day: social     Current Outpatient Medications   Medication Sig Dispense Refill     BASAGLAR 100 UNIT/ML injection Inject 20 Units Subcutaneous daily 30 mL 3     BD VELIA U/F 32G X 4 MM insulin  pen needle INJECTING THREE TIMES DAILY BEFORE MEALS 300 each 3     blood glucose monitoring (CONTOUR NEXT TEST) test strip Use to test blood sugar 2 times daily or as directed. 300 strip 3     blood glucose monitoring (COOL BLOOD GLUCOSE TEST STRIPS) test strip Use to test blood sugar 2 times daily or as directed. 200 each 3     Blood Glucose Monitoring Suppl (FIFTY50 GLUCOSE METER 2.0) w/Device KIT Check twice daily 1 kit 3     Blood Pressure KIT Blood pressure machine       insulin lispro (HUMALOG PEN) 100 UNIT/ML pen Inject 5 Units Subcutaneous 3 times daily (before meals) May substitute Admelog 15 mL 3     insulin lispro (HUMALOG PEN) 100 UNIT/ML pen Inject 5 Units Subcutaneous 3 times daily (before meals) 15 mL 3     insulin pen needle (NOVOFINE) 32G X 6 MM Use 1 daily or as directed. 100 each prn     lancets 28G MISC Dispense item covered by pt ins. E11.9 NIDDM type II - Test 3 times/day, Reason: Unstable diabetes       liraglutide (VICTOZA PEN) 18 MG/3ML solution Inject 0.6 mg Subcutaneous daily 9 mL 3     lisinopril (PRINIVIL/ZESTRIL) 40 MG tablet Take 1 tablet (40 mg) by mouth daily 90 tablet 3     metFORMIN (GLUCOPHAGE) 1000 MG tablet Take 1,000 mg by mouth 2 times daily (with meals)       oxyCODONE-acetaminophen (PERCOCET) 5-325 MG tablet Take 1 tablet by mouth every 4 hours as needed 90 tablet 0     pravastatin (PRAVACHOL) 20 MG tablet Take 1 tablet (20 mg) by mouth At Bedtime 90 tablet 3     traMADol (ULTRAM) 50 MG tablet Take 1 tablet (50 mg) by mouth 4 times daily 90 tablet 3     No Known Allergies    Review of Systems   Constitutional: Negative for fatigue, fever and unexpected weight change.   Respiratory: Negative for shortness of breath.    Cardiovascular: Negative for chest pain.   Musculoskeletal: Positive for arthralgias and back pain.   Psychiatric/Behavioral: Negative for sleep disturbance.        OBJECTIVE:     /68 (BP Location: Right arm, Patient Position: Sitting, Cuff Size: Adult  Large)   Pulse 64   Temp 98.6  F (37  C) (Tympanic)   Resp 16   Wt 138.4 kg (305 lb 1.6 oz)   BMI 38.13 kg/m    Body mass index is 38.13 kg/m .  Physical Exam   Constitutional: He is oriented to person, place, and time. He appears well-developed. No distress.   Cardiovascular: Normal rate, regular rhythm and normal heart sounds.   Pulmonary/Chest: Effort normal and breath sounds normal. No stridor. No respiratory distress. He has no wheezes.   Neurological: He is alert and oriented to person, place, and time.   Skin: Skin is warm and dry. He is not diaphoretic.   Psychiatric: He has a normal mood and affect. His behavior is normal.     Sensory exam of the foot is normal, tested with the monofilament. Good pulses, no lesions or ulcers, good peripheral pulses.          ASSESSMENT/PLAN:         (E11.65) Uncontrolled diabetes mellitus type 2 without complications (H)  (primary encounter diagnosis)  Comment: due to no insurance, A1c is more of a new baseline.  Will resume the meds and follow up in 3 months.  Consider adding back on the victoza then.  Plan: insulin lispro (HUMALOG PEN) 100 UNIT/ML pen,         metFORMIN (GLUCOPHAGE) 1000 MG tablet             (C18.1) Malignant neoplasm of appendix vermiformis (H)  Comment: stable  Plan: oxyCODONE-acetaminophen (PERCOCET) 5-325 MG         tablet, traMADol (ULTRAM) 50 MG tablet        Refilled meds.    (E11.9) Diabetes mellitus without complication (H)  Comment:    Plan: insulin glargine (BASAGLAR KWIKPEN) 100 UNIT/ML        pen                 Mik Kumari MD  [facility]

## 2019-03-04 NOTE — LETTER
March 5, 2019      Jaswant Chong  [address]  [place] [address]        Dear Jaswant,     Coming down, despite fewer meds.  This should drop even more when you get back on insulin.    Results for orders placed or performed in visit on 03/04/19   Hemoglobin A1c   Result Value Ref Range    Hemoglobin A1C 12.1 (H) 4.0 - 6.0 %           Sincerely,        Mik Kumari MD

## 2019-03-05 ASSESSMENT — ANXIETY QUESTIONNAIRES: GAD7 TOTAL SCORE: 0

## 2019-03-06 ENCOUNTER — TELEPHONE (OUTPATIENT)
Dept: FAMILY MEDICINE | Facility: OTHER | Age: 49
End: 2019-03-06

## 2019-06-24 ENCOUNTER — TELEPHONE (OUTPATIENT)
Dept: FAMILY MEDICINE | Facility: OTHER | Age: 49
End: 2019-06-24

## 2019-06-24 NOTE — TELEPHONE ENCOUNTER
Patient notified of message per Jailyn of tomorrow 1030. appt scheduled.  Dagmar Tolentino LPN .............6/24/2019     11:40 AM

## 2019-06-25 ENCOUNTER — OFFICE VISIT (OUTPATIENT)
Dept: FAMILY MEDICINE | Facility: OTHER | Age: 49
End: 2019-06-25
Attending: FAMILY MEDICINE
Payer: COMMERCIAL

## 2019-06-25 VITALS
TEMPERATURE: 98 F | BODY MASS INDEX: 38.22 KG/M2 | SYSTOLIC BLOOD PRESSURE: 136 MMHG | HEART RATE: 95 BPM | WEIGHT: 305.8 LBS | OXYGEN SATURATION: 96 % | DIASTOLIC BLOOD PRESSURE: 70 MMHG | RESPIRATION RATE: 16 BRPM
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
## 2019-06-25 DIAGNOSIS — S30.860A TICK BITE OF BACK, INITIAL ENCOUNTER: ICD-10-CM

## 2019-06-25 DIAGNOSIS — C18.1 MALIGNANT NEOPLASM OF APPENDIX VERMIFORMIS (H): Primary | ICD-10-CM

## 2019-06-25 LAB
BASOPHILS # BLD AUTO: 0.1 10E9/L (ref 0–0.2)
BASOPHILS NFR BLD AUTO: 0.8 %
DIFFERENTIAL METHOD BLD: NORMAL
EOSINOPHIL # BLD AUTO: 0.1 10E9/L (ref 0–0.7)
EOSINOPHIL NFR BLD AUTO: 0.7 %
ERYTHROCYTE [DISTWIDTH] IN BLOOD BY AUTOMATED COUNT: 12.4 % (ref 10–15)
HBA1C MFR BLD: 12.8 % (ref 4–6)
HCT VFR BLD AUTO: 45.5 % (ref 40–53)
HGB BLD-MCNC: 15.5 G/DL (ref 13.3–17.7)
IMM GRANULOCYTES # BLD: 0 10E9/L (ref 0–0.4)
IMM GRANULOCYTES NFR BLD: 0.1 %
LYMPHOCYTES # BLD AUTO: 3 10E9/L (ref 0.8–5.3)
LYMPHOCYTES NFR BLD AUTO: 39.5 %
MCH RBC QN AUTO: 31.7 PG (ref 26.5–33)
MCHC RBC AUTO-ENTMCNC: 34.1 G/DL (ref 31.5–36.5)
MCV RBC AUTO: 93 FL (ref 78–100)
MONOCYTES # BLD AUTO: 0.8 10E9/L (ref 0–1.3)
MONOCYTES NFR BLD AUTO: 10.9 %
NEUTROPHILS # BLD AUTO: 3.6 10E9/L (ref 1.6–8.3)
NEUTROPHILS NFR BLD AUTO: 48 %
PLATELET # BLD AUTO: 255 10E9/L (ref 150–450)
RBC # BLD AUTO: 4.89 10E12/L (ref 4.4–5.9)
WBC # BLD AUTO: 7.6 10E9/L (ref 4–11)

## 2019-06-25 PROCEDURE — 99214 OFFICE O/P EST MOD 30 MIN: CPT | Performed by: FAMILY MEDICINE

## 2019-06-25 PROCEDURE — 36415 COLL VENOUS BLD VENIPUNCTURE: CPT | Mod: ZL | Performed by: FAMILY MEDICINE

## 2019-06-25 PROCEDURE — 85025 COMPLETE CBC W/AUTO DIFF WBC: CPT | Mod: ZL | Performed by: FAMILY MEDICINE

## 2019-06-25 PROCEDURE — 83036 HEMOGLOBIN GLYCOSYLATED A1C: CPT | Mod: ZL | Performed by: FAMILY MEDICINE

## 2019-06-25 RX ORDER — DOXYCYCLINE 100 MG/1
100 CAPSULE ORAL 2 TIMES DAILY
Qty: 28 CAPSULE | Refills: 0 | Status: SHIPPED | OUTPATIENT
Start: 2019-06-25 | End: 2020-01-02

## 2019-06-25 RX ORDER — OXYCODONE AND ACETAMINOPHEN 5; 325 MG/1; MG/1
1 TABLET ORAL EVERY 4 HOURS PRN
Qty: 90 TABLET | Refills: 0 | Status: SHIPPED | OUTPATIENT
Start: 2019-06-25 | End: 2019-11-08

## 2019-06-25 RX ORDER — TRAMADOL HYDROCHLORIDE 50 MG/1
50 TABLET ORAL 4 TIMES DAILY
Qty: 90 TABLET | Refills: 3 | Status: SHIPPED | OUTPATIENT
Start: 2019-06-25 | End: 2019-11-08

## 2019-06-25 ASSESSMENT — PATIENT HEALTH QUESTIONNAIRE - PHQ9: 5. POOR APPETITE OR OVEREATING: NOT AT ALL

## 2019-06-25 ASSESSMENT — ANXIETY QUESTIONNAIRES
5. BEING SO RESTLESS THAT IT IS HARD TO SIT STILL: NOT AT ALL
3. WORRYING TOO MUCH ABOUT DIFFERENT THINGS: NOT AT ALL
2. NOT BEING ABLE TO STOP OR CONTROL WORRYING: NOT AT ALL
IF YOU CHECKED OFF ANY PROBLEMS ON THIS QUESTIONNAIRE, HOW DIFFICULT HAVE THESE PROBLEMS MADE IT FOR YOU TO DO YOUR WORK, TAKE CARE OF THINGS AT HOME, OR GET ALONG WITH OTHER PEOPLE: NOT DIFFICULT AT ALL
1. FEELING NERVOUS, ANXIOUS, OR ON EDGE: NOT AT ALL
6. BECOMING EASILY ANNOYED OR IRRITABLE: NOT AT ALL
GAD7 TOTAL SCORE: 0
7. FEELING AFRAID AS IF SOMETHING AWFUL MIGHT HAPPEN: NOT AT ALL

## 2019-06-25 ASSESSMENT — ENCOUNTER SYMPTOMS
SHORTNESS OF BREATH: 0
FEVER: 1
FATIGUE: 0
ARTHRALGIAS: 1
ABDOMINAL PAIN: 1

## 2019-06-25 ASSESSMENT — PAIN SCALES - GENERAL: PAINLEVEL: SEVERE PAIN (6)

## 2019-06-25 NOTE — PROGRESS NOTES
"  SUBJECTIVE:   Jaswant Chong is a 48 year old male who presents to clinic today for the following health issues:    HPI  Many issues. Has abdomen pain next to the hernia, worse in the past few weeks.  Feels he has pulled a muscle.  He is very active, trains hunting dogs, walks several miles daily.  Uses the percocet more as needed, 2-3 times daily.  Tramadol is daily.    Had a tick bite May 12 on his back.was imbedded.  Since then has felt \"achey\".  Worse in the morning and improves with use.  Low grade fevers.  No rash from it.  Was a deer tick.  Wife dug it out with a forceps.  He would rather treat than get tested.    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     Priority: Medium     Recurrent ventral hernia 09/14/2015     Priority: Medium     Controlled substance agreement signed 08/13/2015     Priority: Medium     Major depressive disorder, recurrent episode, moderate (H) 08/13/2015     Priority: Medium     Degenerative joint disease (DJD) of hip 02/06/2015     Priority: Medium     Hernia 12/03/2012     Priority: Medium     Abdominal pain 02/16/2012     Priority: Medium     Diabetes mellitus without complication (H) 12/07/2011     Priority: Medium     Ventral " hernia with obstruction 12/06/2011     Priority: Medium     Varicocele 05/03/2011     Priority: Medium     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     EXCISE CYST GENERIC (LOCATION)      sebaceous, scalp     OTHER SURGICAL HISTORY      2007,XSG638,LYMPH NODE DISSECTION     OTHER SURGICAL HISTORY      03/08,,HERNIA REPAIR,Repair of surgical wound hernia, metastatic cancer incidentally noted.     OTHER SURGICAL HISTORY      04/08,26216.9,HX ABDOMEN PERITONEUM OMENTUM SURGERY,removal of left hemidiaphragm and omentum [Other] as well as intraperitoneal chemotherapy, 5 FU and Oxaliplatin for metastatic mucinous adenocarcinoma of the appendix.[[[     OTHER SURGICAL HISTORY      04/09,,HERNIA REPAIR, Ventral hernia repair, recurrent appendiceal carcinoma, resection of 7 centimeter left upper quadrant tumor with splenectomy, Surgeon, Dr. Marks     OTHER SURGICAL HISTORY      11/2001,,HERNIA REPAIR,Reduction of ventral hernia and mesh repair of hernia     OTHER SURGICAL HISTORY      12/2012,,HERNIA REPAIR,Removal mesh with infection 12/12     OTHER SURGICAL HISTORY      4/25/13,[id number],OTHER,left thumb, Dr. Donald     Social History     Tobacco Use     Smoking status: Never Smoker     Smokeless tobacco: Never Used   Substance Use Topics     Alcohol use: Yes     Alcohol/week: 0.0 oz     Comment: Alcoholic Drinks/day: social     Current Outpatient Medications   Medication Sig Dispense Refill     BD VELIA U/F 32G X 4 MM insulin pen needle INJECTING THREE TIMES DAILY BEFORE MEALS 300 each 3     blood glucose monitoring (CONTOUR NEXT TEST) test strip Use to test blood sugar 2 times daily or as directed. 300 strip 3     blood  glucose monitoring (COOL BLOOD GLUCOSE TEST STRIPS) test strip Use to test blood sugar 2 times daily or as directed. 200 each 3     Blood Glucose Monitoring Suppl (FIFTY50 GLUCOSE METER 2.0) w/Device KIT Check twice daily 1 kit 3     Blood Pressure KIT Blood pressure machine       doxycycline hyclate (VIBRAMYCIN) 100 MG capsule Take 1 capsule (100 mg) by mouth 2 times daily for 14 days 28 capsule 0     insulin glargine (BASAGLAR KWIKPEN) 100 UNIT/ML pen Inject 20 Units Subcutaneous daily 30 mL 3     insulin lispro (HUMALOG PEN) 100 UNIT/ML pen Inject 5 Units Subcutaneous 3 times daily (before meals) May substitute Admelog 15 mL 3     insulin lispro (HUMALOG PEN) 100 UNIT/ML pen Inject 5 Units Subcutaneous 3 times daily (before meals) 15 mL 3     insulin pen needle (NOVOFINE) 32G X 6 MM miscellaneous Use 1 daily or as directed. 100 each prn     lancets 28G MISC Dispense item covered by pt ins. E11.9 NIDDM type II - Test 3 times/day, Reason: Unstable diabetes       liraglutide (VICTOZA PEN) 18 MG/3ML solution Inject 0.6 mg Subcutaneous daily 9 mL 3     lisinopril (PRINIVIL/ZESTRIL) 40 MG tablet Take 1 tablet (40 mg) by mouth daily 90 tablet 3     metFORMIN (GLUCOPHAGE) 1000 MG tablet Take 1 tablet (1,000 mg) by mouth 2 times daily (with meals) 180 tablet 3     metFORMIN (GLUCOPHAGE) 1000 MG tablet Take 1,000 mg by mouth 2 times daily (with meals)       oxyCODONE-acetaminophen (PERCOCET) 5-325 MG tablet Take 1 tablet by mouth every 4 hours as needed for pain 90 tablet 0     pravastatin (PRAVACHOL) 20 MG tablet Take 1 tablet (20 mg) by mouth At Bedtime 90 tablet 3     traMADol (ULTRAM) 50 MG tablet Take 1 tablet (50 mg) by mouth 4 times daily 90 tablet 3     No Known Allergies    Review of Systems   Constitutional: Positive for fever. Negative for fatigue.   Respiratory: Negative for shortness of breath.    Cardiovascular: Negative for chest pain.   Gastrointestinal: Positive for abdominal pain.   Musculoskeletal:  Positive for arthralgias.   Skin: Negative for rash.        OBJECTIVE:     /70 (BP Location: Right arm, Patient Position: Sitting, Cuff Size: Adult Large)   Pulse 95   Temp 98  F (36.7  C) (Temporal)   Resp 16   Wt 138.7 kg (305 lb 12.8 oz)   SpO2 96%   BMI 38.22 kg/m    Body mass index is 38.22 kg/m .  Physical Exam   Constitutional: He is oriented to person, place, and time. He appears well-developed. No distress.   Cardiovascular: Normal rate, regular rhythm and normal heart sounds. Exam reveals no friction rub.   No murmur heard.  Pulmonary/Chest: Effort normal and breath sounds normal. No stridor. No respiratory distress. He has no wheezes. He has no rales.   Abdominal:   Very large abdomen hernia, not reducible, not tender.  Pain on palpation lateral to the hernia on LUQ area.  But soft and no rebound.   Neurological: He is alert and oriented to person, place, and time.   Skin: He is not diaphoretic.       Diagnostic Test Results:  Results for orders placed or performed in visit on 06/25/19   Hemoglobin A1c   Result Value Ref Range    Hemoglobin A1C 12.8 (H) 4.0 - 6.0 %   CBC and Differential   Result Value Ref Range    WBC 7.6 4.0 - 11.0 10e9/L    RBC Count 4.89 4.4 - 5.9 10e12/L    Hemoglobin 15.5 13.3 - 17.7 g/dL    Hematocrit 45.5 40.0 - 53.0 %    MCV 93 78 - 100 fl    MCH 31.7 26.5 - 33.0 pg    MCHC 34.1 31.5 - 36.5 g/dL    RDW 12.4 10.0 - 15.0 %    Platelet Count 255 150 - 450 10e9/L    Diff Method Automated Method     % Neutrophils 48.0 %    % Lymphocytes 39.5 %    % Monocytes 10.9 %    % Eosinophils 0.7 %    % Basophils 0.8 %    % Immature Granulocytes 0.1 %    Absolute Neutrophil 3.6 1.6 - 8.3 10e9/L    Absolute Lymphocytes 3.0 0.8 - 5.3 10e9/L    Absolute Monocytes 0.8 0.0 - 1.3 10e9/L    Absolute Eosinophils 0.1 0.0 - 0.7 10e9/L    Absolute Basophils 0.1 0.0 - 0.2 10e9/L    Abs Immature Granulocytes 0.0 0 - 0.4 10e9/L         ASSESSMENT/PLAN:     (C18.1) Malignant neoplasm of appendix  vermiformis (H)  (primary encounter diagnosis)  Comment: the narcotics are for cancer treatment related complications.;  Refilled them for him.  Pain on exam is basically the same.    Plan: traMADol (ULTRAM) 50 MG tablet,         oxyCODONE-acetaminophen (PERCOCET) 5-325 MG         tablet             (E11.65) Uncontrolled diabetes mellitus type 2 without complications (H)  Comment: essentially unchanged. Remains a significant issue with diet and medication compliance.  Will continue to ask him to improve both of these.    Plan: Hemoglobin A1c         Follow up in 3 months    (S30.860A,  W57.XXXA) Tick bite of back, initial encounter  Comment: new finding  Plan: CBC and Differential, doxycycline hyclate         (VIBRAMYCIN) 100 MG capsule        He wants to treat likely tick born illness. Knows the labs can have a high false negative result acutely.  Follow up in 2 weeks if not improving.        Mik Kumari MD  [facility]

## 2019-06-25 NOTE — NURSING NOTE
"Coming in for a Diabetic check, check lump byHernia  Tick bite     Chief Complaint   Patient presents with     Diabetes     labs, check, had a tick bite in May, new Rx's. pulled something next to the Hernia       Initial /70 (BP Location: Right arm, Patient Position: Sitting, Cuff Size: Adult Large)   Pulse 95   Temp 98  F (36.7  C) (Temporal)   Resp 16   Wt 138.7 kg (305 lb 12.8 oz)   SpO2 96%   BMI 38.22 kg/m   Estimated body mass index is 38.22 kg/m  as calculated from the following:    Height as of 7/26/18: 1.905 m (6' 3\").    Weight as of this encounter: 138.7 kg (305 lb 12.8 oz).  Medication Reconciliation: complete    Jailyn Baker LPN     Previous A1C is not at goal of <8  Lab Results   Component Value Date    A1C 12.8 06/25/2019    A1C 12.1 03/04/2019    A1C 13.9 11/30/2018    A1C 13.6 05/30/2018     Urine microalbumin:creatine: 5/30/2018  Foot exam 6/25/2019  Eye exam due    Tobacco User no  Patient is not on a daily aspirin  Patient is on a Statin.  Blood pressure today of:     BP Readings from Last 1 Encounters:   06/25/19 136/70      is at the goal of <139/89 for diabetics.    Jialyn Baker LPN on 6/25/2019 at 1:47 PM      "

## 2019-06-25 NOTE — LETTER
June 25, 2019      Jaswant Simonyinka  [address]  [place] [address]        Dear Jaswant,     This is all about the same.    Results for orders placed or performed in visit on 06/25/19   Hemoglobin A1c   Result Value Ref Range    Hemoglobin A1C 12.8 (H) 4.0 - 6.0 %   CBC and Differential   Result Value Ref Range    WBC 7.6 4.0 - 11.0 10e9/L    RBC Count 4.89 4.4 - 5.9 10e12/L    Hemoglobin 15.5 13.3 - 17.7 g/dL    Hematocrit 45.5 40.0 - 53.0 %    MCV 93 78 - 100 fl    MCH 31.7 26.5 - 33.0 pg    MCHC 34.1 31.5 - 36.5 g/dL    RDW 12.4 10.0 - 15.0 %    Platelet Count 255 150 - 450 10e9/L    Diff Method Automated Method     % Neutrophils 48.0 %    % Lymphocytes 39.5 %    % Monocytes 10.9 %    % Eosinophils 0.7 %    % Basophils 0.8 %    % Immature Granulocytes 0.1 %    Absolute Neutrophil 3.6 1.6 - 8.3 10e9/L    Absolute Lymphocytes 3.0 0.8 - 5.3 10e9/L    Absolute Monocytes 0.8 0.0 - 1.3 10e9/L    Absolute Eosinophils 0.1 0.0 - 0.7 10e9/L    Absolute Basophils 0.1 0.0 - 0.2 10e9/L    Abs Immature Granulocytes 0.0 0 - 0.4 10e9/L           Sincerely,        Mik Kumari MD

## 2019-06-26 ASSESSMENT — ANXIETY QUESTIONNAIRES: GAD7 TOTAL SCORE: 0

## 2019-10-08 ENCOUNTER — TELEPHONE (OUTPATIENT)
Dept: FAMILY MEDICINE | Facility: OTHER | Age: 49
End: 2019-10-08

## 2019-10-08 NOTE — TELEPHONE ENCOUNTER
[facility] IS IN THE ER RIGHT NOW, TYSON WANTED YOU TO GO IN AND TAKE A LOOK AT THIS PATIENT ( THINKS HE MAY HAVE HAD A HEART ATTACK)  WIFE SHIRIN IS CONCERNED  NUMBER [phone]

## 2019-10-08 NOTE — TELEPHONE ENCOUNTER
Returning the call for Jailyn only, he states it is a personal issue that he does not want to share

## 2019-11-08 ENCOUNTER — OFFICE VISIT (OUTPATIENT)
Dept: FAMILY MEDICINE | Facility: OTHER | Age: 49
End: 2019-11-08
Attending: FAMILY MEDICINE
Payer: COMMERCIAL

## 2019-11-08 VITALS
RESPIRATION RATE: 16 BRPM | HEART RATE: 87 BPM | TEMPERATURE: 98.6 F | DIASTOLIC BLOOD PRESSURE: 110 MMHG | WEIGHT: 289 LBS | OXYGEN SATURATION: 95 % | BODY MASS INDEX: 36.12 KG/M2 | SYSTOLIC BLOOD PRESSURE: 176 MMHG

## 2019-11-08 DIAGNOSIS — I10 HTN (HYPERTENSION), MALIGNANT: ICD-10-CM

## 2019-11-08 DIAGNOSIS — Z23 FLU VACCINE NEED: ICD-10-CM

## 2019-11-08 DIAGNOSIS — C18.1 MALIGNANT NEOPLASM OF APPENDIX VERMIFORMIS (H): Primary | ICD-10-CM

## 2019-11-08 DIAGNOSIS — E11.9 DIABETES MELLITUS WITHOUT COMPLICATION (H): ICD-10-CM

## 2019-11-08 LAB
ALBUMIN SERPL-MCNC: 4.1 G/DL (ref 3.5–5.7)
ALP SERPL-CCNC: 97 U/L (ref 34–104)
ALT SERPL W P-5'-P-CCNC: 43 U/L (ref 7–52)
ANION GAP SERPL CALCULATED.3IONS-SCNC: 6 MMOL/L (ref 6–17)
AST SERPL W P-5'-P-CCNC: 32 U/L (ref 13–39)
BILIRUB SERPL-MCNC: 0.7 MG/DL (ref 0.3–1)
BUN SERPL-MCNC: 19 MG/DL (ref 7–25)
CALCIUM SERPL-MCNC: 9.3 MG/DL (ref 8.6–10.3)
CHLORIDE SERPL-SCNC: 95 MMOL/L (ref 98–107)
CO2 SERPL-SCNC: 30 MMOL/L (ref 21–31)
CREAT SERPL-MCNC: 1.02 MG/DL (ref 0.7–1.3)
GFR SERPL CREATININE-BSD FRML MDRD: ABNORMAL ML/MIN/{1.73_M2}
GLUCOSE SERPL-MCNC: 402 MG/DL (ref 70–105)
HBA1C MFR BLD: 12.5 % (ref 4–6)
POTASSIUM SERPL-SCNC: 4 MMOL/L (ref 3.5–5.1)
PROT SERPL-MCNC: 8.4 G/DL (ref 6.4–8.9)
SODIUM SERPL-SCNC: 131 MMOL/L (ref 134–144)

## 2019-11-08 PROCEDURE — 82043 UR ALBUMIN QUANTITATIVE: CPT | Mod: ZL | Performed by: FAMILY MEDICINE

## 2019-11-08 PROCEDURE — 36415 COLL VENOUS BLD VENIPUNCTURE: CPT | Mod: ZL | Performed by: FAMILY MEDICINE

## 2019-11-08 PROCEDURE — 99214 OFFICE O/P EST MOD 30 MIN: CPT | Mod: 25 | Performed by: FAMILY MEDICINE

## 2019-11-08 PROCEDURE — 90686 IIV4 VACC NO PRSV 0.5 ML IM: CPT | Performed by: FAMILY MEDICINE

## 2019-11-08 PROCEDURE — 80053 COMPREHEN METABOLIC PANEL: CPT | Mod: ZL | Performed by: FAMILY MEDICINE

## 2019-11-08 PROCEDURE — 83036 HEMOGLOBIN GLYCOSYLATED A1C: CPT | Mod: ZL | Performed by: FAMILY MEDICINE

## 2019-11-08 PROCEDURE — 90471 IMMUNIZATION ADMIN: CPT | Performed by: FAMILY MEDICINE

## 2019-11-08 RX ORDER — LISINOPRIL 20 MG/1
20 TABLET ORAL DAILY
Qty: 90 TABLET | Refills: 3 | Status: SHIPPED | OUTPATIENT
Start: 2019-11-08 | End: 2019-12-03

## 2019-11-08 RX ORDER — OXYCODONE AND ACETAMINOPHEN 5; 325 MG/1; MG/1
1 TABLET ORAL EVERY 4 HOURS PRN
Qty: 90 TABLET | Refills: 0 | Status: SHIPPED | OUTPATIENT
Start: 2019-11-08 | End: 2020-04-09

## 2019-11-08 RX ORDER — INSULIN GLARGINE 100 [IU]/ML
20 INJECTION, SOLUTION SUBCUTANEOUS DAILY
Qty: 30 ML | Refills: 3 | Status: SHIPPED | OUTPATIENT
Start: 2019-11-08 | End: 2020-01-09

## 2019-11-08 RX ORDER — TRAMADOL HYDROCHLORIDE 50 MG/1
50 TABLET ORAL 4 TIMES DAILY
Qty: 90 TABLET | Refills: 5 | Status: SHIPPED | OUTPATIENT
Start: 2019-11-08 | End: 2020-01-09

## 2019-11-08 ASSESSMENT — PATIENT HEALTH QUESTIONNAIRE - PHQ9: 5. POOR APPETITE OR OVEREATING: NOT AT ALL

## 2019-11-08 ASSESSMENT — PAIN SCALES - GENERAL: PAINLEVEL: SEVERE PAIN (7)

## 2019-11-08 ASSESSMENT — ANXIETY QUESTIONNAIRES
IF YOU CHECKED OFF ANY PROBLEMS ON THIS QUESTIONNAIRE, HOW DIFFICULT HAVE THESE PROBLEMS MADE IT FOR YOU TO DO YOUR WORK, TAKE CARE OF THINGS AT HOME, OR GET ALONG WITH OTHER PEOPLE: NOT DIFFICULT AT ALL
1. FEELING NERVOUS, ANXIOUS, OR ON EDGE: NOT AT ALL
2. NOT BEING ABLE TO STOP OR CONTROL WORRYING: NOT AT ALL
7. FEELING AFRAID AS IF SOMETHING AWFUL MIGHT HAPPEN: NOT AT ALL
6. BECOMING EASILY ANNOYED OR IRRITABLE: NOT AT ALL
GAD7 TOTAL SCORE: 0
5. BEING SO RESTLESS THAT IT IS HARD TO SIT STILL: NOT AT ALL
3. WORRYING TOO MUCH ABOUT DIFFERENT THINGS: NOT AT ALL

## 2019-11-08 ASSESSMENT — ENCOUNTER SYMPTOMS
SHORTNESS OF BREATH: 1
FATIGUE: 1
SLEEP DISTURBANCE: 1
ABDOMINAL DISTENTION: 1
ABDOMINAL PAIN: 1
MYALGIAS: 1

## 2019-11-08 NOTE — NURSING NOTE
"Coming in for a medication check up, has other issues    Chief Complaint   Patient presents with     Recheck Medication     other issues       Initial BP (!) 176/110 (BP Location: Right arm, Patient Position: Sitting, Cuff Size: Adult Large)   Pulse 87   Temp 98.6  F (37  C) (Tympanic)   Resp 16   Wt 131.1 kg (289 lb)   SpO2 95%   BMI 36.12 kg/m   Estimated body mass index is 36.12 kg/m  as calculated from the following:    Height as of 7/26/18: 1.905 m (6' 3\").    Weight as of this encounter: 131.1 kg (289 lb).  Medication Reconciliation: complete    Jailyn Baker LPN  "

## 2019-11-08 NOTE — LETTER
November 11, 2019      Jaswant Chong  [address]  [place] [address]        Dear Jaswant,     It is all about the same.    Results for orders placed or performed in visit on 11/08/19   Comprehensive Metabolic Panel     Status: Abnormal   Result Value Ref Range    Sodium 131 (L) 134 - 144 mmol/L    Potassium 4.0 3.5 - 5.1 mmol/L    Chloride 95 (L) 98 - 107 mmol/L    Carbon Dioxide 30 21 - 31 mmol/L    Anion Gap 6 6 - 17 mmol/L    Glucose 402 (H) 70 - 105 mg/dL    Urea Nitrogen 19 7 - 25 mg/dL    Creatinine 1.02 0.70 - 1.30 mg/dL    GFR Estimate Not Calculated >60 mL/min/[1.73_m2]    GFR Estimate If Black Not Calculated >60 mL/min/[1.73_m2]    Calcium 9.3 8.6 - 10.3 mg/dL    Bilirubin Total 0.7 0.3 - 1.0 mg/dL    Albumin 4.1 3.5 - 5.7 g/dL    Protein Total 8.4 6.4 - 8.9 g/dL    Alkaline Phosphatase 97 34 - 104 U/L    ALT 43 7 - 52 U/L    AST 32 13 - 39 U/L   Hemoglobin A1c     Status: Abnormal   Result Value Ref Range    Hemoglobin A1C 12.5 (H) 4.0 - 6.0 %     Sincerely,        Mik Kumari MD

## 2019-11-08 NOTE — PROGRESS NOTES
SUBJECTIVE:   Jaswant Chong is a 49 year old male who presents to clinic today for the following health issues:    HPI  Follow up on many issues.  Has stopped all of his meds.  Was too busy with work.  Not checking sugars at all.      Significant abdomen pain from the hernia.  This is cancer related.  Has not seen his surgical oncologist for about 4-5 years.  Is a , walking 17,000 steps daily, but notes some more back pain, some mild shortness of breath.  Has lost more weight 16# from last visit.  More cramps diffusely.  Eating 3 bananas and half a jar of pickle juice daily for this.  Sleeps about 4 hours nightly, mostly because of stress and occupational therapy from the pain specifically.    Recent Labs   Lab Test 06/25/19  1134 03/04/19  1119 11/30/18  1038 07/26/18  1147 05/30/18  1110  12/20/16  1140  11/24/15  1308   A1C 12.8* 12.1* 13.9*  --  13.6*   < >  --   --   --    LDL  --   --   --   --  140*  --  122*  --  115*   HDL  --   --   --   --  31  --  37  --  36   TRIG  --   --   --   --  318*  --  237*  --  184*   ALT  --   --  33  --  56*  --   --   --   --    CR  --   --  0.81 0.79 0.88   < >  --    < >  --    GFRESTIMATED  --   --  >90 >90 >90   < >  --   --   --    GFRESTBLACK  --   --  >90 >90 >90   < >  --    < >  --    POTASSIUM  --   --  3.8 3.8 4.0   < >  --    < >  --     < > = values in this interval not displayed.              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     Priority: Medium     Recurrent ventral hernia 09/14/2015     Priority: Medium     Controlled substance agreement signed 08/13/2015     Priority: Medium     Major depressive disorder, recurrent episode, moderate (H) 08/13/2015     Priority: Medium     Degenerative joint disease (DJD) of hip 02/06/2015     Priority: Medium     Hernia 12/03/2012     Priority: Medium     Abdominal pain 02/16/2012     Priority: Medium     Diabetes mellitus without complication (H) 12/07/2011     Priority: Medium     Ventral hernia with obstruction 12/06/2011     Priority: Medium     Varicocele 05/03/2011     Priority: Medium     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     EXCISE CYST GENERIC (LOCATION)      sebaceous, scalp     OTHER SURGICAL HISTORY      2007,[id number],LYMPH NODE DISSECTION     OTHER SURGICAL HISTORY      03/08,,HERNIA REPAIR,Repair of surgical wound hernia, metastatic cancer incidentally noted.     OTHER SURGICAL HISTORY      04/08,38518.9,HX ABDOMEN PERITONEUM OMENTUM SURGERY,removal of left hemidiaphragm and omentum [Other] as well as intraperitoneal chemotherapy, 5 FU and Oxaliplatin for metastatic mucinous adenocarcinoma of the appendix.[[[     OTHER SURGICAL HISTORY      04/09,,HERNIA REPAIR, Ventral hernia repair, recurrent appendiceal carcinoma, resection of 7 centimeter left upper quadrant tumor with splenectomy, Surgeon, Dr. Marks     OTHER SURGICAL HISTORY      11/2001,,HERNIA  REPAIR,Reduction of ventral hernia and mesh repair of hernia     OTHER SURGICAL HISTORY      12/2012,,HERNIA REPAIR,Removal mesh with infection 12/12     OTHER SURGICAL HISTORY      4/25/13,027221,OTHER,left thumb, Dr. Donald     Social History     Tobacco Use     Smoking status: Never Smoker     Smokeless tobacco: Never Used   Substance Use Topics     Alcohol use: Yes     Alcohol/week: 0.0 standard drinks     Comment: Alcoholic Drinks/day: social     Current Outpatient Medications   Medication Sig Dispense Refill     BD VELIA U/F 32G X 4 MM insulin pen needle INJECTING THREE TIMES DAILY BEFORE MEALS 300 each 3     blood glucose monitoring (CONTOUR NEXT TEST) test strip Use to test blood sugar 2 times daily or as directed. 300 strip 3     blood glucose monitoring (COOL BLOOD GLUCOSE TEST STRIPS) test strip Use to test blood sugar 2 times daily or as directed. 200 each 3     Blood Glucose Monitoring Suppl (FIFTY50 GLUCOSE METER 2.0) w/Device KIT Check twice daily 1 kit 3     Blood Pressure KIT Blood pressure machine       insulin glargine (BASAGLAR KWIKPEN) 100 UNIT/ML pen Inject 20 Units Subcutaneous daily 30 mL 3     insulin lispro (HUMALOG PEN) 100 UNIT/ML pen Inject 5 Units Subcutaneous 3 times daily (before meals) May substitute Admelog 15 mL 3     insulin lispro (HUMALOG PEN) 100 UNIT/ML pen Inject 5 Units Subcutaneous 3 times daily (before meals) 15 mL 3     insulin pen needle (NOVOFINE) 32G X 6 MM miscellaneous Use 1 daily or as directed. 100 each prn     lancets 28G MISC Dispense item covered by pt ins. E11.9 NIDDM type II - Test 3 times/day, Reason: Unstable diabetes       liraglutide (VICTOZA PEN) 18 MG/3ML solution Inject 0.6 mg Subcutaneous daily 9 mL 3     metFORMIN (GLUCOPHAGE) 1000 MG tablet Take 1 tablet (1,000 mg) by mouth 2 times daily (with meals) 180 tablet 3     metFORMIN (GLUCOPHAGE) 1000 MG tablet Take 1,000 mg by mouth 2 times daily (with meals)       oxyCODONE-acetaminophen (PERCOCET)  5-325 MG tablet Take 1 tablet by mouth every 4 hours as needed for pain 90 tablet 0     pravastatin (PRAVACHOL) 20 MG tablet Take 1 tablet (20 mg) by mouth At Bedtime 90 tablet 3     traMADol (ULTRAM) 50 MG tablet Take 1 tablet (50 mg) by mouth 4 times daily 90 tablet 5     No Known Allergies    Review of Systems   Constitutional: Positive for fatigue.   Respiratory: Positive for shortness of breath.    Cardiovascular: Negative for chest pain.   Gastrointestinal: Positive for abdominal distention and abdominal pain.   Musculoskeletal: Positive for myalgias.   Psychiatric/Behavioral: Positive for sleep disturbance.        OBJECTIVE:     BP (!) 176/110 (BP Location: Right arm, Patient Position: Sitting, Cuff Size: Adult Large)   Pulse 87   Temp 98.6  F (37  C) (Tympanic)   Resp 16   Wt 131.1 kg (289 lb)   SpO2 95%   BMI 36.12 kg/m    Body mass index is 36.12 kg/m .  Physical Exam  Constitutional:       Appearance: Normal appearance.   Cardiovascular:      Rate and Rhythm: Normal rate and regular rhythm.      Pulses: Normal pulses.      Heart sounds: Normal heart sounds. No murmur. No friction rub.   Pulmonary:      Effort: Pulmonary effort is normal. No respiratory distress.      Breath sounds: No stridor.   Abdominal:      Comments: Soft, non tender.  Significant distension diffusely from large hernia.     Skin:     General: Skin is warm and dry.   Neurological:      General: No focal deficit present.      Mental Status: He is alert and oriented to person, place, and time.           Sensory exam of the foot is normal, tested with the monofilament. Good pulses, no lesions or ulcers, good peripheral pulses.    Diagnostic Test Results:  Results for orders placed or performed in visit on 11/08/19   Comprehensive Metabolic Panel     Status: Abnormal   Result Value Ref Range    Sodium 131 (L) 134 - 144 mmol/L    Potassium 4.0 3.5 - 5.1 mmol/L    Chloride 95 (L) 98 - 107 mmol/L    Carbon Dioxide 30 21 - 31 mmol/L     Anion Gap 6 6 - 17 mmol/L    Glucose 402 (H) 70 - 105 mg/dL    Urea Nitrogen 19 7 - 25 mg/dL    Creatinine 1.02 0.70 - 1.30 mg/dL    GFR Estimate Not Calculated >60 mL/min/[1.73_m2]    GFR Estimate If Black Not Calculated >60 mL/min/[1.73_m2]    Calcium 9.3 8.6 - 10.3 mg/dL    Bilirubin Total 0.7 0.3 - 1.0 mg/dL    Albumin 4.1 3.5 - 5.7 g/dL    Protein Total 8.4 6.4 - 8.9 g/dL    Alkaline Phosphatase 97 34 - 104 U/L    ALT 43 7 - 52 U/L    AST 32 13 - 39 U/L   Hemoglobin A1c     Status: Abnormal   Result Value Ref Range    Hemoglobin A1C 12.5 (H) 4.0 - 6.0 %   Albumin Random Urine Quantitative with Creat Ratio     Status: Abnormal   Result Value Ref Range    Creatinine Urine 87 mg/dL    Albumin Urine mg/L 1,240 mg/L    Albumin Urine mg/g Cr 1,420.39 (H) 0 - 17 mg/g Cr       ASSESSMENT/PLAN:         (C18.1) Malignant neoplasm of appendix vermiformis (H)  (primary encounter diagnosis)  Comment: no significant change, ongoing pain from surgical complications.    Plan: traMADol (ULTRAM) 50 MG tablet,         oxyCODONE-acetaminophen (PERCOCET) 5-325 MG         tablet             (E11.65) Uncontrolled diabetes mellitus type 2 without complications (H)  Comment: very challenging in that he has stopped all of his meds.  Plan: metFORMIN (GLUCOPHAGE) 1000 MG tablet,         Hemoglobin A1c, Albumin Random Urine         Quantitative with Creat Ratio, GH IMM-  DTAP         IMMUNIZATION (<7Y), IM (INFANRIX )        Resume meds    (E11.9) Diabetes mellitus without complication (H)  Comment:    Plan: insulin glargine (BASAGLAR KWIKPEN) 100 UNIT/ML        pen             (I10) HTN (hypertension), malignant  Comment: stable now  Plan: Comprehensive Metabolic Panel, lisinopril         (PRINIVIL/ZESTRIL) 20 MG tablet        Significant elevation in microalbuminuria so repeat again once he has resumed meds.        Mik Kumari MD  [facility]

## 2019-11-09 ASSESSMENT — ANXIETY QUESTIONNAIRES: GAD7 TOTAL SCORE: 0

## 2019-11-11 LAB
CREAT UR-MCNC: 87 MG/DL
MICROALBUMIN UR-MCNC: 1240 MG/L
MICROALBUMIN/CREAT UR: 1420.39 MG/G CR (ref 0–17)

## 2019-12-02 ENCOUNTER — MYC MEDICAL ADVICE (OUTPATIENT)
Dept: FAMILY MEDICINE | Facility: OTHER | Age: 49
End: 2019-12-02

## 2019-12-02 DIAGNOSIS — F51.01 PRIMARY INSOMNIA: Primary | ICD-10-CM

## 2019-12-02 RX ORDER — TRAZODONE HYDROCHLORIDE 50 MG/1
50 TABLET, FILM COATED ORAL AT BEDTIME
Qty: 90 TABLET | Refills: 3 | Status: SHIPPED | OUTPATIENT
Start: 2019-12-02 | End: 2020-12-17

## 2019-12-02 NOTE — TELEPHONE ENCOUNTER
"My Chart Message from patient:    \"It's not that I'm stopping breathing. I'm  Awake and get short of breath just laying in bed. It also like I can't get a full breath. I can only take so  Much air in. Not sure if it's related to my diaphragm.     Also, it might seem crazy but when I lay on my right side and breath I can feel  Like a small ball in my abdomen. Near where my small hernia is. I would think it's my hernia   Feels like it.     Just some more detail for you to ponder.\"    Pt states he becomes SOB while lying down (supine/prone/side lying) and only gets about 2 hours of sleep at night and up to 5-6 hours at most.  Has been ongoing for about 1 month. States he did experience some of this SOB after partial removal of diaphragm due to cancer but is more constant nightly now.  He denies chest pain/heaviness and states he gets up and is able to slow his breathing so that he can catch his breath.  Describes SOB as if he cannot get a full breath.  He states this cause a dry hacking non-productive cough that he also experiences intermittently at times during the day.  Nagging cough started sometime in October as well.  Pt denies lower leg edema but does wear compression socks.  Pt denies any weight gain but states he has lost some weight on purpose.  States that eating a meal or eating late does not seem to exasperate SOB symptoms. Pt does sleep somewhat elevated which helps somewhat with the SOB.    Pt also c/o of elevated BP (176/110) LOV 11/8/2019.  Pt was advised to f/u in 3 weeks. Reported in visit that Pt had stopped medications-noted significant elevation in microalbuminuria.  Pt states he has been taking lisinopril as prescribed.  Denies blurry vision. States he had a daily headache that has since gone away since stopping metformin 1 week ago. Pt states he did have an angiogram last spring and that was \"clear\".  Pt does state that he does get shaky at times.  Pt not able to assess BP at home. "     Recommended that Pt be seen for evaluation with PCP and to present with ED with any worsening of symptoms/chest pain. Pt in agreement with plan and states he is available tomorrow and then will be out of town for several days.  Pt requesting call back in the morning to see if Dr. Kumari can work him in.      Had 8:15 am slot held.  Will route to PCP and nurse Jailyn.    Jessika Zavala RN  ....................  12/2/2019   4:22 PM

## 2019-12-03 ENCOUNTER — OFFICE VISIT (OUTPATIENT)
Dept: FAMILY MEDICINE | Facility: OTHER | Age: 49
End: 2019-12-03
Attending: FAMILY MEDICINE
Payer: COMMERCIAL

## 2019-12-03 ENCOUNTER — MYC MEDICAL ADVICE (OUTPATIENT)
Dept: FAMILY MEDICINE | Facility: OTHER | Age: 49
End: 2019-12-03

## 2019-12-03 ENCOUNTER — HOSPITAL ENCOUNTER (OUTPATIENT)
Dept: GENERAL RADIOLOGY | Facility: OTHER | Age: 49
Discharge: HOME | End: 2019-12-03
Attending: FAMILY MEDICINE | Admitting: FAMILY MEDICINE
Payer: COMMERCIAL

## 2019-12-03 VITALS
DIASTOLIC BLOOD PRESSURE: 92 MMHG | RESPIRATION RATE: 16 BRPM | HEART RATE: 92 BPM | SYSTOLIC BLOOD PRESSURE: 154 MMHG | BODY MASS INDEX: 35 KG/M2 | WEIGHT: 280 LBS | OXYGEN SATURATION: 93 % | TEMPERATURE: 98.4 F

## 2019-12-03 DIAGNOSIS — R06.02 SHORTNESS OF BREATH: ICD-10-CM

## 2019-12-03 DIAGNOSIS — C18.1 MALIGNANT NEOPLASM OF APPENDIX VERMIFORMIS (H): ICD-10-CM

## 2019-12-03 DIAGNOSIS — J90 PLEURAL EFFUSION ON RIGHT: Primary | ICD-10-CM

## 2019-12-03 DIAGNOSIS — I10 HTN (HYPERTENSION), MALIGNANT: ICD-10-CM

## 2019-12-03 LAB
BASOPHILS # BLD AUTO: 0 10E9/L (ref 0–0.2)
BASOPHILS NFR BLD AUTO: 0.4 %
DIFFERENTIAL METHOD BLD: NORMAL
EOSINOPHIL # BLD AUTO: 0.1 10E9/L (ref 0–0.7)
EOSINOPHIL NFR BLD AUTO: 0.6 %
ERYTHROCYTE [DISTWIDTH] IN BLOOD BY AUTOMATED COUNT: 12.7 % (ref 10–15)
HCT VFR BLD AUTO: 44.2 % (ref 40–53)
HGB BLD-MCNC: 15.1 G/DL (ref 13.3–17.7)
IMM GRANULOCYTES # BLD: 0 10E9/L (ref 0–0.4)
IMM GRANULOCYTES NFR BLD: 0.2 %
LIPASE SERPL-CCNC: 35 U/L (ref 11–82)
LYMPHOCYTES # BLD AUTO: 3.3 10E9/L (ref 0.8–5.3)
LYMPHOCYTES NFR BLD AUTO: 30.2 %
MCH RBC QN AUTO: 32.1 PG (ref 26.5–33)
MCHC RBC AUTO-ENTMCNC: 34.2 G/DL (ref 31.5–36.5)
MCV RBC AUTO: 94 FL (ref 78–100)
MONOCYTES # BLD AUTO: 1 10E9/L (ref 0–1.3)
MONOCYTES NFR BLD AUTO: 8.9 %
NEUTROPHILS # BLD AUTO: 6.4 10E9/L (ref 1.6–8.3)
NEUTROPHILS NFR BLD AUTO: 59.7 %
NT-PROBNP SERPL-MCNC: 399 PG/ML (ref 0–100)
PLATELET # BLD AUTO: 298 10E9/L (ref 150–450)
RBC # BLD AUTO: 4.71 10E12/L (ref 4.4–5.9)
WBC # BLD AUTO: 10.8 10E9/L (ref 4–11)

## 2019-12-03 PROCEDURE — 83690 ASSAY OF LIPASE: CPT | Mod: ZL | Performed by: FAMILY MEDICINE

## 2019-12-03 PROCEDURE — 71046 X-RAY EXAM CHEST 2 VIEWS: CPT

## 2019-12-03 PROCEDURE — 99215 OFFICE O/P EST HI 40 MIN: CPT | Performed by: FAMILY MEDICINE

## 2019-12-03 PROCEDURE — 83880 ASSAY OF NATRIURETIC PEPTIDE: CPT | Mod: ZL | Performed by: FAMILY MEDICINE

## 2019-12-03 PROCEDURE — 36415 COLL VENOUS BLD VENIPUNCTURE: CPT | Mod: ZL | Performed by: FAMILY MEDICINE

## 2019-12-03 PROCEDURE — 85025 COMPLETE CBC W/AUTO DIFF WBC: CPT | Mod: ZL | Performed by: FAMILY MEDICINE

## 2019-12-03 RX ORDER — LISINOPRIL 40 MG/1
40 TABLET ORAL DAILY
Qty: 90 TABLET | Refills: 3 | Status: SHIPPED | OUTPATIENT
Start: 2019-12-03 | End: 2020-01-02 | Stop reason: ALTCHOICE

## 2019-12-03 RX ORDER — FUROSEMIDE 20 MG
20 TABLET ORAL DAILY
Qty: 90 TABLET | Refills: 3 | Status: SHIPPED | OUTPATIENT
Start: 2019-12-03 | End: 2019-12-16

## 2019-12-03 ASSESSMENT — ENCOUNTER SYMPTOMS
WHEEZING: 0
FATIGUE: 1
SLEEP DISTURBANCE: 1
UNEXPECTED WEIGHT CHANGE: 0
POLYPHAGIA: 0
FEVER: 0
SHORTNESS OF BREATH: 1

## 2019-12-03 ASSESSMENT — PAIN SCALES - GENERAL: PAINLEVEL: SEVERE PAIN (7)

## 2019-12-03 NOTE — Clinical Note
Enzo Spence last saw you about 4 years ago.  CT shows perhaps recurrence of cancer.  Can you look at the scan and get back to me or him?  Thanks a ton.Mik Kumari MD on 12/10/2019 at 10:25 AM

## 2019-12-03 NOTE — PROGRESS NOTES
"  SUBJECTIVE:   Jaswant Chong is a 49 year old male who presents to clinic today for the following health issues:    HPI  Shortness of breath, hypertension.    With laying on his back he will fell more shortness of breath, have to breath faster and shallower.  Then sits up and it gets a little better. Has had this for several weeks, but getting worse.  Worse with laying on his right side too.  Has a \"ball\" in the right lower quadrant in and out with breathing too.  Known hernia in this area from his cancer surgeries.  Also has lost the ability to take in very deep breaths.  Has had about 1/2 of the left diaphragm removed.  Mild cough for several weeks.  Mild general fatigue.  No fevers.  Stopped his metformin due to headaches.  Not checking sugars at all.  Just got a home blood pressure monitor, was 167/120 yesterday.  Has no limit on his walking ability.  Was doing 20,000 steps over the fall with hunting.  Had abdomen CT scan 9/17, no cancer seen, only very large anterior abdomen hernia.      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     Priority: Medium     Recurrent ventral hernia 09/14/2015     Priority: Medium     Controlled substance agreement signed 08/13/2015     " Priority: Medium     Major depressive disorder, recurrent episode, moderate (H) 08/13/2015     Priority: Medium     Degenerative joint disease (DJD) of hip 02/06/2015     Priority: Medium     Hernia 12/03/2012     Priority: Medium     Abdominal pain 02/16/2012     Priority: Medium     Diabetes mellitus without complication (H) 12/07/2011     Priority: Medium     Ventral hernia with obstruction 12/06/2011     Priority: Medium     Varicocele 05/03/2011     Priority: Medium     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     EXCISE CYST GENERIC (LOCATION)      sebaceous, scalp     OTHER SURGICAL HISTORY      2007,[id number],LYMPH NODE DISSECTION     OTHER SURGICAL HISTORY      03/08,,HERNIA REPAIR,Repair of surgical wound hernia, metastatic cancer incidentally noted.     OTHER SURGICAL HISTORY      04/08,82967.9,HX ABDOMEN PERITONEUM OMENTUM SURGERY,removal of left hemidiaphragm and omentum [Other] as well as intraperitoneal chemotherapy, 5 FU and Oxaliplatin for metastatic mucinous adenocarcinoma of the appendix.[[[     OTHER SURGICAL HISTORY      04/09,,HERNIA REPAIR, Ventral hernia repair, recurrent appendiceal carcinoma, resection of 7 centimeter left upper quadrant tumor with splenectomy, Surgeon, Dr. Marks     OTHER SURGICAL HISTORY      11/2001,,HERNIA REPAIR,Reduction of ventral hernia and mesh repair of hernia     OTHER SURGICAL HISTORY      12/2012,,HERNIA REPAIR,Removal mesh with infection 12/12     OTHER SURGICAL HISTORY      4/25/13,825875,OTHER,left thumb, Dr. Donald     Social History     Tobacco Use     Smoking status: Never Smoker     Smokeless tobacco: Never Used   Substance Use Topics     Alcohol use: Yes      Alcohol/week: 0.0 standard drinks     Comment: Alcoholic Drinks/day: social     Current Outpatient Medications   Medication Sig Dispense Refill     BD VELIA U/F 32G X 4 MM insulin pen needle INJECTING THREE TIMES DAILY BEFORE MEALS 300 each 3     blood glucose monitoring (CONTOUR NEXT TEST) test strip Use to test blood sugar 2 times daily or as directed. 300 strip 3     blood glucose monitoring (COOL BLOOD GLUCOSE TEST STRIPS) test strip Use to test blood sugar 2 times daily or as directed. 200 each 3     Blood Glucose Monitoring Suppl (FIFTY50 GLUCOSE METER 2.0) w/Device KIT Check twice daily 1 kit 3     Blood Pressure KIT Blood pressure machine       insulin glargine (BASAGLAR KWIKPEN) 100 UNIT/ML pen Inject 20 Units Subcutaneous daily 30 mL 3     insulin lispro (HUMALOG PEN) 100 UNIT/ML pen Inject 5 Units Subcutaneous 3 times daily (before meals) May substitute Admelog 15 mL 3     insulin lispro (HUMALOG PEN) 100 UNIT/ML pen Inject 5 Units Subcutaneous 3 times daily (before meals) 15 mL 3     insulin pen needle (NOVOFINE) 32G X 6 MM miscellaneous Use 1 daily or as directed. 100 each prn     lancets 28G MISC Dispense item covered by pt ins. E11.9 NIDDM type II - Test 3 times/day, Reason: Unstable diabetes       liraglutide (VICTOZA PEN) 18 MG/3ML solution Inject 0.6 mg Subcutaneous daily 9 mL 3     lisinopril (PRINIVIL/ZESTRIL) 20 MG tablet Take 1 tablet (20 mg) by mouth daily 90 tablet 3     metFORMIN (GLUCOPHAGE) 1000 MG tablet Take 1 tablet (1,000 mg) by mouth 2 times daily (with meals) 180 tablet 3     metFORMIN (GLUCOPHAGE) 1000 MG tablet Take 1,000 mg by mouth 2 times daily (with meals)       oxyCODONE-acetaminophen (PERCOCET) 5-325 MG tablet Take 1 tablet by mouth every 4 hours as needed for pain 90 tablet 0     pravastatin (PRAVACHOL) 20 MG tablet Take 1 tablet (20 mg) by mouth At Bedtime 90 tablet 3     traMADol (ULTRAM) 50 MG tablet Take 1 tablet (50 mg) by mouth 4 times daily 90 tablet 5      traZODone (DESYREL) 50 MG tablet Take 1 tablet (50 mg) by mouth At Bedtime 90 tablet 3     No Known Allergies    Review of Systems   Constitutional: Positive for fatigue. Negative for fever and unexpected weight change.   Respiratory: Positive for shortness of breath. Negative for wheezing.    Cardiovascular: Negative for chest pain and peripheral edema.   Endocrine: Negative for polyphagia and polyuria.   Psychiatric/Behavioral: Positive for sleep disturbance.        OBJECTIVE:     BP (!) 154/92   Pulse 92   Temp 98.4  F (36.9  C) (Tympanic)   Resp 16   Wt 127 kg (280 lb)   SpO2 93%   BMI 35.00 kg/m    Body mass index is 35 kg/m .  Physical Exam  Constitutional:       Appearance: Normal appearance.   Cardiovascular:      Rate and Rhythm: Normal rate and regular rhythm.      Pulses: Normal pulses.      Heart sounds: No murmur. No friction rub. No gallop.    Pulmonary:      Effort: Pulmonary effort is normal.      Breath sounds: Normal breath sounds. No stridor. No rhonchi.   Abdominal:      General: Abdomen is flat. Bowel sounds are normal. There is distension.      Tenderness: There is abdominal tenderness.      Comments: Large diffuse hernia, moderately tender, worse in RLQ over hernia itself.   Neurological:      General: No focal deficit present.      Mental Status: He is alert and oriented to person, place, and time.   Psychiatric:         Mood and Affect: Mood normal.         Behavior: Behavior normal.         Thought Content: Thought content normal.         Diagnostic Test Results:  CXR - reviewed personally with radiology. Has mild right pleural effusion and lower lobe infiltrates with borderline cardiomegaly, consistent with mild heart failure.      ASSESSMENT/PLAN:         (J90) Pleural effusion on right  (primary encounter diagnosis)  Comment: Chest x ray would be most consistent with heart failure as the cause of this.  Had a negative angiogram about 1 year ago.  However, it is possible something  has progressed.  Will get an echo, labs pending.  Add on lasix while awaiting the work up.  Given cancer history, it is possible this is a malignant effusion, or even a post infectious effusion, but he has not really been ill with infections symptoms.  Plan: CBC and Differential, Brain Natriuretic Peptide        (BNP), Lipase, Echocardiogram Complete,         furosemide (LASIX) 20 MG tablet             (R06.02) Shortness of breath  Comment:    Plan: XR Chest 2 Views             (C18.1) Malignant neoplasm of appendix vermiformis (H)  Comment: affecting above  Plan:  progressive right lower quadrant pain, so repeat CT    (I10) HTN (hypertension), malignant  Comment: high today.   Plan: add on lasix, increase lisinopril to 40.      50 minutes with him, over half in coordination of care.    Mik Kumari MD  [facility]    Results for orders placed or performed in visit on 12/03/19   Lipase     Status: None   Result Value Ref Range    Lipase 35 11 - 82 U/L   Brain Natriuretic Peptide (BNP)     Status: Abnormal   Result Value Ref Range    N-Terminal Pro Bnp 399 (H) 0 - 100 pg/mL   CBC and Differential     Status: None   Result Value Ref Range    WBC 10.8 4.0 - 11.0 10e9/L    RBC Count 4.71 4.4 - 5.9 10e12/L    Hemoglobin 15.1 13.3 - 17.7 g/dL    Hematocrit 44.2 40.0 - 53.0 %    MCV 94 78 - 100 fl    MCH 32.1 26.5 - 33.0 pg    MCHC 34.2 31.5 - 36.5 g/dL    RDW 12.7 10.0 - 15.0 %    Platelet Count 298 150 - 450 10e9/L    Diff Method Automated Method     % Neutrophils 59.7 %    % Lymphocytes 30.2 %    % Monocytes 8.9 %    % Eosinophils 0.6 %    % Basophils 0.4 %    % Immature Granulocytes 0.2 %    Absolute Neutrophil 6.4 1.6 - 8.3 10e9/L    Absolute Lymphocytes 3.3 0.8 - 5.3 10e9/L    Absolute Monocytes 1.0 0.0 - 1.3 10e9/L    Absolute Eosinophils 0.1 0.0 - 0.7 10e9/L    Absolute Basophils 0.0 0.0 - 0.2 10e9/L    Abs Immature Granulocytes 0.0 0 - 0.4 10e9/L

## 2019-12-04 ENCOUNTER — TELEPHONE (OUTPATIENT)
Dept: FAMILY MEDICINE | Facility: OTHER | Age: 49
End: 2019-12-04

## 2019-12-04 ENCOUNTER — MYC MEDICAL ADVICE (OUTPATIENT)
Dept: FAMILY MEDICINE | Facility: OTHER | Age: 49
End: 2019-12-04

## 2019-12-09 ENCOUNTER — HOSPITAL ENCOUNTER (OUTPATIENT)
Dept: CT IMAGING | Facility: OTHER | Age: 49
Discharge: HOME | End: 2019-12-09
Attending: FAMILY MEDICINE | Admitting: FAMILY MEDICINE
Payer: COMMERCIAL

## 2019-12-09 ENCOUNTER — HOSPITAL ENCOUNTER (OUTPATIENT)
Dept: CARDIOLOGY | Facility: OTHER | Age: 49
End: 2019-12-09
Attending: FAMILY MEDICINE
Payer: COMMERCIAL

## 2019-12-09 DIAGNOSIS — C18.1 MALIGNANT NEOPLASM OF APPENDIX VERMIFORMIS (H): ICD-10-CM

## 2019-12-09 DIAGNOSIS — I10 HTN (HYPERTENSION), MALIGNANT: Primary | ICD-10-CM

## 2019-12-09 DIAGNOSIS — J90 PLEURAL EFFUSION ON RIGHT: ICD-10-CM

## 2019-12-09 PROCEDURE — 74177 CT ABD & PELVIS W/CONTRAST: CPT

## 2019-12-09 PROCEDURE — 25500064 ZZH RX 255 OP 636: Performed by: FAMILY MEDICINE

## 2019-12-09 PROCEDURE — 40000264 ECHOCARDIOGRAM COMPLETE

## 2019-12-09 PROCEDURE — 93306 TTE W/DOPPLER COMPLETE: CPT | Mod: 26 | Performed by: INTERNAL MEDICINE

## 2019-12-09 RX ORDER — CARVEDILOL 12.5 MG/1
12.5 TABLET ORAL 2 TIMES DAILY WITH MEALS
Qty: 180 TABLET | Refills: 3 | Status: SHIPPED | OUTPATIENT
Start: 2019-12-09 | End: 2019-12-16

## 2019-12-09 RX ORDER — IODIXANOL 320 MG/ML
100 INJECTION, SOLUTION INTRAVASCULAR ONCE
Status: COMPLETED | OUTPATIENT
Start: 2019-12-09 | End: 2019-12-09

## 2019-12-09 RX ADMIN — IODIXANOL 100 ML: 320 INJECTION, SOLUTION INTRAVASCULAR at 13:33

## 2019-12-09 RX ADMIN — PERFLUTREN 3 ML: 6.52 INJECTION, SUSPENSION INTRAVENOUS at 14:00

## 2019-12-09 NOTE — PROGRESS NOTES
IV Contrast- Discharge Instructions After Your CT Scan      The IV contrast you received today will be filtered from your bloodstream by your kidneys during the next 24 hours and pass from the body in urine.  You will not be aware of this process and your urine will not change in color.  To help this process you should drink at least 4 additional glasses of water or juice today.  This reduces stress on your kidneys.    Most contrast reactions are immediate.  Should you develop symptoms of concern after discharge, contact the department at the number below.  After hours you should contact your personal physician.  If you develop breathing distress or wheezing, call 911.      1.  Has the patient had a previous reaction to IV contrast? n    2.  Does the patient have kidney disease? n    3.  Is the patient on dialysis? n    If YES to any of these questions, exam will be reviewed with a Radiologist before administering contrast.  1.  Is patient currently taking metformin? y     If NO: Technologist will give the patient normal post CT instructions.     If YES: Technologist will obtain GFR from Lab.    2.  Is GFR is greater than 60? n     If YES: Technologist will give the patient normal post CT instructions.     If NO: Technologist will give the patient METFORMIN post CT instructions.

## 2019-12-10 ENCOUNTER — MYC MEDICAL ADVICE (OUTPATIENT)
Dept: FAMILY MEDICINE | Facility: OTHER | Age: 49
End: 2019-12-10

## 2019-12-10 DIAGNOSIS — I50.41 ACUTE COMBINED SYSTOLIC AND DIASTOLIC CONGESTIVE HEART FAILURE (H): Primary | ICD-10-CM

## 2019-12-12 ENCOUNTER — MYC MEDICAL ADVICE (OUTPATIENT)
Dept: FAMILY MEDICINE | Facility: OTHER | Age: 49
End: 2019-12-12

## 2019-12-16 ENCOUNTER — HEALTH MAINTENANCE LETTER (OUTPATIENT)
Age: 49
End: 2019-12-16

## 2019-12-16 DIAGNOSIS — I10 HTN (HYPERTENSION), MALIGNANT: Primary | ICD-10-CM

## 2019-12-16 RX ORDER — CARVEDILOL 25 MG/1
25 TABLET ORAL 2 TIMES DAILY WITH MEALS
Qty: 180 TABLET | Refills: 3 | Status: SHIPPED | OUTPATIENT
Start: 2019-12-16 | End: 2020-12-17

## 2019-12-16 RX ORDER — FUROSEMIDE 40 MG
40 TABLET ORAL DAILY
Qty: 90 TABLET | Refills: 3 | Status: SHIPPED | OUTPATIENT
Start: 2019-12-16 | End: 2020-12-17

## 2019-12-17 ENCOUNTER — MYC MEDICAL ADVICE (OUTPATIENT)
Dept: FAMILY MEDICINE | Facility: OTHER | Age: 49
End: 2019-12-17

## 2019-12-17 DIAGNOSIS — C18.1 MALIGNANT NEOPLASM OF APPENDIX VERMIFORMIS (H): Primary | ICD-10-CM

## 2019-12-17 DIAGNOSIS — I50.41 ACUTE COMBINED SYSTOLIC AND DIASTOLIC CONGESTIVE HEART FAILURE (H): Primary | ICD-10-CM

## 2019-12-17 RX ORDER — SPIRONOLACTONE 50 MG/1
50 TABLET, FILM COATED ORAL DAILY
Qty: 90 TABLET | Refills: 3 | Status: SHIPPED | OUTPATIENT
Start: 2019-12-17 | End: 2020-03-23

## 2019-12-18 ENCOUNTER — TELEPHONE (OUTPATIENT)
Dept: SURGERY | Facility: CLINIC | Age: 49
End: 2019-12-18

## 2019-12-18 NOTE — TELEPHONE ENCOUNTER
Called patient. Left  with hours and phone. Letter sent for follow up. Referral in [facility].

## 2019-12-19 NOTE — TELEPHONE ENCOUNTER
Action    Action Taken December 19, 2019  After review of chart, verified all records in Epic

## 2020-01-02 ENCOUNTER — OFFICE VISIT (OUTPATIENT)
Dept: CARDIOLOGY | Facility: OTHER | Age: 50
End: 2020-01-02
Attending: FAMILY MEDICINE
Payer: COMMERCIAL

## 2020-01-02 VITALS
OXYGEN SATURATION: 95 % | HEIGHT: 74 IN | HEART RATE: 60 BPM | BODY MASS INDEX: 36.7 KG/M2 | TEMPERATURE: 98.3 F | DIASTOLIC BLOOD PRESSURE: 86 MMHG | RESPIRATION RATE: 18 BRPM | WEIGHT: 286 LBS | SYSTOLIC BLOOD PRESSURE: 136 MMHG

## 2020-01-02 DIAGNOSIS — I71.21 ASCENDING AORTIC ANEURYSM (H): ICD-10-CM

## 2020-01-02 DIAGNOSIS — E78.2 MIXED HYPERLIPIDEMIA: ICD-10-CM

## 2020-01-02 DIAGNOSIS — I50.42 CHRONIC COMBINED SYSTOLIC AND DIASTOLIC CONGESTIVE HEART FAILURE (H): Primary | ICD-10-CM

## 2020-01-02 DIAGNOSIS — R06.09 DYSPNEA ON EXERTION: ICD-10-CM

## 2020-01-02 DIAGNOSIS — Z98.890 STATUS POST CORONARY ANGIOGRAM: ICD-10-CM

## 2020-01-02 DIAGNOSIS — K76.0 FATTY LIVER: ICD-10-CM

## 2020-01-02 DIAGNOSIS — G47.33 OSA (OBSTRUCTIVE SLEEP APNEA): ICD-10-CM

## 2020-01-02 DIAGNOSIS — I10 HTN (HYPERTENSION), MALIGNANT: ICD-10-CM

## 2020-01-02 DIAGNOSIS — E11.9 DIABETES MELLITUS WITHOUT COMPLICATION (H): ICD-10-CM

## 2020-01-02 DIAGNOSIS — E27.8 ADRENAL MASS, RIGHT (H): ICD-10-CM

## 2020-01-02 DIAGNOSIS — E66.01 MORBID OBESITY (H): ICD-10-CM

## 2020-01-02 LAB
ALBUMIN SERPL-MCNC: 4.2 G/DL (ref 3.5–5.7)
ALP SERPL-CCNC: 75 U/L (ref 34–104)
ALT SERPL W P-5'-P-CCNC: 17 U/L (ref 7–52)
ANION GAP SERPL CALCULATED.3IONS-SCNC: 8 MMOL/L (ref 3–14)
AST SERPL W P-5'-P-CCNC: 14 U/L (ref 13–39)
BILIRUB SERPL-MCNC: 0.7 MG/DL (ref 0.3–1)
BUN SERPL-MCNC: 17 MG/DL (ref 7–25)
CALCIUM SERPL-MCNC: 9.5 MG/DL (ref 8.6–10.3)
CHLORIDE SERPL-SCNC: 95 MMOL/L (ref 98–107)
CHOLEST SERPL-MCNC: 174 MG/DL
CO2 SERPL-SCNC: 30 MMOL/L (ref 21–31)
CREAT SERPL-MCNC: 1.02 MG/DL (ref 0.7–1.3)
ERYTHROCYTE [DISTWIDTH] IN BLOOD BY AUTOMATED COUNT: 12.3 % (ref 10–15)
GFR SERPL CREATININE-BSD FRML MDRD: 78 ML/MIN/{1.73_M2}
GLUCOSE SERPL-MCNC: 328 MG/DL (ref 70–105)
HCT VFR BLD AUTO: 48.9 % (ref 40–53)
HDLC SERPL-MCNC: 36 MG/DL (ref 23–92)
HGB BLD-MCNC: 16.6 G/DL (ref 13.3–17.7)
LDLC SERPL CALC-MCNC: 106 MG/DL
MCH RBC QN AUTO: 31.3 PG (ref 26.5–33)
MCHC RBC AUTO-ENTMCNC: 33.9 G/DL (ref 31.5–36.5)
MCV RBC AUTO: 92 FL (ref 78–100)
NONHDLC SERPL-MCNC: 138 MG/DL
NT-PROBNP SERPL-MCNC: 194 PG/ML (ref 0–100)
PLATELET # BLD AUTO: 263 10E9/L (ref 150–450)
POTASSIUM SERPL-SCNC: 4 MMOL/L (ref 3.5–5.1)
PROT SERPL-MCNC: 7.6 G/DL (ref 6.4–8.9)
RBC # BLD AUTO: 5.31 10E12/L (ref 4.4–5.9)
SODIUM SERPL-SCNC: 133 MMOL/L (ref 134–144)
TRIGL SERPL-MCNC: 158 MG/DL
TSH SERPL DL<=0.05 MIU/L-ACNC: 0.4 IU/ML (ref 0.34–5.6)
WBC # BLD AUTO: 8 10E9/L (ref 4–11)

## 2020-01-02 PROCEDURE — 80061 LIPID PANEL: CPT | Mod: ZL | Performed by: INTERNAL MEDICINE

## 2020-01-02 PROCEDURE — 93000 ELECTROCARDIOGRAM COMPLETE: CPT | Performed by: INTERNAL MEDICINE

## 2020-01-02 PROCEDURE — 99204 OFFICE O/P NEW MOD 45 MIN: CPT | Performed by: INTERNAL MEDICINE

## 2020-01-02 PROCEDURE — 83880 ASSAY OF NATRIURETIC PEPTIDE: CPT | Mod: ZL | Performed by: INTERNAL MEDICINE

## 2020-01-02 PROCEDURE — 82088 ASSAY OF ALDOSTERONE: CPT | Mod: ZL | Performed by: INTERNAL MEDICINE

## 2020-01-02 PROCEDURE — 84244 ASSAY OF RENIN: CPT | Mod: ZL | Performed by: INTERNAL MEDICINE

## 2020-01-02 PROCEDURE — 84443 ASSAY THYROID STIM HORMONE: CPT | Mod: ZL | Performed by: INTERNAL MEDICINE

## 2020-01-02 PROCEDURE — 36415 COLL VENOUS BLD VENIPUNCTURE: CPT | Mod: ZL | Performed by: INTERNAL MEDICINE

## 2020-01-02 PROCEDURE — 80053 COMPREHEN METABOLIC PANEL: CPT | Mod: ZL | Performed by: INTERNAL MEDICINE

## 2020-01-02 PROCEDURE — 85027 COMPLETE CBC AUTOMATED: CPT | Mod: ZL | Performed by: INTERNAL MEDICINE

## 2020-01-02 RX ORDER — ASPIRIN 81 MG/1
81 TABLET, CHEWABLE ORAL DAILY
COMMUNITY

## 2020-01-02 RX ORDER — ROSUVASTATIN CALCIUM 20 MG/1
20 TABLET, COATED ORAL DAILY
Qty: 90 TABLET | Refills: 3 | Status: SHIPPED | OUTPATIENT
Start: 2020-01-02 | End: 2020-12-17

## 2020-01-02 ASSESSMENT — MIFFLIN-ST. JEOR: SCORE: 2232.29

## 2020-01-02 ASSESSMENT — PAIN SCALES - GENERAL: PAINLEVEL: SEVERE PAIN (6)

## 2020-01-02 NOTE — NURSING NOTE
"Patient comes in for annual follow up.  Sheyla Bullard LPN ....................1/2/2020   9:05 AM  Chief Complaint   Patient presents with     Follow Up     ANNUAL       Initial /86 (BP Location: Right arm, Patient Position: Sitting, Cuff Size: Adult Large)   Pulse 60   Temp 98.3  F (36.8  C) (Tympanic)   Resp 18   Ht 1.88 m (6' 2.02\")   Wt 129.7 kg (286 lb)   SpO2 95%   BMI 36.70 kg/m   Estimated body mass index is 36.7 kg/m  as calculated from the following:    Height as of this encounter: 1.88 m (6' 2.02\").    Weight as of this encounter: 129.7 kg (286 lb).  Meds Reconciled: complete  Pt is on Aspirin  Pt is on a Statin  PHQ and/or MESFIN reviewed. Pt referred to PCP/MH Provider as appropriate.    Sheyla Bullard LPN      "

## 2020-01-02 NOTE — PROGRESS NOTES
Cardiology Progress Note     Assessment & Plan   Jaswant Chong is a 49 year old male who is being seen in follow-up to visit from 7/23/2018.  He had previously seen Kira Jang CNP.     Patient was seen by his PCP on 7/6/18 for left upper chest discomfort for past two years. Increased concern as the patients father underwent a 4V CABG. Reports his discomfort with heavy lifting and when hunting at elevation. 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 Reported symptoms occurring approximately 2 times per week.      Given his significant risk factors for CAD, as NM MPI Lexiscan stress test was ordered. This study was completed on 7/19/18. Patient was found to have dilated LV, mild hypokinesis of the cardiac apex with EF at 55% which is mildly reduced with stress. Questionable foal fixed perfusion abnormality noted anteriorly and laterally in the LCX distribution. Patient remained asymptomatic throughout testing, he did develop inferior T-wave inversion after lexiscan administered which did resolve during testing. Vitals remained stable.      Patient has a history of uncontrolled type II DM. Had not been checking his blood glucose levels, he was on insulin management with fixed scheduled dosing and Victoza. Reported that he had been off of his medications and had recently started back on long acting Insulin and Lisinopril. Hgb A1C 13.6% on 5/30//18.     Patient with history of HLD, currently on Pravastatin. Consider high intensity statin with chronic DM II. Lipid panel 5/30/18 with total cholesterol 235, triglycerides 318,  and HDL 31.      Patient with a history of malignant HTN. He was recently started on Amlodipine 10 mg daily. Consideration for beta blocker with likely CAD.     Stress test on 7/19/2018 showed an EF 55% with focal hypokinesis at the cardiac apex.  There was a questionable  fixed perfusion abnormality anteriorly and laterally involving the left circumflex distribution.    He underwent cardiac catheterization on 7/26/2018.  Left main patent, LAD with mild luminal irregularities, circumflex patent, and RCA with luminal irregularities.     He was seen in clinic on 12/3/2019.  He was complaining of shortness of breath with faster and shallow breathing.  This is been going on for several weeks most notably during hunting season.  He has been describing orthopnea.  Chest x-ray showed pleural effusions consistent with heart failure.  He has been on Lasix 40 mg daily.  He was set up for an echocardiogram.    He had an echo completed on 12/9/2019 with an EF of 40 to 45% with severe left ventricular dilatation and grade 3 diastolic dysfunction.  His a sending aorta is mildly enlarged at 4.0 cm.    I suspect his heart failure is secondary to severely uncontrolled diabetes.  His most recent A1c was 12.5% 11/8/2019.  He has not been checking his sugars.  Complicated by obesity with a weight of 286 pounds on 1/2/2020.  His blood pressure is also been severely uncontrolled.  His blood pressure has been ranging from 130's to 170's in the chart.  At one point his blood pressure was in the 180's to 200's.    Impression:  1.  Systolic heart failure with an EF of 40 to 45% and diastolic dysfunction grade 1 which I suspect is secondary to obesity, uncontrolled diabetes, uncontrolled hypertension, hyperlipidemia, and presumably YONG.  2.  Presumptive YONG.  3.  Hypertensive urgency.  4.  H/O via cath in 7/26/2018 with luminal irregularities.  5.  DM-2  6.  Hyperlipidemia-uncontrolled.  7.  Fatty liver  8.  Morbid obesity  9.  Right adrenal mass.  10.  Dyspnea on exertion.  11.  Medical noncompliance.  12.  H/O low-grade endocarcinoma of the appendix.     Plan:  1.  Related to heart failure, will continue Coreg 25 mg twice a day, Lasix 40 mg daily, and spironolactone 50 mg daily.  2.  Labs today which will  include an aldosterone/renin ratio, CBC, CMP, lipid panel, BNP, and TSH.  3.  Stop lisinopril 100 mg daily.  4.  48 hours later after stopping lisinopril, start Entresto 49/51 twice a day.  5.  Stop pravastatin 20 mg daily.  6.  Start Crestor 20 mg daily secondary to history of minimal coronary disease and elevated lipid panel.  7.  I suggested he control his diabetes better.  He is to cut out carbohydrates and sugar.  8.  Salt restriction of 2500 mg, fluid restriction of 2 L, and daily weights.  9.  Increase his activity, push for weight loss and caloric restriction.  10.  We discussed the 6 conservative measures for blood pressure control.  11.  Consider evaluation for YONG in the future.  It sounds like he previously was a heavy snorer.  12.  Echo in 3 months now that he is on guideline directed therapy.  13.  Follow-up in 3 months after completion of echo.              Mello Maurer        Physical Exam   Temp: 98.3  F (36.8  C) Temp src: Tympanic BP: 136/86 Pulse: 60   Resp: 18 SpO2: 95 %      Vitals:    01/02/20 0901   Weight: 129.7 kg (286 lb)     Vital Signs with Ranges  Temp:  [98.3  F (36.8  C)] 98.3  F (36.8  C)  Pulse:  [60] 60  Resp:  [18] 18  BP: (136)/(86) 136/86  SpO2:  [95 %] 95 %  ROS negative except that which was noted in HPI.    Peripheral IV 07/26/18 Right;Anterior Upper forearm (Active)   Number of days: 525       Constitutional: awake, alert, cooperative, no apparent distress, and appears stated age.  Eyes: Lids and lashes normal, pupils equal, sclera clear, conjunctiva normal.  ENT: Normocephalic, without obvious abnormality, atraumatic.  Respiratory: No increased work of breathing, good air exchange, clear to auscultation bilaterally, no crackles or wheezing  Cardiovascular: Normal apical impulse, regular rate and rhythm, normal S1 and S2, no S3 or S4, and no murmur noted  Musculoskeletal: +1-2/4 lower extremity pitting edema present  Neurologic: Awake, alert.  Neuropsychiatric: General:  normal, calm and normal eye contact    Medications         Data   Results for orders placed or performed in visit on 01/02/20 (from the past 24 hour(s))   N terminal pro BNP outpatient   Result Value Ref Range    N-Terminal Pro Bnp 194 (H) 0 - 100 pg/mL   Thyrotropin GH   Result Value Ref Range    Thyrotropin 0.40 0.34 - 5.60 IU/mL   CBC with platelets   Result Value Ref Range    WBC 8.0 4.0 - 11.0 10e9/L    RBC Count 5.31 4.4 - 5.9 10e12/L    Hemoglobin 16.6 13.3 - 17.7 g/dL    Hematocrit 48.9 40.0 - 53.0 %    MCV 92 78 - 100 fl    MCH 31.3 26.5 - 33.0 pg    MCHC 33.9 31.5 - 36.5 g/dL    RDW 12.3 10.0 - 15.0 %    Platelet Count 263 150 - 450 10e9/L   Comprehensive metabolic panel   Result Value Ref Range    Sodium 133 (L) 134 - 144 mmol/L    Potassium 4.0 3.5 - 5.1 mmol/L    Chloride 95 (L) 98 - 107 mmol/L    Carbon Dioxide 30 21 - 31 mmol/L    Anion Gap 8 3 - 14 mmol/L    Glucose 328 (H) 70 - 105 mg/dL    Urea Nitrogen 17 7 - 25 mg/dL    Creatinine 1.02 0.70 - 1.30 mg/dL    GFR Estimate 78 >60 mL/min/[1.73_m2]    GFR Estimate If Black >90 >60 mL/min/[1.73_m2]    Calcium 9.5 8.6 - 10.3 mg/dL    Bilirubin Total 0.7 0.3 - 1.0 mg/dL    Albumin 4.2 3.5 - 5.7 g/dL    Protein Total 7.6 6.4 - 8.9 g/dL    Alkaline Phosphatase 75 34 - 104 U/L    ALT 17 7 - 52 U/L    AST 14 13 - 39 U/L   Lipid Profile   Result Value Ref Range    Cholesterol 174 <200 mg/dL    Triglycerides 158 (H) <150 mg/dL    HDL Cholesterol 36 23 - 92 mg/dL    LDL Cholesterol Calculated 106 (H) <100 mg/dL    Non HDL Cholesterol 138 (H) <130 mg/dL     No results found for this or any previous visit (from the past 24 hour(s)).

## 2020-01-03 LAB — ALDOST SERPL-MCNC: 17.2 NG/DL (ref 0–31)

## 2020-01-06 LAB — RENIN PLAS-CCNC: 0.3 NG/ML/HR

## 2020-01-08 ASSESSMENT — ENCOUNTER SYMPTOMS
BLOOD IN STOOL: 0
LEG PAIN: 0
FEVER: 0
ALTERED TEMPERATURE REGULATION: 0
SLEEP DISTURBANCES DUE TO BREATHING: 1
WEIGHT LOSS: 0
DECREASED APPETITE: 1
WEIGHT GAIN: 0
VOMITING: 0
HALLUCINATIONS: 0
CHILLS: 0
POLYDIPSIA: 1
INCREASED ENERGY: 0
JAUNDICE: 0
PALPITATIONS: 0
HYPERTENSION: 1
EXERCISE INTOLERANCE: 0
FATIGUE: 1
NIGHT SWEATS: 1
HYPOTENSION: 0
HEARTBURN: 0
BOWEL INCONTINENCE: 0
ABDOMINAL PAIN: 1
CONSTIPATION: 0
BLOATING: 0
NAUSEA: 0
DIARRHEA: 0
SYNCOPE: 0
RECTAL PAIN: 0
LIGHT-HEADEDNESS: 0
ORTHOPNEA: 1

## 2020-01-09 ENCOUNTER — MYC REFILL (OUTPATIENT)
Dept: FAMILY MEDICINE | Facility: OTHER | Age: 50
End: 2020-01-09

## 2020-01-09 DIAGNOSIS — E11.9 DIABETES MELLITUS WITHOUT COMPLICATION (H): ICD-10-CM

## 2020-01-09 DIAGNOSIS — C18.1 MALIGNANT NEOPLASM OF APPENDIX VERMIFORMIS (H): ICD-10-CM

## 2020-01-09 RX ORDER — PRAVASTATIN SODIUM 20 MG
TABLET ORAL
Qty: 90 TABLET | Refills: 3 | OUTPATIENT
Start: 2020-01-09

## 2020-01-09 NOTE — TELEPHONE ENCOUNTER
"Routing refill request to provider for review/approval because:  Drug not active on patient's medication list    LOV; 1/2/2020  Per OV note   \"5.  Stop pravastatin 20 mg daily.\"  Samantha Olmos RN on 1/9/2020 at 1:15 PM          "

## 2020-01-10 RX ORDER — INSULIN GLARGINE 100 [IU]/ML
20 INJECTION, SOLUTION SUBCUTANEOUS DAILY
Qty: 30 ML | Refills: 3 | Status: SHIPPED | OUTPATIENT
Start: 2020-01-10 | End: 2020-12-17

## 2020-01-10 RX ORDER — TRAMADOL HYDROCHLORIDE 50 MG/1
50 TABLET ORAL 4 TIMES DAILY
Qty: 90 TABLET | Refills: 5 | Status: SHIPPED | OUTPATIENT
Start: 2020-01-10 | End: 2020-04-09

## 2020-01-13 ENCOUNTER — PRE VISIT (OUTPATIENT)
Dept: ONCOLOGY | Facility: CLINIC | Age: 50
End: 2020-01-13

## 2020-01-13 ENCOUNTER — ONCOLOGY VISIT (OUTPATIENT)
Dept: ONCOLOGY | Facility: CLINIC | Age: 50
End: 2020-01-13
Attending: FAMILY MEDICINE
Payer: COMMERCIAL

## 2020-01-13 VITALS
OXYGEN SATURATION: 97 % | DIASTOLIC BLOOD PRESSURE: 89 MMHG | HEART RATE: 60 BPM | SYSTOLIC BLOOD PRESSURE: 152 MMHG | WEIGHT: 288.7 LBS | BODY MASS INDEX: 37.05 KG/M2 | HEIGHT: 74 IN | RESPIRATION RATE: 18 BRPM | TEMPERATURE: 98.6 F

## 2020-01-13 DIAGNOSIS — C18.1 MALIGNANT NEOPLASM OF APPENDIX VERMIFORMIS (H): ICD-10-CM

## 2020-01-13 PROCEDURE — G0463 HOSPITAL OUTPT CLINIC VISIT: HCPCS | Mod: ZF

## 2020-01-13 ASSESSMENT — MIFFLIN-ST. JEOR: SCORE: 2244.6

## 2020-01-13 ASSESSMENT — PAIN SCALES - GENERAL: PAINLEVEL: SEVERE PAIN (6)

## 2020-01-13 NOTE — NURSING NOTE
"Oncology Rooming Note    January 13, 2020 4:10 PM   Jaswant Chong is a 49 year old male who presents for:    Chief Complaint   Patient presents with     New Patient     NEW PT; MALIGNANT NEOPLASM OF APPENDIX VERMIFORMIS; VITALS TAKEN      Initial Vitals: BP (!) 152/89   Pulse 60   Temp 98.6  F (37  C) (Oral)   Resp 18   Ht 1.88 m (6' 2.02\")   Wt 131 kg (288 lb 11.2 oz)   SpO2 97%   BMI 37.05 kg/m   Estimated body mass index is 37.05 kg/m  as calculated from the following:    Height as of this encounter: 1.88 m (6' 2.02\").    Weight as of this encounter: 131 kg (288 lb 11.2 oz). Body surface area is 2.62 meters squared.  Severe Pain (6) Comment: Data Unavailable   No LMP for male patient.  Allergies reviewed: Yes  Medications reviewed: Yes    Medications: Medication refills not needed today.  Pharmacy name entered into [employer]:    [facility] - [place], [place] - [address] [id number] - [place], [place] - [address] [facility] [address]  [id number] - [place], [place] - [address] AT [facility] [address]    Clinical concerns: No new concerns today  Dr Marks  was notified.      [address]              "

## 2020-01-13 NOTE — PROGRESS NOTES
Jaswant Chong is here for a followup visit.  In 2008, I performed a cytoreductive surgery and hyperthermic intraperitoneal chemotherapy for low-grade mucinous neoplasm of the appendix.  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  Since that time, he has done well and has had no evidence of recurrence.  He recently had a CT scan which demonstrated a 4.7 cm mass near his sigmoid colon.  There were no other suspicious findings.  In reviewing his CT scans going back to 2011, this has been present since that time.  In 2011, it was approximately 3.3 cm. In 2014, it was approximately 4.1-4.2 cm.  He is asymptomatic.  He still has a very large abdominal hernia that causes him problems.  He has not had a colonoscopy since his cancer diagnosis.  He did have an extensive cardiac workup and was found to have congestive heart failure.      IMPRESSION:  Low-grade mucinous neoplasm of the appendix.      PLAN:  I told him that this lesion appears stable or minimal growth in the last 8 years.  I think it is probable that this CT finding represents a hardened, mucinous nodule related to his appendiceal neoplasm.  I told him I did not think the benefits of reoperating on him now outweigh the potential risks, particularly given his recent diagnosis of heart failure.  He asked about whether or not his hernia should be repaired. I told him I did not know that, but I would be glad to have him see one of our hernia surgeons.  He did see someone here 4 years ago and did not have the surgery done. Because this nodule has increased slightly since his last scans, I did recommend repeating a CT scan in about a year.      TT: 30 minutes. CT: 30 minutes.      cc:     Mik Kumari MD   [address] [place] [address]

## 2020-01-16 ENCOUNTER — MYC MEDICAL ADVICE (OUTPATIENT)
Dept: FAMILY MEDICINE | Facility: OTHER | Age: 50
End: 2020-01-16

## 2020-01-23 ENCOUNTER — MYC MEDICAL ADVICE (OUTPATIENT)
Dept: FAMILY MEDICINE | Facility: OTHER | Age: 50
End: 2020-01-23

## 2020-03-22 ENCOUNTER — MYC MEDICAL ADVICE (OUTPATIENT)
Dept: CARDIOLOGY | Facility: OTHER | Age: 50
End: 2020-03-22

## 2020-03-22 DIAGNOSIS — I10 HTN (HYPERTENSION), MALIGNANT: Primary | ICD-10-CM

## 2020-03-22 DIAGNOSIS — I50.42 CHRONIC COMBINED SYSTOLIC AND DIASTOLIC CONGESTIVE HEART FAILURE (H): ICD-10-CM

## 2020-03-22 DIAGNOSIS — I10 ESSENTIAL HYPERTENSION: ICD-10-CM

## 2020-03-23 PROBLEM — I10 ESSENTIAL HYPERTENSION: Status: ACTIVE | Noted: 2020-03-23

## 2020-03-23 RX ORDER — SPIRONOLACTONE 25 MG/1
25 TABLET ORAL DAILY
Qty: 90 TABLET | Refills: 3 | Status: SHIPPED | OUTPATIENT
Start: 2020-03-23 | End: 2020-12-17

## 2020-04-09 ENCOUNTER — VIRTUAL VISIT (OUTPATIENT)
Dept: FAMILY MEDICINE | Facility: OTHER | Age: 50
End: 2020-04-09
Attending: FAMILY MEDICINE
Payer: COMMERCIAL

## 2020-04-09 VITALS — SYSTOLIC BLOOD PRESSURE: 110 MMHG | DIASTOLIC BLOOD PRESSURE: 80 MMHG | HEART RATE: 71 BPM

## 2020-04-09 DIAGNOSIS — C18.1 MALIGNANT NEOPLASM OF APPENDIX VERMIFORMIS (H): ICD-10-CM

## 2020-04-09 PROCEDURE — 99212 OFFICE O/P EST SF 10 MIN: CPT | Mod: 95 | Performed by: FAMILY MEDICINE

## 2020-04-09 RX ORDER — LISINOPRIL 40 MG/1
1 TABLET ORAL DAILY
COMMUNITY
Start: 2020-03-12 | End: 2020-12-17

## 2020-04-09 RX ORDER — OXYCODONE AND ACETAMINOPHEN 5; 325 MG/1; MG/1
1 TABLET ORAL EVERY 4 HOURS PRN
Qty: 90 TABLET | Refills: 0 | Status: SHIPPED | OUTPATIENT
Start: 2020-04-09 | End: 2020-08-14

## 2020-04-09 RX ORDER — TRAMADOL HYDROCHLORIDE 50 MG/1
50 TABLET ORAL 4 TIMES DAILY
Qty: 90 TABLET | Refills: 5 | Status: SHIPPED | OUTPATIENT
Start: 2020-04-09 | End: 2020-08-14

## 2020-04-09 ASSESSMENT — PAIN SCALES - GENERAL: PAINLEVEL: SEVERE PAIN (6)

## 2020-04-09 NOTE — PROGRESS NOTES
"Ariane Chong is a 49 year old male who is being evaluated via a billable telephone visit.      The patient has been notified of following:     \"This telephone visit will be conducted via a call between you and your physician/provider. We have found that certain health care needs can be provided without the need for a physical exam.  This service lets us provide the care you need with a short phone conversation.  If a prescription is necessary we can send it directly to your pharmacy.  If lab work is needed we can place an order for that and you can then stop by our lab to have the test done at a later time.    Telephone visits are billed at different rates depending on your insurance coverage. During this emergency period, for some insurers they may be billed the same as an in-person visit.  Please reach out to your insurance provider with any questions.    If during the course of the call the physician/provider feels a telephone visit is not appropriate, you will not be charged for this service.\"    Patient has given verbal consent for Telephone visit?  Yes    How would you like to obtain your AVS? Cholot    Jaswant Chong complains of   Chief Complaint   Patient presents with     Medication Request     Follow up on knee pain form djd as well as his ventral hernia from his cancer surgery.  He had a new mass on his large bowel, met with his surgeon at the [facility], who advised just serial exams.    Has been more active lately and down in weight.      ALLERGIES  Patient has no known allergies.               Current Outpatient Medications   Medication Sig Dispense Refill     aspirin (ASA) 81 MG chewable tablet Take 81 mg by mouth daily       BD VELIA U/F 32G X 4 MM insulin pen needle INJECTING THREE TIMES DAILY BEFORE MEALS 300 each 3     blood glucose monitoring (CONTOUR NEXT TEST) test strip Use to test blood sugar 2 times daily or as directed. 300 strip 3     blood glucose monitoring (COOL BLOOD " GLUCOSE TEST STRIPS) test strip Use to test blood sugar 2 times daily or as directed. 200 each 3     Blood Glucose Monitoring Suppl (FIFTY50 GLUCOSE METER 2.0) w/Device KIT Check twice daily 1 kit 3     Blood Pressure KIT Blood pressure machine       carvedilol (COREG) 25 MG tablet Take 1 tablet (25 mg) by mouth 2 times daily (with meals) 180 tablet 3     furosemide (LASIX) 40 MG tablet Take 1 tablet (40 mg) by mouth daily 90 tablet 3     insulin glargine (BASAGLAR KWIKPEN) 100 UNIT/ML pen Inject 20 Units Subcutaneous daily 30 mL 3     lancets 28G MISC Dispense item covered by pt ins. E11.9 NIDDM type II - Test 3 times/day, Reason: Unstable diabetes       oxyCODONE-acetaminophen (PERCOCET) 5-325 MG tablet Take 1 tablet by mouth every 4 hours as needed for pain 90 tablet 0     rosuvastatin (CRESTOR) 20 MG tablet Take 1 tablet (20 mg) by mouth daily 90 tablet 3     sacubitril-valsartan (ENTRESTO) 49-51 MG per tablet Take 1 tablet by mouth 2 times daily 180 tablet 3     spironolactone (ALDACTONE) 25 MG tablet Take 1 tablet (25 mg) by mouth daily 90 tablet 3     traMADol (ULTRAM) 50 MG tablet Take 1 tablet (50 mg) by mouth 4 times daily 90 tablet 5     traZODone (DESYREL) 50 MG tablet Take 1 tablet (50 mg) by mouth At Bedtime 90 tablet 3     lisinopril (ZESTRIL) 40 MG tablet Take 1 tablet by mouth daily       metFORMIN (GLUCOPHAGE) 1000 MG tablet Take 1 tablet by mouth 2 times daily (with meals)       No Known Allergies    Reviewed and updated as needed this visit by Provider         Review of Systems   ROS COMP:         Objective   Reported vitals:  /80   Pulse 71    healthy, alert and no distress  Psych: Alert and oriented times 3; coherent speech, normal   rate and volume, able to articulate logical thoughts, able   to abstract reason, no tangential thoughts, no hallucinations   or delusions  His affect is normal and full      Diagnostic Test Results:  Labs reviewed in Epic        Assessment/Plan:  1.  Malignant neoplasm of appendix vermiformis (H)  meds refilled.   reviewed but this is a cancer related pain.  - oxyCODONE-acetaminophen (PERCOCET) 5-325 MG tablet; Take 1 tablet by mouth every 4 hours as needed for pain  Dispense: 90 tablet; Refill: 0  - traMADol (ULTRAM) 50 MG tablet; Take 1 tablet (50 mg) by mouth 4 times daily  Dispense: 90 tablet; Refill: 5    Return in about 3 months (around 7/9/2020), or if symptoms worsen or fail to improve.      Phone call duration:  11 minutes    Mik Kumari MD

## 2020-08-13 ENCOUNTER — TELEPHONE (OUTPATIENT)
Dept: FAMILY MEDICINE | Facility: OTHER | Age: 50
End: 2020-08-13

## 2020-08-13 NOTE — TELEPHONE ENCOUNTER
Patient is requesting an appointment tomorrow morning to get refills of tramadol and percocet. He will be here with his wife, she has appointment's around [id number].     Marika Hill LPN.................. 8/13/2020 3:24 PM

## 2020-08-14 ENCOUNTER — OFFICE VISIT (OUTPATIENT)
Dept: FAMILY MEDICINE | Facility: OTHER | Age: 50
End: 2020-08-14
Attending: FAMILY MEDICINE
Payer: COMMERCIAL

## 2020-08-14 VITALS
BODY MASS INDEX: 36.7 KG/M2 | DIASTOLIC BLOOD PRESSURE: 88 MMHG | RESPIRATION RATE: 16 BRPM | SYSTOLIC BLOOD PRESSURE: 136 MMHG | TEMPERATURE: 98.6 F | WEIGHT: 286 LBS

## 2020-08-14 DIAGNOSIS — E11.9 DIABETES MELLITUS WITHOUT COMPLICATION (H): Primary | ICD-10-CM

## 2020-08-14 DIAGNOSIS — C18.1 MALIGNANT NEOPLASM OF APPENDIX VERMIFORMIS (H): ICD-10-CM

## 2020-08-14 LAB
ANION GAP SERPL CALCULATED.3IONS-SCNC: 7 MMOL/L (ref 3–14)
BUN SERPL-MCNC: 16 MG/DL (ref 7–25)
CALCIUM SERPL-MCNC: 9.2 MG/DL (ref 8.6–10.3)
CHLORIDE SERPL-SCNC: 97 MMOL/L (ref 98–107)
CHOLEST SERPL-MCNC: 220 MG/DL
CO2 SERPL-SCNC: 27 MMOL/L (ref 21–31)
CREAT SERPL-MCNC: 1.05 MG/DL (ref 0.7–1.3)
ERYTHROCYTE [DISTWIDTH] IN BLOOD BY AUTOMATED COUNT: 13.2 % (ref 10–15)
GFR SERPL CREATININE-BSD FRML MDRD: 75 ML/MIN/{1.73_M2}
GLUCOSE SERPL-MCNC: 422 MG/DL (ref 70–105)
HBA1C MFR BLD: 12 % (ref 4–6)
HCT VFR BLD AUTO: 40.8 % (ref 40–53)
HDLC SERPL-MCNC: 29 MG/DL (ref 23–92)
HGB BLD-MCNC: 13.9 G/DL (ref 13.3–17.7)
LDLC SERPL CALC-MCNC: ABNORMAL MG/DL
MCH RBC QN AUTO: 31.4 PG (ref 26.5–33)
MCHC RBC AUTO-ENTMCNC: 34.1 G/DL (ref 31.5–36.5)
MCV RBC AUTO: 92 FL (ref 78–100)
NONHDLC SERPL-MCNC: 191 MG/DL
PLATELET # BLD AUTO: 280 10E9/L (ref 150–450)
POTASSIUM SERPL-SCNC: 4.2 MMOL/L (ref 3.5–5.1)
RBC # BLD AUTO: 4.43 10E12/L (ref 4.4–5.9)
SODIUM SERPL-SCNC: 131 MMOL/L (ref 134–144)
T4 FREE SERPL-MCNC: 0.84 NG/DL (ref 0.6–1.6)
TRIGL SERPL-MCNC: 405 MG/DL
TSH SERPL DL<=0.05 MIU/L-ACNC: 0.54 IU/ML (ref 0.34–5.6)
WBC # BLD AUTO: 9.3 10E9/L (ref 4–11)

## 2020-08-14 PROCEDURE — 36415 COLL VENOUS BLD VENIPUNCTURE: CPT | Mod: ZL | Performed by: FAMILY MEDICINE

## 2020-08-14 PROCEDURE — 99214 OFFICE O/P EST MOD 30 MIN: CPT | Performed by: FAMILY MEDICINE

## 2020-08-14 PROCEDURE — 80061 LIPID PANEL: CPT | Mod: ZL | Performed by: FAMILY MEDICINE

## 2020-08-14 PROCEDURE — 84443 ASSAY THYROID STIM HORMONE: CPT | Mod: ZL | Performed by: FAMILY MEDICINE

## 2020-08-14 PROCEDURE — 85027 COMPLETE CBC AUTOMATED: CPT | Mod: ZL | Performed by: FAMILY MEDICINE

## 2020-08-14 PROCEDURE — 80048 BASIC METABOLIC PNL TOTAL CA: CPT | Mod: ZL | Performed by: FAMILY MEDICINE

## 2020-08-14 PROCEDURE — 84439 ASSAY OF FREE THYROXINE: CPT | Mod: ZL | Performed by: FAMILY MEDICINE

## 2020-08-14 PROCEDURE — 83036 HEMOGLOBIN GLYCOSYLATED A1C: CPT | Mod: ZL | Performed by: FAMILY MEDICINE

## 2020-08-14 RX ORDER — OXYCODONE AND ACETAMINOPHEN 5; 325 MG/1; MG/1
1 TABLET ORAL EVERY 4 HOURS PRN
Qty: 90 TABLET | Refills: 0 | Status: SHIPPED | OUTPATIENT
Start: 2020-08-14 | End: 2020-12-17

## 2020-08-14 RX ORDER — TRAMADOL HYDROCHLORIDE 50 MG/1
50 TABLET ORAL 4 TIMES DAILY
Qty: 90 TABLET | Refills: 5 | Status: SHIPPED | OUTPATIENT
Start: 2020-08-14 | End: 2020-12-17

## 2020-08-14 ASSESSMENT — ANXIETY QUESTIONNAIRES
6. BECOMING EASILY ANNOYED OR IRRITABLE: NOT AT ALL
GAD7 TOTAL SCORE: 0
3. WORRYING TOO MUCH ABOUT DIFFERENT THINGS: NOT AT ALL
IF YOU CHECKED OFF ANY PROBLEMS ON THIS QUESTIONNAIRE, HOW DIFFICULT HAVE THESE PROBLEMS MADE IT FOR YOU TO DO YOUR WORK, TAKE CARE OF THINGS AT HOME, OR GET ALONG WITH OTHER PEOPLE: NOT DIFFICULT AT ALL
1. FEELING NERVOUS, ANXIOUS, OR ON EDGE: NOT AT ALL
2. NOT BEING ABLE TO STOP OR CONTROL WORRYING: NOT AT ALL
5. BEING SO RESTLESS THAT IT IS HARD TO SIT STILL: NOT AT ALL
7. FEELING AFRAID AS IF SOMETHING AWFUL MIGHT HAPPEN: NOT AT ALL

## 2020-08-14 ASSESSMENT — ENCOUNTER SYMPTOMS
NUMBNESS: 0
PARESTHESIAS: 0
ABDOMINAL PAIN: 1
DIAPHORESIS: 0
FATIGUE: 0
SHORTNESS OF BREATH: 0
FEVER: 0

## 2020-08-14 ASSESSMENT — PAIN SCALES - GENERAL: PAINLEVEL: SEVERE PAIN (7)

## 2020-08-14 ASSESSMENT — PATIENT HEALTH QUESTIONNAIRE - PHQ9: 5. POOR APPETITE OR OVEREATING: NOT AT ALL

## 2020-08-14 NOTE — TELEPHONE ENCOUNTER
We had no openings, ? Refill medication or call back to make an appointment  Jailyn Baker LPN on 8/14/2020 at 9:17 AM

## 2020-08-14 NOTE — PROGRESS NOTES
SUBJECTIVE:   Jaswant Chong is a 50 year old male who presents to clinic today for the following health issues:    HPI  Follow up on cancer, pan meds and diabetes.  Over the winter had lost about 30# but gained most back with COVID.  Does not check his sugars.  Active, walking up to 6 miles daily at times with hunting.  Has CHF, was getting dizzy in [place] and stopped the aldactone.  Much improved now.  CHF was 40-45% last fall.  No longer has dyspnea on exertion at all.     Due for pain med refills. More pain in llq at lateral edge of large ventral hernia.    Recent Labs   Lab Test 01/02/20  1015 11/08/19  1146 06/25/19  1134 03/04/19  1119 11/30/18  1038  05/30/18  1110  12/20/16  1140   A1C  --  12.5* 12.8* 12.1* 13.9*  --  13.6*   < >  --    *  --   --   --   --   --  140*  --  122*   HDL 36  --   --   --   --   --  31  --  37   TRIG 158*  --   --   --   --   --  318*  --  237*   ALT 17 43  --   --  33  --  56*   < >  --    CR 1.02 1.02  --   --  0.81   < > 0.88   < >  --    GFRESTIMATED 78 Not Calculated  --   --  >90   < > >90  --   --    GFRESTBLACK >90 Not Calculated  --   --  >90   < > >90   < >  --    POTASSIUM 4.0 4.0  --   --  3.8   < > 4.0   < >  --     < > = values in this interval not displayed.              Patient Active Problem List    Diagnosis Date Noted     Essential hypertension 03/23/2020     Priority: Medium     Morbid obesity (H) 01/02/2020     Priority: Medium     Ascending aortic aneurysm (H) 01/02/2020     Priority: Medium     Dyspnea on exertion 01/02/2020     Priority: Medium     Chronic combined systolic and diastolic congestive heart failure (H) 12/10/2019     Priority: Medium     Status post coronary angiogram on 7/26/2018 with luminal irregularities 07/26/2018     Priority: Medium     Adenocarcinoma, 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  Involvement of the terminal ileum   with mucinous adenocarcinoma of the  appendix by direct extension.       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     Priority: Medium     Recurrent ventral hernia 09/14/2015     Priority: Medium     Controlled substance agreement signed 08/13/2015     Priority: Medium     Major depressive disorder, recurrent episode, moderate (H) 08/13/2015     Priority: Medium     Degenerative joint disease (DJD) of hip 02/06/2015     Priority: Medium     Hernia 12/03/2012     Priority: Medium     Abdominal pain 02/16/2012     Priority: Medium     Diabetes mellitus without complication (H) 12/07/2011     Priority: Medium     Ventral hernia with obstruction 12/06/2011     Priority: Medium     Varicocele 05/03/2011     Priority: Medium     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      Past Surgical History:   Procedure Laterality Date     COLON SURGERY      2006, Hemicolectomy with appendiceal cancer noted, mucinous type.     EXCISE CYST GENERIC (LOCATION)      sebaceous, scalp     OTHER SURGICAL HISTORY      2007,[id number],LYMPH NODE DISSECTION     OTHER SURGICAL HISTORY      03/08,,HERNIA REPAIR,Repair of surgical wound  hernia, metastatic cancer incidentally noted.     OTHER SURGICAL HISTORY      04/08,94549.9,HX ABDOMEN PERITONEUM OMENTUM SURGERY,removal of left hemidiaphragm and omentum [Other] as well as intraperitoneal chemotherapy, 5 FU and Oxaliplatin for metastatic mucinous adenocarcinoma of the appendix.[[[     OTHER SURGICAL HISTORY      04/09,,HERNIA REPAIR, Ventral hernia repair, recurrent appendiceal carcinoma, resection of 7 centimeter left upper quadrant tumor with splenectomy, Surgeon, Dr. Marks     OTHER SURGICAL HISTORY      11/2001,,HERNIA REPAIR,Reduction of ventral hernia and mesh repair of hernia     OTHER SURGICAL HISTORY      12/2012,,HERNIA REPAIR,Removal mesh with infection 12/12     OTHER SURGICAL HISTORY      4/25/13,082995,OTHER,left thumb, Dr. Donald     Social History     Tobacco Use     Smoking status: Never Smoker     Smokeless tobacco: Never Used   Substance Use Topics     Alcohol use: Yes     Alcohol/week: 0.0 standard drinks     Comment: beer 2 times a month     Current Outpatient Medications   Medication Sig Dispense Refill     oxyCODONE-acetaminophen (PERCOCET) 5-325 MG tablet Take 1 tablet by mouth every 4 hours as needed for pain 90 tablet 0     traMADol (ULTRAM) 50 MG tablet Take 1 tablet (50 mg) by mouth 4 times daily 90 tablet 5     aspirin (ASA) 81 MG chewable tablet Take 81 mg by mouth daily       BD VELIA U/F 32G X 4 MM insulin pen needle INJECTING THREE TIMES DAILY BEFORE MEALS 300 each 3     blood glucose monitoring (CONTOUR NEXT TEST) test strip Use to test blood sugar 2 times daily or as directed. 300 strip 3     blood glucose monitoring (COOL BLOOD GLUCOSE TEST STRIPS) test strip Use to test blood sugar 2 times daily or as directed. 200 each 3     Blood Glucose Monitoring Suppl (FIFTY50 GLUCOSE METER 2.0) w/Device KIT Check twice daily 1 kit 3     Blood Pressure KIT Blood pressure machine       carvedilol (COREG) 25 MG tablet Take 1 tablet (25 mg) by mouth 2 times  daily (with meals) 180 tablet 3     furosemide (LASIX) 40 MG tablet Take 1 tablet (40 mg) by mouth daily 90 tablet 3     insulin glargine (BASAGLAR KWIKPEN) 100 UNIT/ML pen Inject 20 Units Subcutaneous daily 30 mL 3     lancets 28G MISC Dispense item covered by pt ins. E11.9 NIDDM type II - Test 3 times/day, Reason: Unstable diabetes       lisinopril (ZESTRIL) 40 MG tablet Take 1 tablet by mouth daily       metFORMIN (GLUCOPHAGE) 1000 MG tablet Take 1 tablet by mouth 2 times daily (with meals)       rosuvastatin (CRESTOR) 20 MG tablet Take 1 tablet (20 mg) by mouth daily 90 tablet 3     sacubitril-valsartan (ENTRESTO) 49-51 MG per tablet Take 1 tablet by mouth 2 times daily 180 tablet 3     spironolactone (ALDACTONE) 25 MG tablet Take 1 tablet (25 mg) by mouth daily 90 tablet 3     traZODone (DESYREL) 50 MG tablet Take 1 tablet (50 mg) by mouth At Bedtime 90 tablet 3     No Known Allergies    Review of Systems   Constitutional: Negative for diaphoresis, fatigue and fever.   Respiratory: Negative for shortness of breath.    Cardiovascular: Negative for chest pain and peripheral edema.   Gastrointestinal: Positive for abdominal pain.   Neurological: Negative for numbness and paresthesias.        OBJECTIVE:     There were no vitals taken for this visit.  There is no height or weight on file to calculate BMI.  Physical Exam  Constitutional:       Appearance: Normal appearance.   Cardiovascular:      Rate and Rhythm: Normal rate and regular rhythm.      Pulses: Normal pulses.      Heart sounds: No murmur. No friction rub. No gallop.    Pulmonary:      Effort: Pulmonary effort is normal. No respiratory distress.      Breath sounds: No stridor.   Abdominal:      Comments: Large, over 30 cm, ventral hernia.  Mildly tender along left lateral border.  No redness.     Neurological:      General: No focal deficit present.      Mental Status: He is alert and oriented to person, place, and time.   Psychiatric:         Mood and  Affect: Mood normal.         Behavior: Behavior normal.         Thought Content: Thought content normal.     Sensory exam of the foot is normal, tested with the monofilament. Good pulses, no lesions or ulcers, good peripheral pulses.      Diagnostic Test Results:  Results for orders placed or performed in visit on 08/14/20   Basic Metabolic Panel     Status: Abnormal   Result Value Ref Range    Sodium 131 (L) 134 - 144 mmol/L    Potassium 4.2 3.5 - 5.1 mmol/L    Chloride 97 (L) 98 - 107 mmol/L    Carbon Dioxide 27 21 - 31 mmol/L    Anion Gap 7 3 - 14 mmol/L    Glucose 422 (H) 70 - 105 mg/dL    Urea Nitrogen 16 7 - 25 mg/dL    Creatinine 1.05 0.70 - 1.30 mg/dL    GFR Estimate 75 >60 mL/min/[1.73_m2]    GFR Estimate If Black >90 >60 mL/min/[1.73_m2]    Calcium 9.2 8.6 - 10.3 mg/dL   Lipid Profile     Status: Abnormal   Result Value Ref Range    Cholesterol 220 (H) <200 mg/dL    Triglycerides 405 (H) <150 mg/dL    HDL Cholesterol 29 23 - 92 mg/dL    LDL Cholesterol Calculated  <100 mg/dL     Cannot estimate LDL when triglyceride exceeds 400 mg/dL    Non HDL Cholesterol 191 (H) <130 mg/dL   CBC W PLT No Diff     Status: None   Result Value Ref Range    WBC 9.3 4.0 - 11.0 10e9/L    RBC Count 4.43 4.4 - 5.9 10e12/L    Hemoglobin 13.9 13.3 - 17.7 g/dL    Hematocrit 40.8 40.0 - 53.0 %    MCV 92 78 - 100 fl    MCH 31.4 26.5 - 33.0 pg    MCHC 34.1 31.5 - 36.5 g/dL    RDW 13.2 10.0 - 15.0 %    Platelet Count 280 150 - 450 10e9/L   T4, Free     Status: None   Result Value Ref Range    T4 Free 0.84 0.60 - 1.60 ng/dL   Thyrotropin GH     Status: None   Result Value Ref Range    Thyrotropin 0.54 0.34 - 5.60 IU/mL   Hemoglobin A1c     Status: Abnormal   Result Value Ref Range    Hemoglobin A1C 12.0 (H) 4.0 - 6.0 %       ASSESSMENT/PLAN:         (E11.9) Diabetes mellitus without complication (H)  (primary encounter diagnosis)  Comment: poor control with non compliance with taking the meds. Has not been using victoza, partly due to  cost.  Not checking sugars and still has a rather high degree of denial about the diagnosis in general.  I encouraged him to at least take the insulin as prescribed.    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  Refilled his narcotics for treatment of this.  Continues to follow up with surgery oncology at the [facility] for small mass noted on CT in colon.    Plan: traMADol (ULTRAM) 50 MG tablet,         oxyCODONE-acetaminophen (PERCOCET) 5-325 MG         tablet                 Mik Kumari MD  [facility] AND [facility]

## 2020-08-14 NOTE — NURSING NOTE
"Coming in for a medication check up    Chief Complaint   Patient presents with     Recheck Medication       Initial /88   Temp 98.6  F (37  C)   Resp 16   Wt 129.7 kg (286 lb)   BMI 36.70 kg/m   Estimated body mass index is 36.7 kg/m  as calculated from the following:    Height as of 1/13/20: 1.88 m (6' 2.02\").    Weight as of this encounter: 129.7 kg (286 lb).  Medication Reconciliation: complete    Jailyn Baker LPN  "

## 2020-08-15 ASSESSMENT — ANXIETY QUESTIONNAIRES: GAD7 TOTAL SCORE: 0

## 2020-11-06 ENCOUNTER — ALLIED HEALTH/NURSE VISIT (OUTPATIENT)
Dept: FAMILY MEDICINE | Facility: OTHER | Age: 50
End: 2020-11-06
Attending: FAMILY MEDICINE
Payer: COMMERCIAL

## 2020-11-06 DIAGNOSIS — Z20.822 COVID-19 RULED OUT: Primary | ICD-10-CM

## 2020-11-06 PROCEDURE — C9803 HOPD COVID-19 SPEC COLLECT: HCPCS

## 2020-11-06 PROCEDURE — 99207 PR NO CHARGE NURSE ONLY: CPT

## 2020-11-06 PROCEDURE — U0003 INFECTIOUS AGENT DETECTION BY NUCLEIC ACID (DNA OR RNA); SEVERE ACUTE RESPIRATORY SYNDROME CORONAVIRUS 2 (SARS-COV-2) (CORONAVIRUS DISEASE [COVID-19]), AMPLIFIED PROBE TECHNIQUE, MAKING USE OF HIGH THROUGHPUT TECHNOLOGIES AS DESCRIBED BY CMS-2020-01-R: HCPCS | Mod: ZL | Performed by: FAMILY MEDICINE

## 2020-11-07 LAB
SARS-COV-2 RNA SPEC QL NAA+PROBE: ABNORMAL
SPECIMEN SOURCE: ABNORMAL

## 2020-11-08 ENCOUNTER — MYC MEDICAL ADVICE (OUTPATIENT)
Dept: FAMILY MEDICINE | Facility: OTHER | Age: 50
End: 2020-11-08

## 2020-11-09 NOTE — TELEPHONE ENCOUNTER
Patient question as to what symptoms should be on alert for regarding positive COVID-19 results. Patient not aware of call  Made to patient regarding COVID positive results. States has been quarentined since Tuesday morning 11/03/2020. Discussed remaining quarantined for at least 10 days from time symptoms started until symptoms improve and no feer for 24 hours. Patient states has no symptoms or fever, no congestion, just symptom of fatigue. Discussed patient remaining in contact with employer regarding guidelines for returning to work. Discussed COVID-19 positive result letter as written within patient chart and sent 11/08/2020. Discussed remaining hydrated, potential COVID-19 symptoms, respiratory concerns, fever, loss of taste and smell, generalized fatigue and body aches. Discussed when to return call or seek care for emergent symptoms. Patient verbalized understanding. Has no further questions or concerns at this time.  Pia Jose RN ....................  11/9/2020   9:53 AM

## 2020-12-16 ENCOUNTER — TELEPHONE (OUTPATIENT)
Dept: FAMILY MEDICINE | Facility: OTHER | Age: 50
End: 2020-12-16

## 2020-12-17 ENCOUNTER — OFFICE VISIT (OUTPATIENT)
Dept: FAMILY MEDICINE | Facility: OTHER | Age: 50
End: 2020-12-17
Attending: FAMILY MEDICINE
Payer: COMMERCIAL

## 2020-12-17 VITALS
HEART RATE: 60 BPM | BODY MASS INDEX: 36.57 KG/M2 | TEMPERATURE: 97.5 F | DIASTOLIC BLOOD PRESSURE: 80 MMHG | WEIGHT: 285 LBS | SYSTOLIC BLOOD PRESSURE: 130 MMHG | OXYGEN SATURATION: 98 % | RESPIRATION RATE: 16 BRPM

## 2020-12-17 DIAGNOSIS — I10 HTN (HYPERTENSION), MALIGNANT: ICD-10-CM

## 2020-12-17 DIAGNOSIS — I10 ESSENTIAL HYPERTENSION: ICD-10-CM

## 2020-12-17 DIAGNOSIS — C18.1 MALIGNANT NEOPLASM OF APPENDIX VERMIFORMIS (H): ICD-10-CM

## 2020-12-17 DIAGNOSIS — R31.0 GROSS HEMATURIA: Primary | ICD-10-CM

## 2020-12-17 DIAGNOSIS — I50.42 CHRONIC COMBINED SYSTOLIC AND DIASTOLIC CONGESTIVE HEART FAILURE (H): ICD-10-CM

## 2020-12-17 DIAGNOSIS — F51.01 PRIMARY INSOMNIA: ICD-10-CM

## 2020-12-17 DIAGNOSIS — E11.9 DIABETES MELLITUS WITHOUT COMPLICATION (H): ICD-10-CM

## 2020-12-17 DIAGNOSIS — E78.2 MIXED HYPERLIPIDEMIA: ICD-10-CM

## 2020-12-17 DIAGNOSIS — R06.09 DYSPNEA ON EXERTION: ICD-10-CM

## 2020-12-17 LAB
ALBUMIN SERPL-MCNC: 3.8 G/DL (ref 3.5–5.7)
ALBUMIN UR-MCNC: 30 MG/DL
ALP SERPL-CCNC: 109 U/L (ref 34–104)
ALT SERPL W P-5'-P-CCNC: 34 U/L (ref 7–52)
ANION GAP SERPL CALCULATED.3IONS-SCNC: 10 MMOL/L (ref 3–14)
APPEARANCE UR: CLEAR
AST SERPL W P-5'-P-CCNC: 23 U/L (ref 13–39)
BILIRUB SERPL-MCNC: 0.5 MG/DL (ref 0.3–1)
BILIRUB UR QL STRIP: NEGATIVE
BUN SERPL-MCNC: 20 MG/DL (ref 7–25)
CALCIUM SERPL-MCNC: 9.7 MG/DL (ref 8.6–10.3)
CHLORIDE SERPL-SCNC: 91 MMOL/L (ref 98–107)
CO2 SERPL-SCNC: 28 MMOL/L (ref 21–31)
COLOR UR AUTO: ABNORMAL
CREAT SERPL-MCNC: 0.97 MG/DL (ref 0.7–1.3)
GFR SERPL CREATININE-BSD FRML MDRD: 82 ML/MIN/{1.73_M2}
GLUCOSE SERPL-MCNC: 449 MG/DL (ref 70–105)
GLUCOSE UR STRIP-MCNC: >1000 MG/DL
HBA1C MFR BLD: 13.5 % (ref 4–6)
HGB UR QL STRIP: NEGATIVE
KETONES UR STRIP-MCNC: NEGATIVE MG/DL
LEUKOCYTE ESTERASE UR QL STRIP: NEGATIVE
NITRATE UR QL: NEGATIVE
NT-PROBNP SERPL-MCNC: 188 PG/ML (ref 0–100)
PH UR STRIP: 6 PH (ref 5–7)
POTASSIUM SERPL-SCNC: 4.5 MMOL/L (ref 3.5–5.1)
PROT SERPL-MCNC: 7 G/DL (ref 6.4–8.9)
RBC #/AREA URNS AUTO: 1 /HPF (ref 0–2)
SODIUM SERPL-SCNC: 129 MMOL/L (ref 134–144)
SOURCE: ABNORMAL
SP GR UR STRIP: 1.04 (ref 1–1.03)
UROBILINOGEN UR STRIP-MCNC: NORMAL MG/DL (ref 0–2)
WBC #/AREA URNS AUTO: 1 /HPF (ref 0–5)

## 2020-12-17 PROCEDURE — 36415 COLL VENOUS BLD VENIPUNCTURE: CPT | Mod: ZL | Performed by: FAMILY MEDICINE

## 2020-12-17 PROCEDURE — 81001 URINALYSIS AUTO W/SCOPE: CPT | Mod: ZL | Performed by: FAMILY MEDICINE

## 2020-12-17 PROCEDURE — 99214 OFFICE O/P EST MOD 30 MIN: CPT | Performed by: FAMILY MEDICINE

## 2020-12-17 PROCEDURE — 83880 ASSAY OF NATRIURETIC PEPTIDE: CPT | Mod: ZL | Performed by: FAMILY MEDICINE

## 2020-12-17 PROCEDURE — 80053 COMPREHEN METABOLIC PANEL: CPT | Mod: ZL | Performed by: FAMILY MEDICINE

## 2020-12-17 PROCEDURE — 83036 HEMOGLOBIN GLYCOSYLATED A1C: CPT | Mod: ZL | Performed by: FAMILY MEDICINE

## 2020-12-17 RX ORDER — CARVEDILOL 25 MG/1
25 TABLET ORAL 2 TIMES DAILY WITH MEALS
Qty: 180 TABLET | Refills: 3 | Status: SHIPPED | OUTPATIENT
Start: 2020-12-17 | End: 2021-06-25

## 2020-12-17 RX ORDER — LISINOPRIL 40 MG/1
40 TABLET ORAL DAILY
Qty: 90 TABLET | Refills: 3 | Status: SHIPPED | OUTPATIENT
Start: 2020-12-17 | End: 2021-06-25

## 2020-12-17 RX ORDER — FUROSEMIDE 40 MG
40 TABLET ORAL DAILY
Qty: 90 TABLET | Refills: 3 | Status: SHIPPED | OUTPATIENT
Start: 2020-12-17 | End: 2021-06-25

## 2020-12-17 RX ORDER — TRAMADOL HYDROCHLORIDE 50 MG/1
50 TABLET ORAL 4 TIMES DAILY
Qty: 90 TABLET | Refills: 5 | Status: SHIPPED | OUTPATIENT
Start: 2020-12-17 | End: 2021-06-25

## 2020-12-17 RX ORDER — BLOOD SUGAR DIAGNOSTIC
STRIP MISCELLANEOUS
Qty: 200 EACH | Refills: 3 | Status: SHIPPED | OUTPATIENT
Start: 2020-12-17 | End: 2023-03-30

## 2020-12-17 RX ORDER — INSULIN GLARGINE 100 [IU]/ML
20 INJECTION, SOLUTION SUBCUTANEOUS DAILY
Qty: 30 ML | Refills: 3 | Status: SHIPPED | OUTPATIENT
Start: 2020-12-17 | End: 2021-06-25

## 2020-12-17 RX ORDER — PEN NEEDLE, DIABETIC 32GX 5/32"
NEEDLE, DISPOSABLE MISCELLANEOUS
Qty: 300 EACH | Refills: 3 | Status: SHIPPED | OUTPATIENT
Start: 2020-12-17 | End: 2022-10-26

## 2020-12-17 RX ORDER — OXYCODONE AND ACETAMINOPHEN 5; 325 MG/1; MG/1
1 TABLET ORAL EVERY 4 HOURS PRN
Qty: 90 TABLET | Refills: 0 | Status: SHIPPED | OUTPATIENT
Start: 2020-12-17 | End: 2021-06-25

## 2020-12-17 RX ORDER — INSULIN PUMP SYRINGE, 3 ML
EACH MISCELLANEOUS
Qty: 1 KIT | Refills: 3 | Status: SHIPPED | OUTPATIENT
Start: 2020-12-17 | End: 2023-03-30

## 2020-12-17 RX ORDER — TRAZODONE HYDROCHLORIDE 50 MG/1
50 TABLET, FILM COATED ORAL AT BEDTIME
Qty: 90 TABLET | Refills: 3 | Status: ON HOLD | OUTPATIENT
Start: 2020-12-17 | End: 2024-02-23

## 2020-12-17 RX ORDER — SACUBITRIL AND VALSARTAN 49; 51 MG/1; MG/1
1 TABLET, FILM COATED ORAL 2 TIMES DAILY
Qty: 180 TABLET | Refills: 3 | Status: SHIPPED | OUTPATIENT
Start: 2020-12-17 | End: 2021-06-25

## 2020-12-17 RX ORDER — SPIRONOLACTONE 25 MG/1
25 TABLET ORAL DAILY
Qty: 90 TABLET | Refills: 3 | Status: SHIPPED | OUTPATIENT
Start: 2020-12-17 | End: 2021-06-25

## 2020-12-17 RX ORDER — ROSUVASTATIN CALCIUM 20 MG/1
20 TABLET, COATED ORAL DAILY
Qty: 90 TABLET | Refills: 3 | Status: SHIPPED | OUTPATIENT
Start: 2020-12-17 | End: 2022-07-27

## 2020-12-17 ASSESSMENT — ANXIETY QUESTIONNAIRES
5. BEING SO RESTLESS THAT IT IS HARD TO SIT STILL: NOT AT ALL
3. WORRYING TOO MUCH ABOUT DIFFERENT THINGS: NOT AT ALL
6. BECOMING EASILY ANNOYED OR IRRITABLE: NOT AT ALL
1. FEELING NERVOUS, ANXIOUS, OR ON EDGE: NOT AT ALL
7. FEELING AFRAID AS IF SOMETHING AWFUL MIGHT HAPPEN: NOT AT ALL
IF YOU CHECKED OFF ANY PROBLEMS ON THIS QUESTIONNAIRE, HOW DIFFICULT HAVE THESE PROBLEMS MADE IT FOR YOU TO DO YOUR WORK, TAKE CARE OF THINGS AT HOME, OR GET ALONG WITH OTHER PEOPLE: NOT DIFFICULT AT ALL
GAD7 TOTAL SCORE: 0
2. NOT BEING ABLE TO STOP OR CONTROL WORRYING: NOT AT ALL

## 2020-12-17 ASSESSMENT — ENCOUNTER SYMPTOMS
FLANK PAIN: 1
SHORTNESS OF BREATH: 1
COUGH: 1
FATIGUE: 1
BACK PAIN: 1
FEVER: 0

## 2020-12-17 ASSESSMENT — PAIN SCALES - GENERAL: PAINLEVEL: SEVERE PAIN (7)

## 2020-12-17 ASSESSMENT — PATIENT HEALTH QUESTIONNAIRE - PHQ9: 5. POOR APPETITE OR OVEREATING: NOT AT ALL

## 2020-12-17 NOTE — NURSING NOTE
"Coming in for blood in the urine    Chief Complaint   Patient presents with     Urinary Problem     UTI       Initial /80   Pulse 60   Temp 97.5  F (36.4  C)   Resp 16   Wt 129.3 kg (285 lb)   SpO2 98%   BMI 36.57 kg/m   Estimated body mass index is 36.57 kg/m  as calculated from the following:    Height as of 1/13/20: 1.88 m (6' 2.02\").    Weight as of this encounter: 129.3 kg (285 lb).  Medication Reconciliation: complete    Jailyn Baker LPN  "

## 2020-12-17 NOTE — PROGRESS NOTES
SUBJECTIVE:   Jaswant Chong is a 50 year old male who presents to clinic today for the following health issues:    HPI  Hematuria and follow up on his many meical problems.    Last year he had CHF with reduced EF. This improved with Ice inhibitors.  Over this fall he had COVD, with some SHORTNESS OF BREATH, but feels quite a bit better. Minimal shortness of breath.  Mild nagging cough.  Low energy overall. Lots of joint pains and especially pain in abdomen form his cancer surgery.  would like a refill on his narcotics for this.   Last CT was 1 year ago, no kidney stoned but has a mass near sigmoid that is likely his cancer, but surgical oncology advised serial CT scanning.      Recent Labs   Lab Test 08/14/20  1332 01/02/20  1015 11/08/19  1146 06/25/19  1134 11/30/18  1038 11/30/18  1038 05/30/18  1110 05/30/18  1110   A1C 12.0*  --  12.5* 12.8*   < > 13.9*  --  13.6*   LDL Cannot estimate LDL when triglyceride exceeds 400 mg/dL 106*  --   --   --   --   --  140*   HDL 29 36  --   --   --   --   --  31   TRIG 405* 158*  --   --   --   --   --  318*   ALT  --  17 43  --   --  33  --  56*   CR 1.05 1.02 1.02  --   --  0.81   < > 0.88   GFRESTIMATED 75 78 Not Calculated  --   --  >90   < > >90   GFRESTBLACK >90 >90 Not Calculated  --   --  >90   < > >90   POTASSIUM 4.2 4.0 4.0  --   --  3.8   < > 4.0    < > = values in this interval not displayed.        3 nights ago he had some scant blood in the toilet when he woke up. Had urinated overnight. Some mild right flank pain prior to this, wondering if it was a kidney stones.  Has lots of polyuria.  Does not check sugars but drinking lots of water.       Past Medical History:   Diagnosis Date     Malignant neoplasm of appendix (H)     H/O     Type 2 diabetes mellitus without complications (H)     Type 2     Ventral hernia without obstruction or gangrene     No Comments Provided     Vesicointestinal fistula     history of, secondary to diverticulitis.      Past Surgical  History:   Procedure Laterality Date     COLON SURGERY      2006, Hemicolectomy with appendiceal cancer noted, mucinous type.     EXCISE CYST GENERIC (LOCATION)      sebaceous, scalp     OTHER SURGICAL HISTORY      2007,[id number],LYMPH NODE DISSECTION     OTHER SURGICAL HISTORY      03/08,,HERNIA REPAIR,Repair of surgical wound hernia, metastatic cancer incidentally noted.     OTHER SURGICAL HISTORY      04/08,41746.9,HX ABDOMEN PERITONEUM OMENTUM SURGERY,removal of left hemidiaphragm and omentum [Other] as well as intraperitoneal chemotherapy, 5 FU and Oxaliplatin for metastatic mucinous adenocarcinoma of the appendix.[[[     OTHER SURGICAL HISTORY      04/09,,HERNIA REPAIR, Ventral hernia repair, recurrent appendiceal carcinoma, resection of 7 centimeter left upper quadrant tumor with splenectomy, Surgeon, Dr. Marks     OTHER SURGICAL HISTORY      11/2001,,HERNIA REPAIR,Reduction of ventral hernia and mesh repair of hernia     OTHER SURGICAL HISTORY      12/2012,,HERNIA REPAIR,Removal mesh with infection 12/12     OTHER SURGICAL HISTORY      4/25/13,[id number],OTHER,left thumb, Dr. Donald     Social History     Tobacco Use     Smoking status: Never Smoker     Smokeless tobacco: Never Used   Substance Use Topics     Alcohol use: Yes     Alcohol/week: 0.0 standard drinks     Comment: beer 2 times a month     Current Outpatient Medications   Medication Sig Dispense Refill     aspirin (ASA) 81 MG chewable tablet Take 81 mg by mouth daily       blood glucose (COOL BLOOD GLUCOSE TEST STRIPS) test strip Use to test blood sugar 2 times daily or as directed. 200 each 3     blood glucose monitoring (CONTOUR NEXT TEST) test strip Use to test blood sugar 2 times daily or as directed. 300 strip 3     Blood Glucose Monitoring Suppl (FIFTY50 GLUCOSE METER 2.0) w/Device KIT Check twice daily 1 kit 3     Blood Pressure KIT Blood pressure machine       carvedilol (COREG) 25 MG tablet Take 1 tablet (25 mg) by mouth 2  times daily (with meals) 180 tablet 3     furosemide (LASIX) 40 MG tablet Take 1 tablet (40 mg) by mouth daily 90 tablet 3     insulin glargine (BASAGLAR KWIKPEN) 100 UNIT/ML pen Inject 20 Units Subcutaneous daily 30 mL 3     insulin pen needle (BD VELIA U/F) 32G X 4 MM miscellaneous INJECTING THREE TIMES DAILY BEFORE MEALS 300 each 3     lancets 28G MISC Dispense item covered by pt ins. E11.9 NIDDM type II - Test 3 times/day, Reason: Unstable diabetes       lisinopril (ZESTRIL) 40 MG tablet Take 1 tablet (40 mg) by mouth daily 90 tablet 3     metFORMIN (GLUCOPHAGE) 1000 MG tablet Take 1 tablet (1,000 mg) by mouth 2 times daily (with meals) 180 tablet 3     oxyCODONE-acetaminophen (PERCOCET) 5-325 MG tablet Take 1 tablet by mouth every 4 hours as needed for pain 90 tablet 0     rosuvastatin (CRESTOR) 20 MG tablet Take 1 tablet (20 mg) by mouth daily 90 tablet 3     sacubitril-valsartan (ENTRESTO) 49-51 MG per tablet Take 1 tablet by mouth 2 times daily 180 tablet 3     spironolactone (ALDACTONE) 25 MG tablet Take 1 tablet (25 mg) by mouth daily 90 tablet 3     traMADol (ULTRAM) 50 MG tablet Take 1 tablet (50 mg) by mouth 4 times daily 90 tablet 5     traZODone (DESYREL) 50 MG tablet Take 1 tablet (50 mg) by mouth At Bedtime 90 tablet 3     No Known Allergies    Review of Systems   Constitutional: Positive for fatigue. Negative for fever.   Respiratory: Positive for cough and shortness of breath.    Cardiovascular: Negative for chest pain and peripheral edema.   Genitourinary: Positive for flank pain.   Musculoskeletal: Positive for back pain.        OBJECTIVE:     /80   Pulse 60   Temp 97.5  F (36.4  C)   Resp 16   Wt 129.3 kg (285 lb)   SpO2 98%   BMI 36.57 kg/m    Body mass index is 36.57 kg/m .  Physical Exam  Constitutional:       Appearance: Normal appearance.   Cardiovascular:      Rate and Rhythm: Normal rate and regular rhythm.   Pulmonary:      Effort: Pulmonary effort is normal. No respiratory  distress.      Breath sounds: No stridor.   Abdominal:      Tenderness: There is no right CVA tenderness or left CVA tenderness.      Comments: Large ventral hernia, minimally painful on palpation.   Neurological:      General: No focal deficit present.      Mental Status: He is alert and oriented to person, place, and time.   Psychiatric:         Mood and Affect: Mood normal.         Behavior: Behavior normal.         Thought Content: Thought content normal.         Diagnostic Test Results:  Results for orders placed or performed in visit on 12/17/20   UA reflex to Microscopic and Culture     Status: Abnormal    Specimen: Midstream Urine   Result Value Ref Range    Color Urine Light Yellow     Appearance Urine Clear     Glucose Urine >1000 (A) NEG^Negative mg/dL    Bilirubin Urine Negative NEG^Negative    Ketones Urine Negative NEG^Negative mg/dL    Specific Gravity Urine 1.037 (H) 1.003 - 1.035    Blood Urine Negative NEG^Negative    pH Urine 6.0 5.0 - 7.0 pH    Protein Albumin Urine 30 (A) NEG^Negative mg/dL    Urobilinogen mg/dL Normal 0.0 - 2.0 mg/dL    Nitrite Urine Negative NEG^Negative    Leukocyte Esterase Urine Negative NEG^Negative    Source Midstream Urine     RBC Urine 1 0 - 2 /HPF    WBC Urine 1 0 - 5 /HPF     Results for orders placed or performed in visit on 12/17/20   UA reflex to Microscopic and Culture     Status: Abnormal    Specimen: Midstream Urine   Result Value Ref Range    Color Urine Light Yellow     Appearance Urine Clear     Glucose Urine >1000 (A) NEG^Negative mg/dL    Bilirubin Urine Negative NEG^Negative    Ketones Urine Negative NEG^Negative mg/dL    Specific Gravity Urine 1.037 (H) 1.003 - 1.035    Blood Urine Negative NEG^Negative    pH Urine 6.0 5.0 - 7.0 pH    Protein Albumin Urine 30 (A) NEG^Negative mg/dL    Urobilinogen mg/dL Normal 0.0 - 2.0 mg/dL    Nitrite Urine Negative NEG^Negative    Leukocyte Esterase Urine Negative NEG^Negative    Source Midstream Urine     RBC Urine 1  0 - 2 /HPF    WBC Urine 1 0 - 5 /HPF   Brain Natriuretic Peptide (BNP)     Status: Abnormal   Result Value Ref Range    N-Terminal Pro Bnp 188 (H) 0 - 100 pg/mL   Comprehensive Metabolic Panel     Status: Abnormal   Result Value Ref Range    Sodium 129 (L) 134 - 144 mmol/L    Potassium 4.5 3.5 - 5.1 mmol/L    Chloride 91 (L) 98 - 107 mmol/L    Carbon Dioxide 28 21 - 31 mmol/L    Anion Gap 10 3 - 14 mmol/L    Glucose 449 (H) 70 - 105 mg/dL    Urea Nitrogen 20 7 - 25 mg/dL    Creatinine 0.97 0.70 - 1.30 mg/dL    GFR Estimate 82 >60 mL/min/[1.73_m2]    GFR Estimate If Black >90 >60 mL/min/[1.73_m2]    Calcium 9.7 8.6 - 10.3 mg/dL    Bilirubin Total 0.5 0.3 - 1.0 mg/dL    Albumin 3.8 3.5 - 5.7 g/dL    Protein Total 7.0 6.4 - 8.9 g/dL    Alkaline Phosphatase 109 (H) 34 - 104 U/L    ALT 34 7 - 52 U/L    AST 23 13 - 39 U/L   Hemoglobin A1c     Status: Abnormal   Result Value Ref Range    Hemoglobin A1C 13.5 (H) 4.0 - 6.0 %         ASSESSMENT/PLAN:           ICD-10-CM    1. Gross hematuria  R31.0 CT Abdomen Pelvis w/o & w Contrast     CANCELED: UA reflex to Microscopic and Culture   2. HTN (hypertension), malignant  I10 carvedilol (COREG) 25 MG tablet     furosemide (LASIX) 40 MG tablet     spironolactone (ALDACTONE) 25 MG tablet     lisinopril (ZESTRIL) 40 MG tablet     Comprehensive Metabolic Panel   3. Diabetes mellitus without complication (H)  E11.9 insulin pen needle (BD VELIA U/F) 32G X 4 MM miscellaneous     blood glucose (COOL BLOOD GLUCOSE TEST STRIPS) test strip     Blood Glucose Monitoring Suppl (FIFTY50 GLUCOSE METER 2.0) w/Device KIT     insulin glargine (BASAGLAR KWIKPEN) 100 UNIT/ML pen     metFORMIN (GLUCOPHAGE) 1000 MG tablet     Hemoglobin A1c   4. Malignant neoplasm of appendix vermiformis (H)  C18.1 oxyCODONE-acetaminophen (PERCOCET) 5-325 MG tablet     traMADol (ULTRAM) 50 MG tablet     CT Abdomen Pelvis w/o & w Contrast   5. Mixed hyperlipidemia  E78.2 rosuvastatin (CRESTOR) 20 MG tablet   6. Chronic  combined systolic and diastolic congestive heart failure (H)  I50.42 sacubitril-valsartan (ENTRESTO) 49-51 MG per tablet     spironolactone (ALDACTONE) 25 MG tablet     Brain Natriuretic Peptide (BNP)   7. Dyspnea on exertion  R06.00 sacubitril-valsartan (ENTRESTO) 49-51 MG per tablet   8. Essential hypertension  I10 spironolactone (ALDACTONE) 25 MG tablet   9. Primary insomnia  F51.01 traZODone (DESYREL) 50 MG tablet   10. Hematuria  R31.9 UA reflex to Microscopic and Culture     This is reported gross hematuria, with a normal UA now.  The toilet water appeared pale pink.  Possible causes are stones, or even urinary tract cancer.  A met from his known appendix cancer is a consideration as well.  Will proceed with a CT next.  Would get urology or surgical oncology involved quickly.  Above meds refilled, follow up requested in 4 weeks or so.    DIABETES is not under control, suspect this is from both dietary and medication non compliance.  I asked him to resume his prior meds.  Refilled allo of the above meds.  Follow up on this every 3 months.    Mik Kumari MD  [facility] AND \[facility] [place] [facility]\""

## 2020-12-18 ASSESSMENT — ANXIETY QUESTIONNAIRES: GAD7 TOTAL SCORE: 0

## 2020-12-21 ENCOUNTER — HOSPITAL ENCOUNTER (OUTPATIENT)
Dept: CT IMAGING | Facility: OTHER | Age: 50
Discharge: HOME | End: 2020-12-21
Attending: FAMILY MEDICINE | Admitting: FAMILY MEDICINE
Payer: COMMERCIAL

## 2020-12-21 DIAGNOSIS — C18.1 MALIGNANT NEOPLASM OF APPENDIX VERMIFORMIS (H): ICD-10-CM

## 2020-12-21 DIAGNOSIS — R31.0 GROSS HEMATURIA: ICD-10-CM

## 2020-12-21 PROCEDURE — 255N000002 HC RX 255 OP 636: Performed by: FAMILY MEDICINE

## 2020-12-21 PROCEDURE — 74178 CT ABD&PLV WO CNTR FLWD CNTR: CPT

## 2020-12-21 RX ADMIN — IOHEXOL 100 ML: 350 INJECTION, SOLUTION INTRAVENOUS at 13:52

## 2021-01-15 ENCOUNTER — HEALTH MAINTENANCE LETTER (OUTPATIENT)
Age: 51
End: 2021-01-15

## 2021-01-28 ENCOUNTER — MYC MEDICAL ADVICE (OUTPATIENT)
Dept: FAMILY MEDICINE | Facility: OTHER | Age: 51
End: 2021-01-28

## 2021-05-24 ENCOUNTER — PATIENT OUTREACH (OUTPATIENT)
Dept: FAMILY MEDICINE | Facility: OTHER | Age: 51
End: 2021-05-24

## 2021-05-24 NOTE — LETTER
[facility]  [address]  [place] [id number] [phone]       May 24, 2021    Jaswant Chong  [address]  [place] [address]    Dear Enzo,    We care about your health and have reviewed your health plan and are making recommendations based on this review, to optimize your health.    You are in particular need of attention regarding:  -Depression  -Diabetes  -Colon Cancer Screening  -Wellness (Physical) Visit     We are recommending that you:  -schedule a FOLLOWUP OFFICE APPOINTMENT with Mik Kumari.  I will recheck your: A1c and BMP (basic metabolic panel) tests.    -schedule a WELLNESS (Physical) APPOINTMENT with Mik Kumari.   I will check fasting labs the same day - nothing to eat except water and meds for 8-10 hours prior.    -schedule a COLONOSCOPY to look for colon cancer (due every 10 years or 5 years in higher risk situations.)        Colon cancer is now the second leading cause of cancer-related deaths in the [place] for both men and women and there are over 130,000 new cases and 50,000 deaths per year from colon cancer.  Colonoscopies can prevent 90-95% of these deaths.  Problem lesions can be removed before they ever become cancer.  This test is not only looking for cancer, but also getting rid of precancerious lesions.    If you are under/uninsured, we recommend you contact the [employer] program. [employer] is a free colorectal cancer screening program that provides colonoscopies for eligible under/uninsured [place] men and women. If you are interested in receiving a free colonoscopy, please call [facility] at [phone] (mention code [employer]) to see if you re eligible.      If you do not wish to do a colonoscopy or cannot afford to do one, at this time, there is another option. It is called a FIT test or Fecal Immunochemical Occult Blood Test (take home stool sample kit).  It does not replace the colonoscopy for colorectal cancer  screening, but it can detect hidden bleeding in the lower colon.  It does need to be repeated every year and if a positive result is obtained, you would be referred for a colonoscopy.          If you have completed either one of these tests at another facility, please call with the details of when and where the tests were done and if they were normal or not. Or have the records sent to our clinic so that we can best coordinate your care.       -Diabetic Eye Exam is due.  If this was done within the last 12 months then please contact the clinic with the date and location so we can update your records.    In addition, here is a list of due or overdue Health Maintenance reminders.    Health Maintenance Due   Topic Date Due     Preventive Care Visit  Never done     Heart Failure Action Plan  Never done     Discuss Advance Care Planning  Never done     Eye Exam  Never done     Colorectal Cancer Screening  Never done     COVID-19 Vaccine (1) Never done     HIV Screening  Never done     Hepatitis C Screening  Never done     Hepatitis B Vaccine (1 of 3 - Risk 3-dose series) Never done     Depression Assessment  03/17/2016     Diptheria Tetanus Pertussis (DTAP/TDAP/TD) Vaccine (2 - Td) 06/21/2016     Diabetic Foot Exam  03/04/2020     Kidney Microalbumin Urine Test  11/08/2020     Zoster (Shingles) Vaccine (1 of 2) 07/23/2020     Basic Metabolic Panel  06/17/2021       To address the above recommendations, we encourage you to contact us at [phone]. They will assist you with finding the most convenient time and location.    Thank you for trusting [facility] and we appreciate the opportunity to serve you.  We look forward to supporting your healthcare needs in the future.    Healthy Regards,    Your [facility] Team

## 2021-05-24 NOTE — TELEPHONE ENCOUNTER
Patient Quality Outreach 2nd Attempt      Summary:    Type of outreach:    Sent letter.    Next Steps:  Reach out within 90 days via Letter.    Max number of attempts reached: No. Will try again in 90 days if patient still on fail list.    Questions for provider review:    None                                                                                                                    Norma J. Gosselin, LPN       Chart routed to Care Team.

## 2021-06-18 ENCOUNTER — TELEPHONE (OUTPATIENT)
Dept: FAMILY MEDICINE | Facility: OTHER | Age: 51
End: 2021-06-18

## 2021-06-18 NOTE — TELEPHONE ENCOUNTER
Spoke with patient and appointment was made for Friday 6/25/2021 @ 9:40  Ivy Reinoso LPN ....................  6/18/2021   2:20 PM

## 2021-06-18 NOTE — TELEPHONE ENCOUNTER
Please call the patient.   He would like a work in next week with [employer]  For a check up or follow up and he is going on a trip out of the country.      Laurence Green on 6/18/2021 at 2:16 PM

## 2021-06-25 ENCOUNTER — OFFICE VISIT (OUTPATIENT)
Dept: FAMILY MEDICINE | Facility: OTHER | Age: 51
End: 2021-06-25
Attending: FAMILY MEDICINE
Payer: COMMERCIAL

## 2021-06-25 VITALS
WEIGHT: 288 LBS | BODY MASS INDEX: 36.96 KG/M2 | TEMPERATURE: 97.8 F | RESPIRATION RATE: 16 BRPM | OXYGEN SATURATION: 97 % | HEART RATE: 76 BPM | SYSTOLIC BLOOD PRESSURE: 200 MMHG | HEIGHT: 74 IN | DIASTOLIC BLOOD PRESSURE: 140 MMHG

## 2021-06-25 DIAGNOSIS — E11.9 DIABETES MELLITUS WITHOUT COMPLICATION (H): Primary | ICD-10-CM

## 2021-06-25 DIAGNOSIS — I10 HTN (HYPERTENSION), MALIGNANT: ICD-10-CM

## 2021-06-25 DIAGNOSIS — C18.1 MALIGNANT NEOPLASM OF APPENDIX VERMIFORMIS (H): ICD-10-CM

## 2021-06-25 LAB
ANION GAP SERPL CALCULATED.3IONS-SCNC: 11 MMOL/L (ref 3–14)
BUN SERPL-MCNC: 19 MG/DL (ref 7–25)
CALCIUM SERPL-MCNC: 9.7 MG/DL (ref 8.6–10.3)
CHLORIDE SERPL-SCNC: 94 MMOL/L (ref 98–107)
CHOLEST SERPL-MCNC: 252 MG/DL
CO2 SERPL-SCNC: 26 MMOL/L (ref 21–31)
CREAT SERPL-MCNC: 1.07 MG/DL (ref 0.7–1.3)
CREAT UR-MCNC: 81 MG/DL
GFR SERPL CREATININE-BSD FRML MDRD: 73 ML/MIN/{1.73_M2}
GLUCOSE SERPL-MCNC: 444 MG/DL (ref 70–105)
HBA1C MFR BLD: 13.4 % (ref 4–6)
HDLC SERPL-MCNC: 34 MG/DL (ref 23–92)
LDLC SERPL CALC-MCNC: 159 MG/DL
MICROALBUMIN UR-MCNC: 843 MG/L
MICROALBUMIN/CREAT UR: 1035.63 MG/G CR (ref 0–17)
NONHDLC SERPL-MCNC: 218 MG/DL
POTASSIUM SERPL-SCNC: 3.7 MMOL/L (ref 3.5–5.1)
SODIUM SERPL-SCNC: 131 MMOL/L (ref 134–144)
TRIGL SERPL-MCNC: 297 MG/DL

## 2021-06-25 PROCEDURE — 36415 COLL VENOUS BLD VENIPUNCTURE: CPT | Mod: ZL | Performed by: FAMILY MEDICINE

## 2021-06-25 PROCEDURE — 99214 OFFICE O/P EST MOD 30 MIN: CPT | Performed by: FAMILY MEDICINE

## 2021-06-25 PROCEDURE — 83036 HEMOGLOBIN GLYCOSYLATED A1C: CPT | Mod: ZL | Performed by: FAMILY MEDICINE

## 2021-06-25 PROCEDURE — 82043 UR ALBUMIN QUANTITATIVE: CPT | Mod: ZL | Performed by: FAMILY MEDICINE

## 2021-06-25 PROCEDURE — 80307 DRUG TEST PRSMV CHEM ANLYZR: CPT | Mod: ZL | Performed by: FAMILY MEDICINE

## 2021-06-25 PROCEDURE — 80048 BASIC METABOLIC PNL TOTAL CA: CPT | Mod: ZL | Performed by: FAMILY MEDICINE

## 2021-06-25 PROCEDURE — 80061 LIPID PANEL: CPT | Mod: ZL | Performed by: FAMILY MEDICINE

## 2021-06-25 RX ORDER — OXYCODONE AND ACETAMINOPHEN 5; 325 MG/1; MG/1
1 TABLET ORAL EVERY 4 HOURS PRN
Qty: 90 TABLET | Refills: 0 | Status: SHIPPED | OUTPATIENT
Start: 2021-06-25 | End: 2021-07-28

## 2021-06-25 RX ORDER — INSULIN GLARGINE 100 [IU]/ML
20 INJECTION, SOLUTION SUBCUTANEOUS DAILY
Qty: 30 ML | Refills: 3 | Status: SHIPPED | OUTPATIENT
Start: 2021-06-25 | End: 2022-03-14

## 2021-06-25 RX ORDER — FUROSEMIDE 40 MG
40 TABLET ORAL DAILY
Qty: 90 TABLET | Refills: 3 | Status: SHIPPED | OUTPATIENT
Start: 2021-06-25 | End: 2022-03-14

## 2021-06-25 RX ORDER — TRAMADOL HYDROCHLORIDE 50 MG/1
50 TABLET ORAL 4 TIMES DAILY
Qty: 90 TABLET | Refills: 5 | Status: SHIPPED | OUTPATIENT
Start: 2021-06-25 | End: 2022-03-14

## 2021-06-25 RX ORDER — CARVEDILOL 25 MG/1
25 TABLET ORAL 2 TIMES DAILY WITH MEALS
Qty: 180 TABLET | Refills: 3 | Status: SHIPPED | OUTPATIENT
Start: 2021-06-25 | End: 2022-03-14

## 2021-06-25 RX ORDER — LISINOPRIL 40 MG/1
40 TABLET ORAL DAILY
Qty: 90 TABLET | Refills: 3 | Status: SHIPPED | OUTPATIENT
Start: 2021-06-25 | End: 2022-07-27

## 2021-06-25 RX ORDER — TERAZOSIN 2 MG/1
2 CAPSULE ORAL AT BEDTIME
Qty: 90 CAPSULE | Refills: 3 | Status: SHIPPED | OUTPATIENT
Start: 2021-06-25 | End: 2022-07-27

## 2021-06-25 ASSESSMENT — ANXIETY QUESTIONNAIRES
2. NOT BEING ABLE TO STOP OR CONTROL WORRYING: NOT AT ALL
6. BECOMING EASILY ANNOYED OR IRRITABLE: NOT AT ALL
3. WORRYING TOO MUCH ABOUT DIFFERENT THINGS: NOT AT ALL
7. FEELING AFRAID AS IF SOMETHING AWFUL MIGHT HAPPEN: NOT AT ALL
5. BEING SO RESTLESS THAT IT IS HARD TO SIT STILL: NOT AT ALL
1. FEELING NERVOUS, ANXIOUS, OR ON EDGE: NOT AT ALL
GAD7 TOTAL SCORE: 0

## 2021-06-25 ASSESSMENT — PATIENT HEALTH QUESTIONNAIRE - PHQ9
SUM OF ALL RESPONSES TO PHQ QUESTIONS 1-9: 0
5. POOR APPETITE OR OVEREATING: NOT AT ALL

## 2021-06-25 ASSESSMENT — PAIN SCALES - GENERAL: PAINLEVEL: SEVERE PAIN (6)

## 2021-06-25 ASSESSMENT — MIFFLIN-ST. JEOR: SCORE: 2236.11

## 2021-06-25 NOTE — LETTER
Opioid / Opioid Plus Controlled Substance Agreement    This is an agreement between you and your provider about the safe and appropriate use of controlled substance/opioids prescribed by your care team. Controlled substances are medicines that can cause physical and mental dependence (abuse).    There are strict laws about having and using these medicines. We here at [facility] are committing to working with you in your efforts to get better. To support you in this work, we ll help you schedule regular office appointments for medicine refills. If we must cancel or change your appointment for any reason, we ll make sure you have enough medicine to last until your next appointment.     As a Provider, I will:    Listen carefully to your concerns and treat you with respect.     Recommend a treatment plan that I believe is in your best interest. This plan may involve therapies other than opioid pain medication.     Talk with you often about the possible benefits, and the risk of harm of any medicine that we prescribe for you.     Provide a plan on how to taper (discontinue or go off) using this medicine if the decision is made to stop its use.    As a Patient, I understand that opioid(s):     Are a controlled substance prescribed by my care team to help me function or work and manage my condition(s).     Are strong medicines and can cause serious side effects such as:    Drowsiness, which can seriously affect my driving ability    A lower breathing rate, enough to cause death    Harm to my thinking ability     Depression     Abuse of and addiction to this medicine    Need to be taken exactly as prescribed. Combining opioids with certain medicines or chemicals (such as illegal drugs, sedatives, sleeping pills, and benzodiazepines) can be dangerous or even fatal. If I stop opioids suddenly, I may have severe withdrawal symptoms.    Do not work for all types of pain nor for all patients. If they re not helpful, I may  be asked to stop them.        The risks, benefits and side effects of these medicine(s) were explained to me. I agree that:  1. I will take part in other treatments as advised by my care team. This may be psychiatry or counseling, physical therapy, behavioral therapy, group treatment or a referral to a specialist.     2. I will keep all my appointments. I understand that this is part of the monitoring of opioids. My care team may require an office visit for EVERY opioid/controlled substance refill. If I miss appointments or don t follow instructions, my care team may stop my medicine.    3. I will take my medicines as prescribed. I will not change the dose or schedule unless my care team tells me to. There will be no refills if I run out early.     4. I may be asked to come to the clinic and complete a urine drug test or complete a pill count at any time. If I don t give a urine sample or participate in a pill count, the care team may stop my medicine.    5. I will only receive prescriptions from this clinic for chronic pain. If I am treated by another provider for acute pain issues, I will tell them that I am taking opioid pain medication for chronic pain and that I have a treatment agreement with this provider. I will inform my [facility] care team within one business day if I am given a prescription for any pain medication by another healthcare provider. My [facility] care team can contact other providers and pharmacists about my use of any medicines.    6. It is up to me to make sure that I don t run out of my medicines on weekends or holidays. If my care team is willing to refill my opioid prescription without a visit, I must request refills only during office hours. Refills may take up to 3 business days to process. I will use one pharmacy to fill all my opioid and other controlled substance prescriptions. I will notify the clinic about any changes to my insurance or medication  availability.    7. I am responsible for my prescriptions. If the medicine/prescription is lost, stolen or destroyed, it will not be replaced. I also agree not to share controlled substance medicines with anyone.    8. I am aware I should not use any illegal or recreational drugs. I agree not to drink alcohol unless my care team says I can.       9. If I enroll in the [place] Medical Cannabis program, I will tell my care team prior to my next refill.     10. I will tell my care team right away if I become pregnant, have a new medical problem treated outside of my regular clinic, or have a change in my medications.    11. I understand that this medicine can affect my thinking, judgment and reaction time. Alcohol and drugs affect the brain and body, which can affect the safety of my driving. Being under the influence of alcohol or drugs can affect my decision-making, behaviors, personal safety, and the safety of others. Driving while impaired (DWI) can occur if a person is driving, operating, or in physical control of a car, motorcycle, boat, snowmobile, ATV, motorbike, off-road vehicle, or any other motor vehicle ([place] Statute 169A.20). I understand the risk if I choose to drive or operate any vehicle or machinery.    I understand that if I do not follow any of the conditions above, my prescriptions or treatment may be stopped or changed.          Opioids  What You Need to Know    What are opioids?   Opioids are pain medicines that must be prescribed by a doctor. They are also known as narcotics.     Examples are:   1. morphine (MS Contin, Yuli)  2. oxycodone (Oxycontin)  3. oxycodone and acetaminophen (Percocet)  4. hydrocodone and acetaminophen (Vicodin, Norco)   5. fentanyl patch (Duragesic)   6. hydromorphone (Dilaudid)   7. methadone  8. codeine (Tylenol #3)     What do opioids do well?   Opioids are best for severe short-term pain such as after a surgery or injury. They may work well for cancer pain. They may  help some people with long-lasting (chronic) pain.     What do opioids NOT do well?   Opioids never get rid of pain entirely, and they don t work well for most patients with chronic pain. Opioids don t reduce swelling, one of the causes of pain.                                    Other ways to manage chronic pain and improve function include:       Treat the health problem that may be causing pain    Anti-inflammation medicines, which reduce swelling and tenderness, such as ibuprofen (Advil, Motrin) or naproxen (Aleve)    Acetaminophen (Tylenol)    Antidepressants and anti-seizure medicines, especially for nerve pain    Topical treatments such as patches or creams    Injections or nerve blocks    Chiropractic or osteopathic treatment    Acupuncture, massage, deep breathing, meditation, visual imagery, aromatherapy    Use heat or ice at the pain site    Physical therapy     Exercise    Stop smoking    Take part in therapy       Risks and side effects     Talk to your doctor before you start or decide to keep taking opioids. Possible side effects include:      Lowering your breathing rate enough to cause death    Overdose, including death, especially if taking higher than prescribed doses    Worse depression symptoms; less pleasure in things you usually enjoy    Feeling tired or sluggish    Slower thoughts or cloudy thinking    Being more sensitive to pain over time; pain is harder to control    Trouble sleeping or restless sleep    Changes in hormone levels (for example, less testosterone)    Changes in sex drive or ability to have sex    Constipation    Unsafe driving    Itching and sweating    Dizziness    Nausea, throwing up and dry mouth    What else should I know about opioids?    Opioids may lead to dependence, tolerance, or addiction.      Dependence means that if you stop or reduce the medicine too quickly, you will have withdrawal symptoms. These include loose poop (diarrhea), jitters, flu-like symptoms,  nervousness and tremors. Dependence is not the same as addiction.                       Tolerance means needing higher doses over time to get the same effect. This may increase the chance of serious side effects.      Addiction is when people improperly use a substance that harms their body, their mind or their relations with others. Use of opiates can cause a relapse of addiction if you have a history of drug or alcohol abuse.      People who have used opioids for a long time may have a lower quality of life, worse depression, higher levels of pain and more visits to doctors.    You can overdose on opioids. Take these steps to lower your risk of overdose:    1. Recognize the signs:  Signs of overdose include decrease or loss of consciousness (blackout), slowed breathing, trouble waking up and blue lips. If someone is worried about overdose, they should call 911.    2. Talk to your doctor about Narcan (naloxone).   If you are at risk for overdose, you may be given a prescription for Narcan. This medicine very quickly reverses the effects of opioids.   If you overdose, a friend or family member can give you Narcan while waiting for the ambulance. They need to know the signs of overdose and how to give Narcan.     3. Don't use alcohol or street drugs.   Taking them with opioids can cause death.    4. Do not take any of these medicines unless your doctor says it s OK. Taking these with opioids can cause death:    Benzodiazepines, such as lorazepam (Ativan), alprazolam (Xanax) or diazepam (Valium)    Muscle relaxers, such as cyclobenzaprine (Flexeril)    Sleeping pills like zolpidem (Ambien)     Other opioids      How to keep you and other people safe while taking opioids:    1. Never share your opioids with others.  Opioid medicines are regulated by the Drug Enforcement Agency (PADDY). Selling or sharing medications is a criminal act.    2. Be sure to store opioids in a secure place, locked up if possible. Young children  can easily swallow them and overdose.    3. When you are traveling with your medicines, keep them in the original bottles. If you use a pill box, be sure you also carry a copy of your medicine list from your clinic or pharmacy.    4. Safe disposal of opioids    Most pharmacies have places to get rid of medicine, called disposal kiosks. Medicine disposal options are also available in every [facility]. Search your county and  medication disposal  to find more options. You can find more details at:  [email or web address]-[email or web address]-medications     I agree that my provider, clinic care team, and pharmacy may work with any city, state or federal law enforcement agency that investigates the misuse, sale, or other diversion of my controlled medicine. I will allow my provider to discuss my care with, or share a copy of, this agreement with any other treating provider, pharmacy or emergency room where I receive care.    I have read this agreement and have asked questions about anything I did not understand.    _______________________________________________________  Patient Signature - Jaswant Chong _____________________                   Date     _______________________________________________________  Provider Signature - Mik Kumari MD   _____________________                   Date     _______________________________________________________  Witness Signature (required if provider not present while patient signing)   _____________________                   Date

## 2021-06-25 NOTE — NURSING NOTE
Patient here for medication refills, labs and pain in the rt/lt hip joints. He will be going out of the country to [place] for 2 weeks. Medication Reconciliation: complete.    Aparna Patton LPN  6/25/2021 9:39 AM

## 2021-06-25 NOTE — PROGRESS NOTES
"    Assessment & Plan   Problem List Items Addressed This Visit        Digestive    Malignant neoplasm of appendix vermiformis (H)    Relevant Medications    oxyCODONE-acetaminophen (PERCOCET) 5-325 MG tablet    traMADol (ULTRAM) 50 MG tablet       Endocrine    Diabetes mellitus without complication (H) - Primary    Relevant Medications    insulin glargine (BASAGLAR KWIKPEN) 100 UNIT/ML pen    Other Relevant Orders    Hemoglobin A1c    Albumin Random Urine Quantitative with Creat Ratio    Lipid Panel       Circulatory    HTN (hypertension), malignant    Relevant Medications    carvedilol (COREG) 25 MG tablet    furosemide (LASIX) 40 MG tablet    lisinopril (ZESTRIL) 40 MG tablet    terazosin (HYTRIN) 2 MG capsule    Other Relevant Orders    Basic Metabolic Panel         Medical compliance is a large issue here. I offered him a hip x-ray, he wants to wait for after his pending trip.   reviewed, refilled the tramadol and oxycodone for the cancer pain.  Contract signed.    Regarding the hypertension, he is off hs meds, so it is hard to really know what to do in regards to starting even more.  I did add on the hytrin too given how high the readings are. In the past he has had mild hyperaldosteronism as well, and has responded to aldactone.  If we can get him compliant with the current meds and blood pressure still elevatted, then would add on aldactone.     Regarding the diabetes, again, limited interest on his part in addressing this.  I advised resuming his meds and injections.              BMI:   Estimated body mass index is 36.98 kg/m  as calculated from the following:    Height as of this encounter: 1.88 m (6' 2\").    Weight as of this encounter: 130.6 kg (288 lb).           No follow-ups on file.    Mik Kumari MD  [facility]    Ariane Giraldo is a 50 year old who presents for the following health issues     HPI follow up in his knee pain, cancer related ventral hernia, and diabetes. "      He is getting ongoing left sided incisional pain, has used oxycodone off and on for this, last fill was in December.  Would like more.  Leaving for [place] in a few days for a few weeks. Uses the percocet pretty rarely, less than on daily.  Sharp pains in the obliques, with twisting especially.      Has significant diabetes.  At times pays more attention than others.  Has not checked sugars for a while, months.  Checks blood pressure perhaps every 2 weeks, often as high 180/90. No chest pain or shortness of breath.    Getting more pain in right hip, and in the groin area.  At times cannot abduct it, can have a hard time weight bearing a few days ago.  The pain is now resolved.  Last hip x-ray was in 2017, of left hip with mild djd noted then on the left side.      He has not been taking his blood pressure meds at all, ran out.    PHQ 9/17/2015 6/25/2021   PHQ-9 Total Score 5 0   Q9: Thoughts of better off dead/self-harm past 2 weeks Not at all Not at all     MESFIN-7 SCORE 8/14/2020 12/17/2020 6/25/2021   Total Score 0 0 0         Current Outpatient Medications   Medication Instructions     aspirin (ASA) 81 mg, Oral, DAILY     blood glucose (COOL BLOOD GLUCOSE TEST STRIPS) test strip Use to test blood sugar 2 times daily or as directed.     blood glucose monitoring (CONTOUR NEXT TEST) test strip Use to test blood sugar 2 times daily or as directed.     Blood Glucose Monitoring Suppl (FIFTY50 GLUCOSE METER 2.0) w/Device KIT Check twice daily     Blood Pressure KIT Blood pressure machine     carvedilol (COREG) 25 mg, Oral, 2 TIMES DAILY WITH MEALS     furosemide (LASIX) 40 mg, Oral, DAILY     insulin glargine 20 Units, Subcutaneous, DAILY     insulin pen needle (BD VELIA U/F) 32G X 4 MM miscellaneous INJECTING THREE TIMES DAILY BEFORE MEALS     lancets 28G MISC Dispense item covered by pt ins. E11.9 NIDDM type II - Test 3 times/day, Reason: Unstable diabetes     lisinopril (ZESTRIL) 40 mg, Oral, DAILY     metFORMIN  "(GLUCOPHAGE) 1,000 mg, Oral, 2 TIMES DAILY WITH MEALS     oxyCODONE-acetaminophen (PERCOCET) 5-325 MG tablet 1 tablet, Oral, EVERY 4 HOURS PRN     rosuvastatin (CRESTOR) 20 mg, Oral, DAILY     traMADol (ULTRAM) 50 mg, Oral, 4 TIMES DAILY     traZODone (DESYREL) 50 mg, Oral, AT BEDTIME     Recent Labs   Lab Test 12/17/20  1342 08/14/20  1332 01/02/20  1015 11/08/19  1146 05/30/18  1110 05/30/18  1110   A1C 13.5* 12.0*  --  12.5*   < > 13.6*   LDL  --  Cannot estimate LDL when triglyceride exceeds 400 mg/dL 106*  --   --  140*   HDL  --  29 36  --   --  31   TRIG  --  405* 158*  --   --  318*   ALT 34  --  17 43   < > 56*   CR 0.97 1.05 1.02 1.02   < > 0.88   GFRESTIMATED 82 75 78 Not Calculated   < > >90   GFRESTBLACK >90 >90 >90 Not Calculated   < > >90   POTASSIUM 4.5 4.2 4.0 4.0   < > 4.0    < > = values in this interval not displayed.                    Review of Systems         Objective    BP (!) 200/140   Pulse 76   Temp 97.8  F (36.6  C)   Resp 16   Ht 1.88 m (6' 2\")   Wt 130.6 kg (288 lb)   SpO2 97%   BMI 36.98 kg/m    Body mass index is 36.98 kg/m .  Physical Exam  Constitutional:       Appearance: Normal appearance.   Cardiovascular:      Rate and Rhythm: Normal rate and regular rhythm.      Heart sounds: No murmur. No friction rub. No gallop.    Pulmonary:      Effort: Pulmonary effort is normal. No respiratory distress.      Breath sounds: Normal breath sounds. No stridor.   Abdominal:      General: Abdomen is flat.      Tenderness: There is abdominal tenderness.      Hernia: A hernia is present.      Comments: Very large ventral hernia, perhaps 30 x 30 cm or so.     Neurological:      General: No focal deficit present.      Mental Status: He is alert and oriented to person, place, and time.   Psychiatric:         Mood and Affect: Mood normal.         Behavior: Behavior normal.         Thought Content: Thought content normal.        Sensory exam of the foot is normal, tested with the monofilament. " Good pulses, no lesions or ulcers, good peripheral pulses.        Results for orders placed or performed in visit on 06/25/21   Drug Confirmation Panel Urine with Creat     Status: None (In process)   Result Value Ref Range    Creatinine Urine 82 mg/dL    6-Acetylmorphine (6-KAYLENE) ng/mL PENDING <5 ng/mL    6-Acetylmorphine (6-KAYLENE) PENDING ABS^Absent ng/mg[creat]    Codeine ng/mL PENDING <50 ng/mL    Codeine PENDING ABS^Absent ng/mg[creat]    Dihydrocodeine ng/mL PENDING <50 ng/mL    Dihydrocodeine PENDING ABS^Absent ng/mg[creat]    Methadone Metabolite (EDDP) ng/mL PENDING <50 ng/mL    Methadone Metabolite (EDDP) PENDING ABS^Absent ng/mg[creat]    Fentanyl ng/mL PENDING <3 ng/mL    Fentanyl PENDING ABS^Absent ng/mg[creat]    Hydrocodone ng/mL PENDING <50 ng/mL    Hydrocodone PENDING ABS^Absent ng/mg[creat]    Hydromorphone ng/mL PENDING <50 ng/mL    Hydromorphone PENDING ABS^Absent ng/mg[creat]    Meperidine (Demerol) ng/mL PENDING <50 ng/mL    Meperidine (Demerol) PENDING ABS^Absent ng/mg[creat]    Methadone ng/mL PENDING <50 ng/mL    Methadone PENDING ABS^Absent ng/mg[creat]    Morphine ng/mL PENDING <50 ng/mL    Morphine PENDING ABS^Absent ng/mg[creat]    Naltrexone ng/mL PENDING <50 ng/mL    Naltrexone PENDING ABS^Absent ng/mg[creat]    N-Desmethyl Tapentadol PENDING ABS^Absent    Norcodeine ng/mL PENDING <50 ng/mL    Norcodeine PENDING ABS^Absent ng/mg[creat]    Norfentanyl ng/mL PENDING <5 ng/mL    Norfentanyl PENDING ABS^Absent ng/mg[creat]    Normeperine ng/mL PENDING <50 ng/mL    Normeperine PENDING ABS^Absent ng/mg[creat]    O-Desmethyl Tramadol ng/mL PENDING <50 ng/mL    O-Desmethyl Tramadol PENDING ABS^Absent ng/mg[creat]    Oxycodone ng/mL PENDING <50 ng/mL    Oxycodone PENDING ABS^Absent ng/mg[creat]    Oxymorphone ng/mL PENDING <50 ng/mL    Oxymorphone PENDING ABS^Absent ng/mg[creat]    Propoxyphene PENDING ABS^Absent    Sufentanil ng/mL PENDING <10 ng/mL    Sufentanil PENDING ABS^Absent ng/mg[creat]     Tapentadol PENDING ABS^Absent    Thebaine ng/mL PENDING <50 ng/mL    Thebaine PENDING ABS^Absent ng/mg[creat]    Tramadol ng/mL PENDING <50 ng/mL    Tramadol PENDING ABS^Absent ng/mg[creat]    7-Aminoclonazepam PENDING ABS^Absent    7-Aminoflunitrazepam PENDING ABS^Absent    Alprazolam PENDING ABS^Absent    Alpha-hydroxyalprazolam PENDING ABS^Absent    Alpha-hydroxymidazolam PENDING ABS^Absent    Alpha-hydroxytriazolam PENDING ABS^Absent    Clonazepam PENDING ABS^Absent    Diazepam (Valium) PENDING ABS^Absent    Lorazepam PENDING ABS^Absent    Nordiazepam PENDING ABS^Absent    Oxazepam PENDING ABS^Absent    Temazepam PENDING ABS^Absent    Gabapentin PENDING ABS^Absent    Pregabalin PENDING ABS^Absent    Amphetamine ng/mL PENDING <50 ng/mL    Amphetamine PENDING ABS^Absent ng/mg[creat]    3,4-Methylenedioxyamphetamine (MDA) ng/mL PENDING <50 ng/mL    3,4-Methylenedioxyamphetamine (MDA) PENDING ABS^Absent ng/mg[creat]    Methylenedioxyethamphetamine (MDEA) ng/mL PENDING <50 ng/mL    Methylenedioxyethamphetamine (MDEA) PENDING ABS^Absent ng/mg[creat]    Methylenedioxyamphetamine (MDMA) ng/mL PENDING <50 ng/mL    Methylenedioxyamphetamine Ecstasy (MDMA) PENDING ABS^Absent ng/mg[creat]    Methamphetamine ng/mL PENDING <50 ng/mL    Methamphetamine PENDING ABS^Absent ng/mg[creat]    Methylphenidate (Ritalin) ng/mL PENDING <50 ng/mL    Methylphenidate (Ritalin) PENDING ABS^Absent ng/mg[creat]    Methylphenidate Mtb (Ritalinic Acid) ng/mL PENDING <50 ng/mL    Methylphenidate Metabolite (Ritalinic Acid) PENDING ABS^Absent ng/mg[creat]    Cocaine Metabolite (Benzoylecgonine) ng/mL PENDING <50 ng/mL    Cocaine Metabolite (Benzoylegonine) PENDING ABS^Absent ng/mg[creat]    Cocaine Metabolite (Cocaethylene) ng/mL PENDING <50 ng/mL    Cocaine Metabolite (Cocaethylene) PENDING ABS^Absent ng/mg[creat]    Ketamine PENDING ABS^Absent    Phencyclidine (PCP) PENDING ABS^Absent    Buprenorphine ng/mL PENDING <5 ng/mL    Buprenorphine  PENDING ABS^Absent ng/mg[creat]    Norbuprenorphine ng/mL PENDING <5 ng/mL    Norbuprenorphine PENDING ABS^Absent ng/mg[creat]    Naloxone ng/mL PENDING <5 ng/mL    Naloxone PENDING ABS^Absent ng/mg[creat]    Comp Urine Drug Confirmation Comments PENDING    Basic Metabolic Panel     Status: Abnormal   Result Value Ref Range    Sodium 131 (L) 134 - 144 mmol/L    Potassium 3.7 3.5 - 5.1 mmol/L    Chloride 94 (L) 98 - 107 mmol/L    Carbon Dioxide 26 21 - 31 mmol/L    Anion Gap 11 3 - 14 mmol/L    Glucose 444 (H) 70 - 105 mg/dL    Urea Nitrogen 19 7 - 25 mg/dL    Creatinine 1.07 0.70 - 1.30 mg/dL    GFR Estimate 73 >60 mL/min/[1.73_m2]    GFR Estimate If Black 89 >60 mL/min/[1.73_m2]    Calcium 9.7 8.6 - 10.3 mg/dL   Lipid Panel     Status: Abnormal   Result Value Ref Range    Cholesterol 252 (H) <200 mg/dL    Triglycerides 297 (H) <150 mg/dL    HDL Cholesterol 34 23 - 92 mg/dL    LDL Cholesterol Calculated 159 (H) <100 mg/dL    Non HDL Cholesterol 218 (H) <130 mg/dL   Hemoglobin A1c     Status: Abnormal   Result Value Ref Range    Hemoglobin A1C 13.4 (H) 4.0 - 6.0 %   Albumin Random Urine Quantitative with Creat Ratio     Status: Abnormal   Result Value Ref Range    Creatinine Urine 81 mg/dL    Albumin Urine mg/L 843 mg/L    Albumin Urine mg/g Cr 1,035.63 (H) 0 - 17 mg/g Cr

## 2021-06-26 ASSESSMENT — ANXIETY QUESTIONNAIRES: GAD7 TOTAL SCORE: 0

## 2021-06-29 LAB
CREAT UR-MCNC: 82 MG/DL
RPT COMMENT: NORMAL

## 2021-07-28 DIAGNOSIS — C18.1 MALIGNANT NEOPLASM OF APPENDIX VERMIFORMIS (H): ICD-10-CM

## 2021-07-28 RX ORDER — OXYCODONE AND ACETAMINOPHEN 5; 325 MG/1; MG/1
1 TABLET ORAL EVERY 4 HOURS PRN
Qty: 90 TABLET | Refills: 0 | Status: SHIPPED | OUTPATIENT
Start: 2021-07-28 | End: 2022-06-07

## 2021-09-04 ENCOUNTER — HEALTH MAINTENANCE LETTER (OUTPATIENT)
Age: 51
End: 2021-09-04

## 2022-02-19 ENCOUNTER — HEALTH MAINTENANCE LETTER (OUTPATIENT)
Age: 52
End: 2022-02-19

## 2022-03-14 ENCOUNTER — TELEPHONE (OUTPATIENT)
Dept: FAMILY MEDICINE | Facility: OTHER | Age: 52
End: 2022-03-14
Payer: COMMERCIAL

## 2022-03-14 DIAGNOSIS — E11.9 DIABETES MELLITUS WITHOUT COMPLICATION (H): ICD-10-CM

## 2022-03-14 DIAGNOSIS — C18.1 MALIGNANT NEOPLASM OF APPENDIX VERMIFORMIS (H): ICD-10-CM

## 2022-03-14 DIAGNOSIS — I10 HTN (HYPERTENSION), MALIGNANT: ICD-10-CM

## 2022-03-14 RX ORDER — CARVEDILOL 25 MG/1
25 TABLET ORAL 2 TIMES DAILY WITH MEALS
Qty: 180 TABLET | Refills: 3 | Status: SHIPPED | OUTPATIENT
Start: 2022-03-14 | End: 2023-04-24

## 2022-03-14 RX ORDER — FUROSEMIDE 40 MG
40 TABLET ORAL DAILY
Qty: 90 TABLET | Refills: 3 | Status: SHIPPED | OUTPATIENT
Start: 2022-03-14 | End: 2023-04-24

## 2022-03-14 RX ORDER — OXYCODONE AND ACETAMINOPHEN 5; 325 MG/1; MG/1
1 TABLET ORAL EVERY 4 HOURS PRN
Qty: 90 TABLET | Refills: 0 | OUTPATIENT
Start: 2022-03-14

## 2022-03-14 RX ORDER — TRAMADOL HYDROCHLORIDE 50 MG/1
50 TABLET ORAL 4 TIMES DAILY
Qty: 90 TABLET | Refills: 0 | Status: SHIPPED | OUTPATIENT
Start: 2022-03-14 | End: 2022-05-26

## 2022-03-14 RX ORDER — INSULIN GLARGINE 100 [IU]/ML
20 INJECTION, SOLUTION SUBCUTANEOUS DAILY
Qty: 30 ML | Refills: 3 | Status: SHIPPED | OUTPATIENT
Start: 2022-03-14 | End: 2022-06-13

## 2022-03-14 NOTE — TELEPHONE ENCOUNTER
Called patient and verified name and date of birth. Patient reports he is leaving on Wednesday for a hunting trip. He reports he will be out of the medication before returning. Pending refills. I advised him he needs an appointment for controlled substances.   Requested this be sent to Dr. Kumrai for consideration.  Emilia Mclain RN on 3/14/2022 at 9:59 AM

## 2022-03-14 NOTE — TELEPHONE ENCOUNTER
Reason for call: Medication or medication refill    Name of medication requested: tramadol and diabetes medications-leaving town    Are you out of the medication? yes    What pharmacy do you use? [facility] [place] [facility]    Preferred method for responding to this message: Telephone Call    Phone number patient can be reached at: Cell number on file:    Telephone Information:   Mobile [phone]       If we cannot reach you directly, may we leave a detailed response at the number you provided? Yes

## 2022-04-27 ENCOUNTER — TELEPHONE (OUTPATIENT)
Dept: FAMILY MEDICINE | Facility: OTHER | Age: 52
End: 2022-04-27
Payer: COMMERCIAL

## 2022-04-27 DIAGNOSIS — N41.0 ACUTE PROSTATITIS: Primary | ICD-10-CM

## 2022-04-27 NOTE — TELEPHONE ENCOUNTER
Patient went to the walk in clinic in [place] several days ago.  Patient was given Amoxicillin for an enlarged prostate and feels like things are not getting any better.    Patient would like to get worked into Mik Kumari MD schedule for Friday if possible.    Karla Don LPN 4/27/2022 5:00 PM

## 2022-04-27 NOTE — TELEPHONE ENCOUNTER
Patient asked that Jailyn call him back on a medical question today or tomorrow-did not want to be specific

## 2022-04-29 RX ORDER — DOXYCYCLINE 100 MG/1
100 CAPSULE ORAL 2 TIMES DAILY
Qty: 42 CAPSULE | Refills: 0 | Status: SHIPPED | OUTPATIENT
Start: 2022-04-29 | End: 2022-05-20

## 2022-04-29 NOTE — TELEPHONE ENCOUNTER
Call him today, no room in schedule.  I will fax in a different med, doxy, for the prostate. Can take many weeks for antibiotics to get into the prostate effectively.  Mik Kumari MD on 4/29/2022 at 7:38 AM

## 2022-04-30 ENCOUNTER — TRANSFERRED RECORDS (OUTPATIENT)
Dept: HEALTH INFORMATION MANAGEMENT | Facility: OTHER | Age: 52
End: 2022-04-30
Payer: COMMERCIAL

## 2022-05-24 ENCOUNTER — TELEPHONE (OUTPATIENT)
Dept: FAMILY MEDICINE | Facility: OTHER | Age: 52
End: 2022-05-24
Payer: COMMERCIAL

## 2022-05-24 NOTE — TELEPHONE ENCOUNTER
Called and spoke with patient he has multiple concerns to discuss with . I suggest ed to call Friday morning at 7 am for an appt and than transferred to the appt line to schedule a long appt as well. Aparna Patton LPN .......................5/24/2022  9:52 AM

## 2022-05-24 NOTE — TELEPHONE ENCOUNTER
TJP-pt would like to speak with nurse. Says personal-would not tell me the reason. Thank you.  Brittanie Seaman

## 2022-05-26 ENCOUNTER — OFFICE VISIT (OUTPATIENT)
Dept: FAMILY MEDICINE | Facility: OTHER | Age: 52
End: 2022-05-26
Attending: FAMILY MEDICINE
Payer: COMMERCIAL

## 2022-05-26 VITALS
DIASTOLIC BLOOD PRESSURE: 74 MMHG | RESPIRATION RATE: 16 BRPM | BODY MASS INDEX: 35.31 KG/M2 | WEIGHT: 275 LBS | TEMPERATURE: 98 F | HEART RATE: 76 BPM | SYSTOLIC BLOOD PRESSURE: 136 MMHG | OXYGEN SATURATION: 98 %

## 2022-05-26 DIAGNOSIS — C18.1 MALIGNANT NEOPLASM OF APPENDIX VERMIFORMIS (H): ICD-10-CM

## 2022-05-26 DIAGNOSIS — Z79.4 TYPE 2 DIABETES MELLITUS WITH HYPERGLYCEMIA, WITH LONG-TERM CURRENT USE OF INSULIN (H): ICD-10-CM

## 2022-05-26 DIAGNOSIS — K43.2 RECURRENT VENTRAL HERNIA: ICD-10-CM

## 2022-05-26 DIAGNOSIS — E11.65 TYPE 2 DIABETES MELLITUS WITH HYPERGLYCEMIA, WITH LONG-TERM CURRENT USE OF INSULIN (H): ICD-10-CM

## 2022-05-26 DIAGNOSIS — E11.9 DIABETES MELLITUS WITHOUT COMPLICATION (H): Primary | ICD-10-CM

## 2022-05-26 DIAGNOSIS — Z23 NEED FOR VIRAL IMMUNIZATION: ICD-10-CM

## 2022-05-26 DIAGNOSIS — N41.0 ACUTE PROSTATITIS: ICD-10-CM

## 2022-05-26 DIAGNOSIS — I83.893 VARICOSE VEINS OF BILATERAL LOWER EXTREMITIES WITH OTHER COMPLICATIONS: ICD-10-CM

## 2022-05-26 DIAGNOSIS — I10 HTN (HYPERTENSION), MALIGNANT: ICD-10-CM

## 2022-05-26 DIAGNOSIS — M54.50 ACUTE BILATERAL LOW BACK PAIN WITHOUT SCIATICA: ICD-10-CM

## 2022-05-26 LAB
ALBUMIN UR-MCNC: 600 MG/DL
ALT SERPL W P-5'-P-CCNC: 25 U/L (ref 7–52)
ANION GAP SERPL CALCULATED.3IONS-SCNC: 8 MMOL/L (ref 3–14)
APPEARANCE UR: CLEAR
BACTERIA #/AREA URNS HPF: ABNORMAL /HPF
BILIRUB UR QL STRIP: NEGATIVE
BUN SERPL-MCNC: 13 MG/DL (ref 7–25)
CALCIUM SERPL-MCNC: 9.9 MG/DL (ref 8.6–10.3)
CHLORIDE BLD-SCNC: 96 MMOL/L (ref 98–107)
CO2 SERPL-SCNC: 28 MMOL/L (ref 21–31)
COLOR UR AUTO: YELLOW
CREAT SERPL-MCNC: 0.91 MG/DL (ref 0.7–1.3)
ERYTHROCYTE [DISTWIDTH] IN BLOOD BY AUTOMATED COUNT: 13.2 % (ref 10–15)
GFR SERPL CREATININE-BSD FRML MDRD: >90 ML/MIN/1.73M2
GLUCOSE BLD-MCNC: 286 MG/DL (ref 70–105)
GLUCOSE UR STRIP-MCNC: 1000 MG/DL
GRANULAR CAST: 1 /LPF
HBA1C MFR BLD: 12.3 % (ref 0–5.6)
HCT VFR BLD AUTO: 45.9 % (ref 40–53)
HGB BLD-MCNC: 15.6 G/DL (ref 13.3–17.7)
HGB UR QL STRIP: NEGATIVE
HYALINE CASTS: 4 /LPF
KETONES UR STRIP-MCNC: NEGATIVE MG/DL
LEUKOCYTE ESTERASE UR QL STRIP: ABNORMAL
MCH RBC QN AUTO: 31.6 PG (ref 26.5–33)
MCHC RBC AUTO-ENTMCNC: 34 G/DL (ref 31.5–36.5)
MCV RBC AUTO: 93 FL (ref 78–100)
MUCOUS THREADS #/AREA URNS LPF: PRESENT /LPF
NITRATE UR QL: NEGATIVE
PH UR STRIP: 6 [PH] (ref 5–9)
PLATELET # BLD AUTO: 328 10E3/UL (ref 150–450)
POTASSIUM BLD-SCNC: 4.5 MMOL/L (ref 3.5–5.1)
PSA SERPL-MCNC: 1.02 UG/L (ref 0–4)
RBC # BLD AUTO: 4.93 10E6/UL (ref 4.4–5.9)
RBC URINE: 4 /HPF
SODIUM SERPL-SCNC: 132 MMOL/L (ref 134–144)
SP GR UR STRIP: 1.03 (ref 1–1.03)
UROBILINOGEN UR STRIP-MCNC: 2 MG/DL
WBC # BLD AUTO: 8.9 10E3/UL (ref 4–11)
WBC URINE: 55 /HPF

## 2022-05-26 PROCEDURE — 36415 COLL VENOUS BLD VENIPUNCTURE: CPT | Mod: ZL | Performed by: FAMILY MEDICINE

## 2022-05-26 PROCEDURE — 87086 URINE CULTURE/COLONY COUNT: CPT | Mod: ZL | Performed by: FAMILY MEDICINE

## 2022-05-26 PROCEDURE — 99214 OFFICE O/P EST MOD 30 MIN: CPT | Mod: 25 | Performed by: FAMILY MEDICINE

## 2022-05-26 PROCEDURE — 0054A COVID-19,PF,PFIZER (12+ YRS): CPT | Performed by: FAMILY MEDICINE

## 2022-05-26 PROCEDURE — 84153 ASSAY OF PSA TOTAL: CPT | Mod: ZL | Performed by: FAMILY MEDICINE

## 2022-05-26 PROCEDURE — 91305 COVID-19,PF,PFIZER (12+ YRS): CPT | Performed by: FAMILY MEDICINE

## 2022-05-26 PROCEDURE — 83036 HEMOGLOBIN GLYCOSYLATED A1C: CPT | Mod: ZL | Performed by: FAMILY MEDICINE

## 2022-05-26 PROCEDURE — 84460 ALANINE AMINO (ALT) (SGPT): CPT | Mod: ZL | Performed by: FAMILY MEDICINE

## 2022-05-26 PROCEDURE — 80051 ELECTROLYTE PANEL: CPT | Mod: ZL | Performed by: FAMILY MEDICINE

## 2022-05-26 PROCEDURE — 81001 URINALYSIS AUTO W/SCOPE: CPT | Mod: ZL | Performed by: FAMILY MEDICINE

## 2022-05-26 PROCEDURE — 85027 COMPLETE CBC AUTOMATED: CPT | Mod: ZL | Performed by: FAMILY MEDICINE

## 2022-05-26 RX ORDER — TRAMADOL HYDROCHLORIDE 50 MG/1
50 TABLET ORAL 4 TIMES DAILY
Qty: 90 TABLET | Refills: 5 | Status: SHIPPED | OUTPATIENT
Start: 2022-05-26 | End: 2022-10-28

## 2022-05-26 ASSESSMENT — ANXIETY QUESTIONNAIRES
GAD7 TOTAL SCORE: 0
2. NOT BEING ABLE TO STOP OR CONTROL WORRYING: NOT AT ALL
7. FEELING AFRAID AS IF SOMETHING AWFUL MIGHT HAPPEN: NOT AT ALL
IF YOU CHECKED OFF ANY PROBLEMS ON THIS QUESTIONNAIRE, HOW DIFFICULT HAVE THESE PROBLEMS MADE IT FOR YOU TO DO YOUR WORK, TAKE CARE OF THINGS AT HOME, OR GET ALONG WITH OTHER PEOPLE: NOT DIFFICULT AT ALL
6. BECOMING EASILY ANNOYED OR IRRITABLE: NOT AT ALL
GAD7 TOTAL SCORE: 0
1. FEELING NERVOUS, ANXIOUS, OR ON EDGE: NOT AT ALL
3. WORRYING TOO MUCH ABOUT DIFFERENT THINGS: NOT AT ALL
5. BEING SO RESTLESS THAT IT IS HARD TO SIT STILL: NOT AT ALL

## 2022-05-26 ASSESSMENT — PAIN SCALES - GENERAL: PAINLEVEL: EXTREME PAIN (8)

## 2022-05-26 ASSESSMENT — PATIENT HEALTH QUESTIONNAIRE - PHQ9: 5. POOR APPETITE OR OVEREATING: NOT AT ALL

## 2022-05-26 NOTE — PROGRESS NOTES
"  Assessment & Plan     (E11.9) Diabetes mellitus without complication (H)  (primary encounter diagnosis)  Comment: not controlled. The hyperglycemia is likely contributing to the urinary symptoms, and can also provide a food source for infection.  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  Plan: traMADol (ULTRAM) 50 MG tablet        refilled    (I10) HTN (hypertension), malignant  Comment: stable  Plan:      (E11.65,  Z79.4) Type 2 diabetes mellitus with hyperglycemia, with long-term current use of insulin (H)  Comment:    Plan:      (K43.2) Recurrent ventral hernia  Comment:    Plan: traMADol (ULTRAM) 50 MG tablet             (N41.0) Acute prostatitis  Comment: this seems to be improving. The significant fatigue he has had could be from this, but also from recent COVID  Infection.  No clear evidence of ongong bacterial infection, but IC is pending currently too  Plan: Prostate Specific Antigen, UA reflex to         Microscopic and Culture, Urine Culture             (M54.50) Acute bilateral low back pain without sciatica  Comment: appears more msk, doubt kidney or ureter source based on location being closer to facets, and fact this is positional   Plan: tramadol, heat or ice as needed     Varicose veins are non inflamed, but have a few small, sub 5 mm, thromboses.     (Z23) Need for viral immunization  Comment:    Plan: COVID-19,PF,PFIZER (12+ Yrs GRAY LABEL)                        BMI:   Estimated body mass index is 35.31 kg/m  as calculated from the following:    Height as of 6/25/21: 1.88 m (6' 2\").    Weight as of this encounter: 124.7 kg (275 lb).           Return in about 3 months " (around 8/26/2022).    Mik Kumari MD  [facility]      The fatigue is improving and of course could be from the recent covid in addition to the prostatitis.      Ariane Giraldo is a 51 year old who presents for the following health issues     History of Present Illness       Back Pain:  He presents for follow up of back pain. Patient's back pain is a new problem.    Original cause of back pain: other  First noticed back pain: 1-4 weeks ago  Patient feels back pain: constantlyLocation of back pain:  Right middle of back and left middle of back  Description of back pain: dull ache and gnawing  Back pain spreads: nowhere    Since patient first noticed back pain, pain is: always present, but gets better and worse  Does back pain interfere with his job:  Yes  On a scale of 1-10 (10 being the worst), patient describes pain as:  7  What makes back pain worse: certain positions  Acupuncture: not tried  Acetaminophen: not tried  Activity or exercise: not helpful  Chiropractor:  Not tried  Cold: not tried  Heat: not tried  Massage: not tried  Muscle relaxants: not tried  NSAIDS: helpful  Opioids: not tried  Physical Therapy: not tried  Rest: not tried  Steroid Injection: not tried  Stretching: not helpful  Surgery: not tried  TENS unit: not tried  Topical pain relievers: not tried  Other healthcare providers patient is seeing for back pain: None    Reason for visit:  Tired. Prostate.  Symptom onset:  3-4 weeks ago  Symptom intensity:  Severe  Symptom progression:  Worsening  Had these symptoms before:  No    He eats 2-3 servings of fruits and vegetables daily.He consumes 1 sweetened beverage(s) daily.He exercises with enough effort to increase his heart rate 10 to 19 minutes per day.  He exercises with enough effort to increase his heart rate 3 or less days per week. He is missing 2 dose(s) of medications per week.  He is not taking prescribed medications regularly due to remembering to take and  other.       Acute Illness  Acute illness concerns: back pain  Onset/Duration: weeks  Symptoms:  Fever: no  Chills/Sweats: YES  Headache (location?): YES  Sinus Pressure: no  Conjunctivitis:  no  Ear Pain: no  Rhinorrhea: no  Congestion: no  Sore Throat: no  Cough: no  Wheeze: no  Decreased Appetite: no  Nausea: no  Vomiting: no  Diarrhea: no  Dysuria/Freq.: YES  Dysuria or Hematuria: no  Fatigue/Achiness: YES  Sick/Strep Exposure: no  Therapies tried and outcome: None    He was very ill a few weeks ago, with prostatitis.  Had dysuria, lack of erections and significant fatigue. He lost 8#.  Since then he has remained very tired. Was seen by several providers, urgent care in [facility] emergency department shortly after that visit and changed antibiotics that ultimately helped.  Not checking sugars at al. Has been eating better overall. He testd COVID positive before the prostate issues hit.  With this he had significant cough, took lots of OTC meds, that he feels then caused the prostate issues.  Symptoms now are about 75% resolved.  No fevers.  Cold intolerance.      Now with back pain too, not sure if it is related.  Is usuually in the right upper flank area. Can be on the left side at times too.  No longer has pain with urination. The back pain is affected by movement, like getting out of his car- makes it worse.    Lots of ongoing abdomen hernia pain too, wants a refill on his tramadol.    Left leg with some vein nodularity.  No pain in them. Known varicosities here and uses compression stockings.       Current Outpatient Medications   Medication     aspirin (ASA) 81 MG chewable tablet     blood glucose (COOL BLOOD GLUCOSE TEST STRIPS) test strip     blood glucose monitoring (CONTOUR NEXT TEST) test strip     Blood Glucose Monitoring Suppl (FIFTY50 GLUCOSE METER 2.0) w/Device KIT     Blood Pressure KIT     carvedilol (COREG) 25 MG tablet     furosemide (LASIX) 40 MG tablet     insulin glargine (BASAGLAR KWIKPEN)  100 UNIT/ML pen     insulin pen needle (BD VELIA U/F) 32G X 4 MM miscellaneous     lancets 28G MISC     lisinopril (ZESTRIL) 40 MG tablet     metFORMIN (GLUCOPHAGE) 1000 MG tablet     oxyCODONE-acetaminophen (PERCOCET) 5-325 MG tablet     rosuvastatin (CRESTOR) 20 MG tablet     terazosin (HYTRIN) 2 MG capsule     traMADol (ULTRAM) 50 MG tablet     traZODone (DESYREL) 50 MG tablet     No current facility-administered medications for this visit.         Recent Labs   Lab Test 06/25/21  1046 12/17/20  1342 08/14/20  1332 01/02/20  1015 11/08/19  1146   A1C 13.4* 13.5* 12.0*  --  12.5*   *  --  Cannot estimate LDL when triglyceride exceeds 400 mg/dL 106*  --    HDL 34  --  29 36  --    TRIG 297*  --  405* 158*  --    ALT  --  34  --  17 43   CR 1.07 0.97 1.05 1.02 1.02   GFRESTIMATED 73 82 75 78 Not Calculated   GFRESTBLACK 89 >90 >90 >90 Not Calculated   POTASSIUM 3.7 4.5 4.2 4.0 4.0            Pain History:  When did you first notice your pain? - Chronic Pain   Have you seen this provider for your pain in the past?   Yes   Where in your body do you have pain? herenia  Are you seeing anyone else for your pain? No    PHQ-9 SCORE 9/17/2015 6/25/2021   PHQ-9 Total Score 5 0       MESFIN-7 SCORE 12/17/2020 6/25/2021 5/26/2022   Total Score 0 0 0               Chronic Pain Follow Up:    Location of pain: abdomen   Analgesia/pain control:    - Recent changes:  no    - Overall control: Tolerable with discomfort    - Current treatments: tramadol   Adherence:     - Do you ever take more pain medicine than prescribed? No    - When did you take your last dose of pain medicine?  today   Adverse effects: No   PDMP Review       Value Time User    State PDMP site checked  Yes 7/28/2021 12:53 PM Mik Kumari MD        Last CSA Agreement:   CSA -- Patient Level:    Controlled Substance Agreement - Opioid - Scan on 6/28/2021  8:45 AM: Opioid Agreement       Last UDS: 6/29/2021          Review of Systems         Objective    BP  136/74   Pulse 76   Temp 98  F (36.7  C)   Resp 16   Wt 124.7 kg (275 lb)   SpO2 98%   BMI 35.31 kg/m    Body mass index is 35.31 kg/m .  Physical Exam  Constitutional:       Appearance: Normal appearance.   Cardiovascular:      Rate and Rhythm: Normal rate and regular rhythm.      Heart sounds: No murmur heard.    No friction rub.   Pulmonary:      Effort: Pulmonary effort is normal. No respiratory distress.      Breath sounds: No stridor.   Abdominal:      Palpations: Abdomen is soft.      Tenderness: There is no abdominal tenderness. There is no guarding or rebound.      Hernia: A hernia is present.      Comments: Large tender hernia left anterior abdomen, no significant change.     Musculoskeletal:      Comments: No midline back pain on palpation.  Negative straight leg raise and normal lower extremity strength.     Left anterior leg with non tender large purple raised vein.  A few small nodules within this, not painful.   Skin:     General: Skin is warm.   Neurological:      General: No focal deficit present.      Mental Status: He is alert and oriented to person, place, and time.   Psychiatric:         Mood and Affect: Mood normal.         Behavior: Behavior normal.         Thought Content: Thought content normal.        Sensory exam of the foot is normal, tested with the monofilament. Good pulses, no lesions or ulcers, good peripheral pulses.          Results for orders placed or performed in visit on 05/26/22 (from the past 24 hour(s))   CBC W PLT No Diff   Result Value Ref Range    WBC Count 8.9 4.0 - 11.0 10e3/uL    RBC Count 4.93 4.40 - 5.90 10e6/uL    Hemoglobin 15.6 13.3 - 17.7 g/dL    Hematocrit 45.9 40.0 - 53.0 %    MCV 93 78 - 100 fL    MCH 31.6 26.5 - 33.0 pg    MCHC 34.0 31.5 - 36.5 g/dL    RDW 13.2 10.0 - 15.0 %    Platelet Count 328 150 - 450 10e3/uL   ALT (SGPT)   Result Value Ref Range    ALT 25 7 - 52 U/L   Hemoglobin A1c   Result Value Ref Range    Hemoglobin A1C 12.3 (H) 0.0 - 5.6 %    Basic Metabolic Panel   Result Value Ref Range    Sodium 132 (L) 134 - 144 mmol/L    Potassium 4.5 3.5 - 5.1 mmol/L    Chloride 96 (L) 98 - 107 mmol/L    Carbon Dioxide (CO2) 28 21 - 31 mmol/L    Anion Gap 8 3 - 14 mmol/L    Urea Nitrogen 13 7 - 25 mg/dL    Creatinine 0.91 0.70 - 1.30 mg/dL    Calcium 9.9 8.6 - 10.3 mg/dL    Glucose 286 (H) 70 - 105 mg/dL    GFR Estimate >90 >60 mL/min/1.73m2   Prostate Specific Antigen   Result Value Ref Range    PSA Tumor Marker 1.02 0.00 - 4.00 ug/L    Narrative    The DXI Access PSAS WHO assay is a two site immunoenzymatic   assay. Assay values obtained with different assay methods cannot be used   interchangeably due to differences in assay methods and reagent specificity.   UA reflex to Microscopic and Culture    Specimen: Urine, Midstream   Result Value Ref Range    Color Urine Yellow Colorless, Straw, Light Yellow, Yellow    Appearance Urine Clear Clear    Glucose Urine 1000  (A) Negative mg/dL    Bilirubin Urine Negative Negative    Ketones Urine Negative Negative mg/dL    Specific Gravity Urine 1.031 (H) 1.000 - 1.030    Blood Urine Negative Negative    pH Urine 6.0 5.0 - 9.0    Protein Albumin Urine 600  (A) Negative mg/dL    Urobilinogen Urine 2.0 Normal, 2.0 mg/dL    Nitrite Urine Negative Negative    Leukocyte Esterase Urine Small (A) Negative    Bacteria Urine Few (A) None Seen /HPF    Mucus Urine Present (A) None Seen /LPF    RBC Urine 4 (H) <=2 /HPF    WBC Urine 55 (H) <=5 /HPF    Hyaline Casts Urine 4 (H) <=2 /LPF    Granular Casts Urine 1 (H) None Seen /LPF    Narrative    Urine Culture ordered based on laboratory criteria

## 2022-05-26 NOTE — NURSING NOTE
"Chief Complaint   Patient presents with     History of Present Illness     Prostrate and DM check        Initial /74   Pulse 76   Temp 98  F (36.7  C)   Resp 16   Wt 124.7 kg (275 lb)   SpO2 98%   BMI 35.31 kg/m   Estimated body mass index is 35.31 kg/m  as calculated from the following:    Height as of 6/25/21: 1.88 m (6' 2\").    Weight as of this encounter: 124.7 kg (275 lb).  Medication Reconciliation: complete.  FOOD SECURITY SCREENING QUESTIONS  Hunger Vital Signs:  Within the past 12 months we worried whether our food would run out before we got money to buy more. Never  Within the past 12 months the food we bought just didn't last and we didn't have money to get more. Never  Jailyn Baker LPN 5/26/2022 8:32 AM      Jailyn Baker LPN    "

## 2022-05-28 LAB — BACTERIA UR CULT: NORMAL

## 2022-06-07 ENCOUNTER — MYC REFILL (OUTPATIENT)
Dept: FAMILY MEDICINE | Facility: OTHER | Age: 52
End: 2022-06-07
Payer: COMMERCIAL

## 2022-06-07 ENCOUNTER — TELEPHONE (OUTPATIENT)
Dept: FAMILY MEDICINE | Facility: OTHER | Age: 52
End: 2022-06-07
Payer: COMMERCIAL

## 2022-06-07 DIAGNOSIS — C18.1 MALIGNANT NEOPLASM OF APPENDIX VERMIFORMIS (H): ICD-10-CM

## 2022-06-07 DIAGNOSIS — E11.9 DIABETES MELLITUS WITHOUT COMPLICATION (H): Primary | ICD-10-CM

## 2022-06-07 NOTE — TELEPHONE ENCOUNTER
Jordy Kumari. The medication Basaglar KwikPen 100UNIT/ML Pen-Injectors ahs been rejected by patient's insurance. These are suggestions from them that do not need prior authorization:  Insulin Glargine-yfgn, Lantus, lantus SoloStar, Levemir, Levemir FlexTouch and NovoLOG FlexPen. Please advise. Thank you.  Nancy Venegas on 6/7/2022 at 4:03 PM

## 2022-06-08 NOTE — TELEPHONE ENCOUNTER
Routing to pcp for review when return back in office on 6/13/22  Kenia Nuñez RN on 6/8/2022 at 11:12 AM    Last Prescription Date: 7/28/21  Last Qty/Refills: 90 / 0  Last Office Visit: 5/26/22  Future Office Visit: None     Requested Prescriptions   Pending Prescriptions Disp Refills     oxyCODONE-acetaminophen (PERCOCET) 5-325 MG tablet 90 tablet 0     Sig: Take 1 tablet by mouth every 4 hours as needed for pain       There is no refill protocol information for this order

## 2022-06-13 RX ORDER — OXYCODONE AND ACETAMINOPHEN 5; 325 MG/1; MG/1
1 TABLET ORAL EVERY 4 HOURS PRN
Qty: 90 TABLET | Refills: 0 | Status: SHIPPED | OUTPATIENT
Start: 2022-06-13 | End: 2022-07-14

## 2022-06-14 ENCOUNTER — MYC MEDICAL ADVICE (OUTPATIENT)
Dept: FAMILY MEDICINE | Facility: OTHER | Age: 52
End: 2022-06-14
Payer: COMMERCIAL

## 2022-06-14 DIAGNOSIS — N52.9 ERECTILE DYSFUNCTION, UNSPECIFIED ERECTILE DYSFUNCTION TYPE: Primary | ICD-10-CM

## 2022-06-21 ENCOUNTER — TELEPHONE (OUTPATIENT)
Dept: EDUCATION SERVICES | Facility: OTHER | Age: 52
End: 2022-06-21
Payer: COMMERCIAL

## 2022-06-21 DIAGNOSIS — E11.9 DIABETES MELLITUS WITHOUT COMPLICATION (H): Primary | ICD-10-CM

## 2022-06-21 RX ORDER — PROCHLORPERAZINE 25 MG/1
SUPPOSITORY RECTAL
Qty: 1 EACH | Refills: 0 | Status: SHIPPED | OUTPATIENT
Start: 2022-06-21 | End: 2023-03-30

## 2022-06-21 RX ORDER — PROCHLORPERAZINE 25 MG/1
SUPPOSITORY RECTAL
Qty: 1 EACH | Refills: 3 | Status: SHIPPED | OUTPATIENT
Start: 2022-06-21 | End: 2023-03-30

## 2022-06-21 RX ORDER — PROCHLORPERAZINE 25 MG/1
SUPPOSITORY RECTAL
Qty: 9 EACH | Refills: 3 | Status: SHIPPED | OUTPATIENT
Start: 2022-06-21 | End: 2023-03-30

## 2022-06-21 NOTE — TELEPHONE ENCOUNTER
[facility] Dexcom G6 CGM.    Patient has [place] insurance and currently meets 1 of 1 criteria for coverage:    1.  Patient has diabetes:  YES       If accepted as written, please sign pending orders.     Thank you,     Maddy Vargas RN, BSN, [facility]  6/21/2022 10:12 AM

## 2022-06-23 ENCOUNTER — MYC MEDICAL ADVICE (OUTPATIENT)
Dept: FAMILY MEDICINE | Facility: OTHER | Age: 52
End: 2022-06-23

## 2022-06-27 ENCOUNTER — LAB (OUTPATIENT)
Dept: LAB | Facility: OTHER | Age: 52
End: 2022-06-27
Attending: FAMILY MEDICINE
Payer: COMMERCIAL

## 2022-06-27 DIAGNOSIS — N52.9 ERECTILE DYSFUNCTION, UNSPECIFIED ERECTILE DYSFUNCTION TYPE: ICD-10-CM

## 2022-06-27 PROCEDURE — 84403 ASSAY OF TOTAL TESTOSTERONE: CPT | Mod: ZL

## 2022-06-27 PROCEDURE — 36415 COLL VENOUS BLD VENIPUNCTURE: CPT | Mod: ZL

## 2022-06-30 LAB — TESTOST SERPL-MCNC: 398 NG/DL (ref 240–950)

## 2022-07-11 ENCOUNTER — MYC MEDICAL ADVICE (OUTPATIENT)
Dept: FAMILY MEDICINE | Facility: OTHER | Age: 52
End: 2022-07-11

## 2022-07-14 ENCOUNTER — OFFICE VISIT (OUTPATIENT)
Dept: FAMILY MEDICINE | Facility: OTHER | Age: 52
End: 2022-07-14
Attending: FAMILY MEDICINE
Payer: COMMERCIAL

## 2022-07-14 VITALS
OXYGEN SATURATION: 98 % | DIASTOLIC BLOOD PRESSURE: 78 MMHG | BODY MASS INDEX: 34.76 KG/M2 | HEART RATE: 80 BPM | WEIGHT: 279.6 LBS | RESPIRATION RATE: 16 BRPM | HEIGHT: 75 IN | SYSTOLIC BLOOD PRESSURE: 132 MMHG | TEMPERATURE: 98 F

## 2022-07-14 DIAGNOSIS — E11.9 DIABETES MELLITUS WITHOUT COMPLICATION (H): ICD-10-CM

## 2022-07-14 DIAGNOSIS — R53.83 FATIGUE, UNSPECIFIED TYPE: ICD-10-CM

## 2022-07-14 DIAGNOSIS — C18.1 MALIGNANT NEOPLASM OF APPENDIX VERMIFORMIS (H): Primary | ICD-10-CM

## 2022-07-14 LAB
ALBUMIN SERPL-MCNC: 4.3 G/DL (ref 3.5–5.7)
ALP SERPL-CCNC: 84 U/L (ref 34–104)
ALT SERPL W P-5'-P-CCNC: 21 U/L (ref 7–52)
ANION GAP SERPL CALCULATED.3IONS-SCNC: 9 MMOL/L (ref 3–14)
AST SERPL W P-5'-P-CCNC: 20 U/L (ref 13–39)
BILIRUB SERPL-MCNC: 0.7 MG/DL (ref 0.3–1)
BUN SERPL-MCNC: 19 MG/DL (ref 7–25)
CALCIUM SERPL-MCNC: 10 MG/DL (ref 8.6–10.3)
CHLORIDE BLD-SCNC: 97 MMOL/L (ref 98–107)
CHOLEST SERPL-MCNC: 241 MG/DL
CK SERPL-CCNC: 44 U/L (ref 30–223)
CO2 SERPL-SCNC: 28 MMOL/L (ref 21–31)
CREAT SERPL-MCNC: 1.02 MG/DL (ref 0.7–1.3)
CRP SERPL-MCNC: 9.9 MG/L
ERYTHROCYTE [DISTWIDTH] IN BLOOD BY AUTOMATED COUNT: 13.3 % (ref 10–15)
FASTING STATUS PATIENT QL REPORTED: ABNORMAL
GFR SERPL CREATININE-BSD FRML MDRD: 89 ML/MIN/1.73M2
GLUCOSE BLD-MCNC: 165 MG/DL (ref 70–105)
HCT VFR BLD AUTO: 43.5 % (ref 40–53)
HDLC SERPL-MCNC: 36 MG/DL (ref 23–92)
HGB BLD-MCNC: 15.4 G/DL (ref 13.3–17.7)
LDLC SERPL CALC-MCNC: 158 MG/DL
MCH RBC QN AUTO: 32.2 PG (ref 26.5–33)
MCHC RBC AUTO-ENTMCNC: 35.4 G/DL (ref 31.5–36.5)
MCV RBC AUTO: 91 FL (ref 78–100)
NONHDLC SERPL-MCNC: 205 MG/DL
PLATELET # BLD AUTO: 281 10E3/UL (ref 150–450)
POTASSIUM BLD-SCNC: 4.3 MMOL/L (ref 3.5–5.1)
PROT SERPL-MCNC: 8.1 G/DL (ref 6.4–8.9)
RBC # BLD AUTO: 4.79 10E6/UL (ref 4.4–5.9)
SODIUM SERPL-SCNC: 134 MMOL/L (ref 134–144)
TRIGL SERPL-MCNC: 234 MG/DL
TSH SERPL DL<=0.005 MIU/L-ACNC: 0.87 MU/L (ref 0.4–4)
WBC # BLD AUTO: 9.4 10E3/UL (ref 4–11)

## 2022-07-14 PROCEDURE — 80061 LIPID PANEL: CPT | Mod: ZL | Performed by: FAMILY MEDICINE

## 2022-07-14 PROCEDURE — 85027 COMPLETE CBC AUTOMATED: CPT | Mod: ZL | Performed by: FAMILY MEDICINE

## 2022-07-14 PROCEDURE — 82378 CARCINOEMBRYONIC ANTIGEN: CPT | Mod: ZL | Performed by: FAMILY MEDICINE

## 2022-07-14 PROCEDURE — 99215 OFFICE O/P EST HI 40 MIN: CPT | Performed by: FAMILY MEDICINE

## 2022-07-14 PROCEDURE — 80053 COMPREHEN METABOLIC PANEL: CPT | Mod: ZL | Performed by: FAMILY MEDICINE

## 2022-07-14 PROCEDURE — 82043 UR ALBUMIN QUANTITATIVE: CPT | Mod: ZL | Performed by: FAMILY MEDICINE

## 2022-07-14 PROCEDURE — G0463 HOSPITAL OUTPT CLINIC VISIT: HCPCS

## 2022-07-14 PROCEDURE — 84443 ASSAY THYROID STIM HORMONE: CPT | Mod: ZL | Performed by: FAMILY MEDICINE

## 2022-07-14 PROCEDURE — 86140 C-REACTIVE PROTEIN: CPT | Mod: ZL | Performed by: FAMILY MEDICINE

## 2022-07-14 PROCEDURE — 36415 COLL VENOUS BLD VENIPUNCTURE: CPT | Mod: ZL | Performed by: FAMILY MEDICINE

## 2022-07-14 PROCEDURE — 82550 ASSAY OF CK (CPK): CPT | Mod: ZL | Performed by: FAMILY MEDICINE

## 2022-07-14 RX ORDER — OXYCODONE AND ACETAMINOPHEN 5; 325 MG/1; MG/1
1 TABLET ORAL EVERY 4 HOURS PRN
Qty: 90 TABLET | Refills: 0 | Status: SHIPPED | OUTPATIENT
Start: 2022-07-14 | End: 2022-10-26

## 2022-07-14 RX ORDER — OXYCODONE AND ACETAMINOPHEN 5; 325 MG/1; MG/1
1 TABLET ORAL EVERY 6 HOURS PRN
Qty: 90 TABLET | Refills: 0 | Status: SHIPPED | OUTPATIENT
Start: 2022-08-11 | End: 2022-09-08

## 2022-07-14 RX ORDER — OXYCODONE AND ACETAMINOPHEN 5; 325 MG/1; MG/1
1 TABLET ORAL EVERY 6 HOURS PRN
Qty: 90 TABLET | Refills: 0 | Status: SHIPPED | OUTPATIENT
Start: 2022-09-15 | End: 2022-09-08

## 2022-07-14 RX ORDER — INSULIN GLARGINE 100 [IU]/ML
INJECTION, SOLUTION SUBCUTANEOUS
COMMUNITY
Start: 2022-06-22 | End: 2022-08-16

## 2022-07-14 ASSESSMENT — ANXIETY QUESTIONNAIRES
7. FEELING AFRAID AS IF SOMETHING AWFUL MIGHT HAPPEN: SEVERAL DAYS
6. BECOMING EASILY ANNOYED OR IRRITABLE: MORE THAN HALF THE DAYS
1. FEELING NERVOUS, ANXIOUS, OR ON EDGE: SEVERAL DAYS
GAD7 TOTAL SCORE: 9
GAD7 TOTAL SCORE: 9
7. FEELING AFRAID AS IF SOMETHING AWFUL MIGHT HAPPEN: SEVERAL DAYS
GAD7 TOTAL SCORE: 9
4. TROUBLE RELAXING: MORE THAN HALF THE DAYS
2. NOT BEING ABLE TO STOP OR CONTROL WORRYING: SEVERAL DAYS
3. WORRYING TOO MUCH ABOUT DIFFERENT THINGS: SEVERAL DAYS
5. BEING SO RESTLESS THAT IT IS HARD TO SIT STILL: SEVERAL DAYS
8. IF YOU CHECKED OFF ANY PROBLEMS, HOW DIFFICULT HAVE THESE MADE IT FOR YOU TO DO YOUR WORK, TAKE CARE OF THINGS AT HOME, OR GET ALONG WITH OTHER PEOPLE?: SOMEWHAT DIFFICULT

## 2022-07-14 ASSESSMENT — PATIENT HEALTH QUESTIONNAIRE - PHQ9
10. IF YOU CHECKED OFF ANY PROBLEMS, HOW DIFFICULT HAVE THESE PROBLEMS MADE IT FOR YOU TO DO YOUR WORK, TAKE CARE OF THINGS AT HOME, OR GET ALONG WITH OTHER PEOPLE: VERY DIFFICULT
SUM OF ALL RESPONSES TO PHQ QUESTIONS 1-9: 17
SUM OF ALL RESPONSES TO PHQ QUESTIONS 1-9: 17

## 2022-07-14 ASSESSMENT — PAIN SCALES - GENERAL: PAINLEVEL: EXTREME PAIN (8)

## 2022-07-14 NOTE — LETTER
Opioid / Opioid Plus Controlled Substance Agreement    This is an agreement between you and your provider about the safe and appropriate use of controlled substance/opioids prescribed by your care team. Controlled substances are medicines that can cause physical and mental dependence (abuse).    There are strict laws about having and using these medicines. We here at [facility] are committing to working with you in your efforts to get better. To support you in this work, we ll help you schedule regular office appointments for medicine refills. If we must cancel or change your appointment for any reason, we ll make sure you have enough medicine to last until your next appointment.     As a Provider, I will:    Listen carefully to your concerns and treat you with respect.     Recommend a treatment plan that I believe is in your best interest. This plan may involve therapies other than opioid pain medication.     Talk with you often about the possible benefits, and the risk of harm of any medicine that we prescribe for you.     Provide a plan on how to taper (discontinue or go off) using this medicine if the decision is made to stop its use.    As a Patient, I understand that opioid(s):     Are a controlled substance prescribed by my care team to help me function or work and manage my condition(s).     Are strong medicines and can cause serious side effects such as:    Drowsiness, which can seriously affect my driving ability    A lower breathing rate, enough to cause death    Harm to my thinking ability     Depression     Abuse of and addiction to this medicine    Need to be taken exactly as prescribed. Combining opioids with certain medicines or chemicals (such as illegal drugs, sedatives, sleeping pills, and benzodiazepines) can be dangerous or even fatal. If I stop opioids suddenly, I may have severe withdrawal symptoms.    Do not work for all types of pain nor for all patients. If they re not helpful, I may  be asked to stop them.        The risks, benefits and side effects of these medicine(s) were explained to me. I agree that:  1. I will take part in other treatments as advised by my care team. This may be psychiatry or counseling, physical therapy, behavioral therapy, group treatment or a referral to a specialist.     2. I will keep all my appointments. I understand that this is part of the monitoring of opioids. My care team may require an office visit for EVERY opioid/controlled substance refill. If I miss appointments or don t follow instructions, my care team may stop my medicine.    3. I will take my medicines as prescribed. I will not change the dose or schedule unless my care team tells me to. There will be no refills if I run out early.     4. I may be asked to come to the clinic and complete a urine drug test or complete a pill count at any time. If I don t give a urine sample or participate in a pill count, the care team may stop my medicine.    5. I will only receive prescriptions from this clinic for chronic pain. If I am treated by another provider for acute pain issues, I will tell them that I am taking opioid pain medication for chronic pain and that I have a treatment agreement with this provider. I will inform my [facility] care team within one business day if I am given a prescription for any pain medication by another healthcare provider. My [facility] care team can contact other providers and pharmacists about my use of any medicines.    6. It is up to me to make sure that I don t run out of my medicines on weekends or holidays. If my care team is willing to refill my opioid prescription without a visit, I must request refills only during office hours. Refills may take up to 3 business days to process. I will use one pharmacy to fill all my opioid and other controlled substance prescriptions. I will notify the clinic about any changes to my insurance or medication  availability.    7. I am responsible for my prescriptions. If the medicine/prescription is lost, stolen or destroyed, it will not be replaced. I also agree not to share controlled substance medicines with anyone.    8. I am aware I should not use any illegal or recreational drugs. I agree not to drink alcohol unless my care team says I can.       9. If I enroll in the [place] [facility] Cannabis program, I will tell my care team prior to my next refill.     10. I will tell my care team right away if I become pregnant, have a new medical problem treated outside of my regular clinic, or have a change in my medications.    11. I understand that this medicine can affect my thinking, judgment and reaction time. Alcohol and drugs affect the brain and body, which can affect the safety of my driving. Being under the influence of alcohol or drugs can affect my decision-making, behaviors, personal safety, and the safety of others. Driving while impaired (DWI) can occur if a person is driving, operating, or in physical control of a car, motorcycle, boat, snowmobile, ATV, motorbike, off-road vehicle, or any other motor vehicle ([place] Statute 169A.20). I understand the risk if I choose to drive or operate any vehicle or machinery.    I understand that if I do not follow any of the conditions above, my prescriptions or treatment may be stopped or changed.          Opioids  What You Need to Know    What are opioids?   Opioids are pain medicines that must be prescribed by a doctor. They are also known as narcotics.     Examples are:   1. morphine (MS Contin, Yuli)  2. oxycodone (Oxycontin)  3. oxycodone and acetaminophen (Percocet)  4. hydrocodone and acetaminophen (Vicodin, Norco)   5. fentanyl patch (Duragesic)   6. hydromorphone (Dilaudid)   7. methadone  8. codeine (Tylenol #3)     What do opioids do well?   Opioids are best for severe short-term pain such as after a surgery or injury. They may work well for cancer pain. They may  help some people with long-lasting (chronic) pain.     What do opioids NOT do well?   Opioids never get rid of pain entirely, and they don t work well for most patients with chronic pain. Opioids don t reduce swelling, one of the causes of pain.                                    Other ways to manage chronic pain and improve function include:       Treat the health problem that may be causing pain    Anti-inflammation medicines, which reduce swelling and tenderness, such as ibuprofen (Advil, Motrin) or naproxen (Aleve)    Acetaminophen (Tylenol)    Antidepressants and anti-seizure medicines, especially for nerve pain    Topical treatments such as patches or creams    Injections or nerve blocks    Chiropractic or osteopathic treatment    Acupuncture, massage, deep breathing, meditation, visual imagery, aromatherapy    Use heat or ice at the pain site    Physical therapy     Exercise    Stop smoking    Take part in therapy       Risks and side effects     Talk to your doctor before you start or decide to keep taking opioids. Possible side effects include:      Lowering your breathing rate enough to cause death    Overdose, including death, especially if taking higher than prescribed doses    Worse depression symptoms; less pleasure in things you usually enjoy    Feeling tired or sluggish    Slower thoughts or cloudy thinking    Being more sensitive to pain over time; pain is harder to control    Trouble sleeping or restless sleep    Changes in hormone levels (for example, less testosterone)    Changes in sex drive or ability to have sex    Constipation    Unsafe driving    Itching and sweating    Dizziness    Nausea, throwing up and dry mouth    What else should I know about opioids?    Opioids may lead to dependence, tolerance, or addiction.      Dependence means that if you stop or reduce the medicine too quickly, you will have withdrawal symptoms. These include loose poop (diarrhea), jitters, flu-like symptoms,  nervousness and tremors. Dependence is not the same as addiction.                       Tolerance means needing higher doses over time to get the same effect. This may increase the chance of serious side effects.      Addiction is when people improperly use a substance that harms their body, their mind or their relations with others. Use of opiates can cause a relapse of addiction if you have a history of drug or alcohol abuse.      People who have used opioids for a long time may have a lower quality of life, worse depression, higher levels of pain and more visits to doctors.    You can overdose on opioids. Take these steps to lower your risk of overdose:    1. Recognize the signs:  Signs of overdose include decrease or loss of consciousness (blackout), slowed breathing, trouble waking up and blue lips. If someone is worried about overdose, they should call 911.    2. Talk to your doctor about Narcan (naloxone).   If you are at risk for overdose, you may be given a prescription for Narcan. This medicine very quickly reverses the effects of opioids.   If you overdose, a friend or family member can give you Narcan while waiting for the ambulance. They need to know the signs of overdose and how to give Narcan.     3. Don't use alcohol or street drugs.   Taking them with opioids can cause death.    4. Do not take any of these medicines unless your doctor says it s OK. Taking these with opioids can cause death:    Benzodiazepines, such as lorazepam (Ativan), alprazolam (Xanax) or diazepam (Valium)    Muscle relaxers, such as cyclobenzaprine (Flexeril)    Sleeping pills like zolpidem (Ambien)     Other opioids      How to keep you and other people safe while taking opioids:    1. Never share your opioids with others.  Opioid medicines are regulated by the Drug Enforcement Agency (PADDY). Selling or sharing medications is a criminal act.    2. Be sure to store opioids in a secure place, locked up if possible. Young children  can easily swallow them and overdose.    3. When you are traveling with your medicines, keep them in the original bottles. If you use a pill box, be sure you also carry a copy of your medicine list from your clinic or pharmacy.    4. Safe disposal of opioids    Most pharmacies have places to get rid of medicine, called disposal kiosks. Medicine disposal options are also available in every [facility]. Search your county and  medication disposal  to find more options. You can find more details at:  [email or web address]     I agree that my provider, clinic care team, and pharmacy may work with any city, state or federal law enforcement agency that investigates the misuse, sale, or other diversion of my controlled medicine. I will allow my provider to discuss my care with, or share a copy of, this agreement with any other treating provider, pharmacy or emergency room where I receive care.    I have read this agreement and have asked questions about anything I did not understand.    _______________________________________________________  Patient Signature - Jaswant Chong _____________________                   Date     _______________________________________________________  Provider Signature - Mik Kumari MD   _____________________                   Date     _______________________________________________________  Witness Signature (required if provider not present while patient signing)   _____________________                   Date

## 2022-07-14 NOTE — PROGRESS NOTES
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  Refilled these meds for the next 90 days. He was supposed to have ongoing repeat CT scans every 1-3 years, is overdue.   Plan: CRP inflammation, CT Abdomen Pelvis w Contrast,        Comprehensive Metabolic Panel, CEA,         oxyCODONE-acetaminophen (PERCOCET) 5-325 MG         tablet, oxyCODONE-acetaminophen (PERCOCET)         5-325 MG tablet, oxyCODONE-acetaminophen         (PERCOCET) 5-325 MG tablet             (E11.9) Diabetes mellitus without complication (H)  Comment: improving  Plan: Lipid Panel, Albumin Random Urine Quantitative         with Creat Ratio             (R53.83) Fatigue, unspecified type  Comment: there are many possible causes, with long haul COVID and depression being most likely.  Given active cancer, and no recent surveillance, will need to repeat the CT.  Of course, bony mets can cause worsening pain too.     Plan: CK Total, TSH, CBC W PLT No Diff             50 minutes total in his care, including reviews of old labs/records.             BMI:   Estimated body mass index is 34.95 kg/m  as calculated from the following:    Height as of this encounter: 1.905 m (6' 3\").    Weight as of this encounter: 126.8 kg (279 lb 9.6 oz).           No follow-ups on file.    Mik Kumari MD  [facility]   Enzo is a 51 year old, presenting for the following health issues:  RECHECK (Pain and low energy)      History of Present Illness       Back Pain:  He presents for follow up of back pain. Patient's back pain is a new problem.    Original cause of back pain: not sure  First noticed back pain: more than 1 month ago  Patient feels back pain: comes and goesLocation of back pain:  Right lower back, left lower back, right shoulder, left shoulder, right hip and left hip  Description of back pain: sharp and shooting  Back pain spreads: " right knee, left knee, right foot and left foot    Since patient first noticed back pain, pain is: gradually worsening  Does back pain interfere with his job:  Yes  On a scale of 1-10 (10 being the worst), patient describes pain as:  8  What makes back pain worse: coughing, lying down, standing and twisting  Acupuncture: not tried  Acetaminophen: helpful  Activity or exercise: not helpful  Chiropractor:  Not tried  Cold: not tried  Heat: not tried  Massage: not tried  Muscle relaxants: not tried  NSAIDS: helpful  Opioids: helpful  Physical Therapy: not tried  Rest: not helpful  Steroid Injection: not tried  Stretching: not helpful  Surgery: not tried  TENS unit: not tried  Topical pain relievers: not helpful  Other healthcare providers patient is seeing for back pain: None    Mental Health Follow-up:  Patient presents to follow-up on Depression & Anxiety.Patient's depression since last visit has been:  No change  The patient is not having other symptoms associated with depression.  Patient's anxiety since last visit has been:  No change  The patient is not having other symptoms associated with anxiety.  Any significant life events: No  Patient is not feeling anxious or having panic attacks.  Patient has no concerns about alcohol or drug use.    Diabetes:   He presents for follow up of diabetes.  He is not checking blood glucose. He has no concerns regarding his diabetes at this time.  He is having numbness in feet and burning in feet. The patient has not had a diabetic eye exam in the last 12 months.         Reason for visit:  No energy    He eats 2-3 servings of fruits and vegetables daily.He consumes 1 sweetened beverage(s) daily.He exercises with enough effort to increase his heart rate 20 to 29 minutes per day.  He exercises with enough effort to increase his heart rate 5 days per week. He is missing 1 dose(s) of medications per week.    Today's PHQ-9         PHQ-9 Total Score: 17    PHQ-9 Q9 Thoughts of better  off dead/self-harm past 2 weeks :   Not at all    How difficult have these problems made it for you to do your work, take care of things at home, or get along with other people: Very difficult  Today's MESFIN-7 Score: 9     Continues to have significant fatigue.  With rolling in bed last night had 9/10 right rib pain.  Significant low back pain, getting worse too.  Cannot stand now for more than 15 minutes or so. hard time staying awake beyond 8:00 PM now.  Prior to this flare, he would use 1 percocet every other day or so. Now taking it about three times a day or so.  Getting chest pain at times on both sides. Sharp, stabbing type.  Some changes to his abdomen lately too, mass like sensation in the RUQ and worsening bulging to his abdomen hernia.  Losing weight, poor appetite.      Last CT follow up on his appendix cancer was 2019, showed an enlargement in the soft tissue nodularity at the rectosigmoid anastomosis and a right pleural effussion.      Had a heart cath in 2018, was normal.  Had COVID in April, and all of the symptoms seem to be since then.  Was also ill in April with presumed prostatitis.  Was seen in the [facility] then. No imaging then.      Had not been taking most of his meds, but in the last 2-3 months he has started addressing the diabetes a bit.  Still not taking the metformin or statin.  getting headaches from them.      Pain History:  When did you first notice your pain? - Chronic Pain   Have you seen this provider for your pain in the past?   Yes   Where in your body do you have pain? Back and knees and abdomen   Are you seeing anyone else for your pain? No    PHQ-9 SCORE 9/17/2015 6/25/2021 7/14/2022   PHQ-9 Total Score MyChart - - 17 (Moderately severe depression)   PHQ-9 Total Score 5 0 17       MESFIN-7 SCORE 6/25/2021 5/26/2022 7/14/2022   Total Score - - 9 (mild anxiety)   Total Score 0 0 9               Chronic Pain Follow Up:    Location of pain: abdomen,  knee  Analgesia/pain control:    - Recent changes:  no    - Overall control: Tolerable with discomfort    - Current treatments: opiaites, weight loss   Adherence:     - Do you ever take more pain medicine than prescribed? No    - When did you take your last dose of pain medicine?  yesterday   Adverse effects: No   PDMP Review       Value Time User    State PDMP site checked  Yes 6/13/2022  7:26 AM Mik Kumari MD        Last CSA Agreement:   CSA -- Patient Level:    Controlled Substance Agreement - Opioid - Scan on 6/28/2021  8:45 AM: Opioid Agreement       Last UDS: 6/29/2021            Current Outpatient Medications   Medication     aspirin (ASA) 81 MG chewable tablet     Blood Pressure KIT     carvedilol (COREG) 25 MG tablet     furosemide (LASIX) 40 MG tablet     insulin aspart (NOVOLOG PEN) 100 UNIT/ML pen     insulin pen needle (BD VELIA U/F) 32G X 4 MM miscellaneous     LANTUS SOLOSTAR 100 UNIT/ML soln     lisinopril (ZESTRIL) 40 MG tablet     oxyCODONE-acetaminophen (PERCOCET) 5-325 MG tablet     [START ON 8/11/2022] oxyCODONE-acetaminophen (PERCOCET) 5-325 MG tablet     [START ON 9/15/2022] oxyCODONE-acetaminophen (PERCOCET) 5-325 MG tablet     traZODone (DESYREL) 50 MG tablet     blood glucose (COOL BLOOD GLUCOSE TEST STRIPS) test strip     blood glucose monitoring (CONTOUR NEXT TEST) test strip     Blood Glucose Monitoring Suppl (FIFTY50 GLUCOSE METER 2.0) w/Device KIT     Continuous Blood Gluc  (DEXCOM G6 ) HEATHER     Continuous Blood Gluc Sensor (DEXCOM G6 SENSOR) MISC     Continuous Blood Gluc Transmit (DEXCOM G6 TRANSMITTER) MISC     lancets 28G MISC     metFORMIN (GLUCOPHAGE) 1000 MG tablet     rosuvastatin (CRESTOR) 20 MG tablet     terazosin (HYTRIN) 2 MG capsule     traMADol (ULTRAM) 50 MG tablet     No current facility-administered medications for this visit.       Review of Systems         Objective    /78   Pulse 80   Temp 98  F (36.7  C) (Tympanic)   Resp 16   Ht 1.905  "m (6' 3\")   Wt 126.8 kg (279 lb 9.6 oz)   SpO2 98%   BMI 34.95 kg/m    Body mass index is 34.95 kg/m .  Physical Exam  Constitutional:       Appearance: Normal appearance.   HENT:      Head: Normocephalic and atraumatic.   Cardiovascular:      Rate and Rhythm: Normal rate and regular rhythm.   Pulmonary:      Effort: Pulmonary effort is normal. No respiratory distress.      Breath sounds: No stridor.   Abdominal:      General: There is distension.      Tenderness: There is abdominal tenderness.      Hernia: A hernia is present.      Comments: Very large ventral hernia, in llq primarily.    Neurological:      General: No focal deficit present.      Mental Status: He is alert and oriented to person, place, and time.   Psychiatric:         Mood and Affect: Mood normal.         Behavior: Behavior normal.         Thought Content: Thought content normal.        Sensory exam of the foot is normal, tested with the monofilament. Good pulses, no lesions or ulcers, good peripheral pulses.        Results for orders placed or performed in visit on 07/14/22   CBC W PLT No Diff     Status: Normal   Result Value Ref Range    WBC Count 9.4 4.0 - 11.0 10e3/uL    RBC Count 4.79 4.40 - 5.90 10e6/uL    Hemoglobin 15.4 13.3 - 17.7 g/dL    Hematocrit 43.5 40.0 - 53.0 %    MCV 91 78 - 100 fL    MCH 32.2 26.5 - 33.0 pg    MCHC 35.4 31.5 - 36.5 g/dL    RDW 13.3 10.0 - 15.0 %    Platelet Count 281 150 - 450 10e3/uL   TSH     Status: Normal   Result Value Ref Range    TSH 0.87 0.40 - 4.00 mU/L   CK Total     Status: Normal   Result Value Ref Range    CK 44 30 - 223 U/L   Comprehensive Metabolic Panel     Status: Abnormal   Result Value Ref Range    Sodium 134 134 - 144 mmol/L    Potassium 4.3 3.5 - 5.1 mmol/L    Chloride 97 (L) 98 - 107 mmol/L    Carbon Dioxide (CO2) 28 21 - 31 mmol/L    Anion Gap 9 3 - 14 mmol/L    Urea Nitrogen 19 7 - 25 mg/dL    Creatinine 1.02 0.70 - 1.30 mg/dL    Calcium 10.0 8.6 - 10.3 mg/dL    Glucose 165 (H) 70 - " 105 mg/dL    Alkaline Phosphatase 84 34 - 104 U/L    AST 20 13 - 39 U/L    ALT 21 7 - 52 U/L    Protein Total 8.1 6.4 - 8.9 g/dL    Albumin 4.3 3.5 - 5.7 g/dL    Bilirubin Total 0.7 0.3 - 1.0 mg/dL    GFR Estimate 89 >60 mL/min/1.73m2   CRP inflammation     Status: Normal   Result Value Ref Range    CRP Inflammation 9.9 <10.0 mg/L   Lipid Panel     Status: Abnormal   Result Value Ref Range    Cholesterol 241 (H) <200 mg/dL    Triglycerides 234 (H) <150 mg/dL    Direct Measure HDL 36 23 - 92 mg/dL    LDL Cholesterol Calculated 158 (H) <=100 mg/dL    Non HDL Cholesterol 205 (H) <130 mg/dL    Patient Fasting > 8hrs? Unknown     Narrative    Cholesterol  Desirable:  <200 mg/dL    Triglycerides  Normal:  Less than 150 mg/dL  Borderline High:  150-199 mg/dL  High:  200-499 mg/dL  Very High:  Greater than or equal to 500 mg/dL    Direct Measure HDL  Female:  Greater than or equal to 50 mg/dL   Male:  Greater than or equal to 40 mg/dL    LDL Cholesterol  Desirable:  <100mg/dL  Above Desirable:  100-129 mg/dL   Borderline High:  130-159 mg/dL   High:  160-189 mg/dL   Very High:  >= 190 mg/dL    Non HDL Cholesterol  Desirable:  130 mg/dL  Above Desirable:  130-159 mg/dL  Borderline High:  160-189 mg/dL  High:  190-219 mg/dL  Very High:  Greater than or equal to 220 mg/dL                     .  ..

## 2022-07-14 NOTE — NURSING NOTE
"Chief Complaint   Patient presents with     RECHECK     Pain and low energy       Initial /78   Pulse 80   Temp 98  F (36.7  C) (Tympanic)   Resp 16   Ht 1.905 m (6' 3\")   Wt 126.8 kg (279 lb 9.6 oz)   SpO2 98%   BMI 34.95 kg/m   Estimated body mass index is 34.95 kg/m  as calculated from the following:    Height as of this encounter: 1.905 m (6' 3\").    Weight as of this encounter: 126.8 kg (279 lb 9.6 oz).  Medication Reconciliation: complete    FOOD SECURITY SCREENING QUESTIONS  Hunger Vital Signs:  Within the past 12 months we worried whether our food would run out before we got money to buy more. Never  Within the past 12 months the food we bought just didn't last and we didn't have money to get more. Never  Laurence Jose LPN 7/14/2022 2:43 PM    Do you have a healthcare directive? Yes        "

## 2022-07-15 ENCOUNTER — TELEPHONE (OUTPATIENT)
Dept: FAMILY MEDICINE | Facility: OTHER | Age: 52
End: 2022-07-15

## 2022-07-15 LAB
CEA SERPL-MCNC: 6.8 NG/ML
CREAT UR-MCNC: 353 MG/DL
MICROALBUMIN UR-MCNC: 3834 MG/L
MICROALBUMIN/CREAT UR: 1086.12 MG/G CR (ref 0–17)

## 2022-07-15 NOTE — TELEPHONE ENCOUNTER
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 When you have a chance will you please complete and close your visit from 07/14/2022. Insurance is requesting clinicals for your request for CT scan. Thank you.  Nancy Venegas on 7/15/2022 at 10:02 AM

## 2022-07-26 ENCOUNTER — MYC MEDICAL ADVICE (OUTPATIENT)
Dept: FAMILY MEDICINE | Facility: OTHER | Age: 52
End: 2022-07-26

## 2022-07-26 DIAGNOSIS — E78.2 MIXED HYPERLIPIDEMIA: ICD-10-CM

## 2022-07-26 DIAGNOSIS — I10 HTN (HYPERTENSION), MALIGNANT: ICD-10-CM

## 2022-07-27 DIAGNOSIS — I10 HTN (HYPERTENSION), MALIGNANT: ICD-10-CM

## 2022-07-27 RX ORDER — TERAZOSIN 2 MG/1
CAPSULE ORAL
Qty: 90 CAPSULE | Refills: 3 | Status: SHIPPED | OUTPATIENT
Start: 2022-07-27 | End: 2023-10-30

## 2022-07-27 RX ORDER — LISINOPRIL 40 MG/1
40 TABLET ORAL DAILY
Qty: 90 TABLET | Refills: 3 | Status: SHIPPED | OUTPATIENT
Start: 2022-07-27 | End: 2022-10-26

## 2022-07-27 RX ORDER — ROSUVASTATIN CALCIUM 20 MG/1
20 TABLET, COATED ORAL DAILY
Qty: 90 TABLET | Refills: 3 | Status: SHIPPED | OUTPATIENT
Start: 2022-07-27 | End: 2023-09-29

## 2022-07-27 NOTE — TELEPHONE ENCOUNTER
Routing refill request to provider for review/approval because:    LOV: 7/14/22    Samantha Olmos RN on 7/27/2022 at 10:40 AM

## 2022-07-29 ENCOUNTER — HOSPITAL ENCOUNTER (OUTPATIENT)
Dept: CT IMAGING | Facility: OTHER | Age: 52
Discharge: HOME | End: 2022-07-29
Attending: FAMILY MEDICINE | Admitting: FAMILY MEDICINE
Payer: COMMERCIAL

## 2022-07-29 DIAGNOSIS — C18.1 MALIGNANT NEOPLASM OF APPENDIX VERMIFORMIS (H): ICD-10-CM

## 2022-07-29 PROCEDURE — 74177 CT ABD & PELVIS W/CONTRAST: CPT

## 2022-07-29 PROCEDURE — 250N000011 HC RX IP 250 OP 636: Performed by: FAMILY MEDICINE

## 2022-07-29 RX ORDER — IOPAMIDOL 755 MG/ML
150 INJECTION, SOLUTION INTRAVASCULAR ONCE
Status: COMPLETED | OUTPATIENT
Start: 2022-07-29 | End: 2022-07-29

## 2022-07-29 RX ADMIN — IOPAMIDOL 150 ML: 755 INJECTION, SOLUTION INTRAVENOUS at 15:09

## 2022-08-16 DIAGNOSIS — E11.9 DIABETES MELLITUS WITHOUT COMPLICATION (H): Primary | ICD-10-CM

## 2022-08-16 RX ORDER — INSULIN GLARGINE 100 [IU]/ML
INJECTION, SOLUTION SUBCUTANEOUS
Qty: 30 ML | Refills: 3 | Status: SHIPPED | OUTPATIENT
Start: 2022-08-16 | End: 2023-08-15

## 2022-08-16 NOTE — TELEPHONE ENCOUNTER
"  Last Prescription Date: 6/22/22  Last Qty/Refills: 0 / 0  Last Office Visit: 7/14/22  Future Office Visit: None     Requested Prescriptions   Pending Prescriptions Disp Refills     LANTUS SOLOSTAR 100 UNIT/ML soln [Pharmacy Med Name: LANTUS SOLOSTAR PEN INJ 3ML] 30 mL      Sig: INJECT 20 UNTIS SUBCUTANEOUS DAILY       Long Acting Insulin Protocol Passed - 8/16/2022  8:15 AM        Passed - Serum creatinine on file in past 12 months     Recent Labs   Lab Test 07/14/22  1542   CR 1.02       Ok to refill medication if creatinine is low          Passed - HgbA1C in past 3 or 6 months     If HgbA1C is 8 or greater, it needs to be on file within the past 3 months.  If less than 8, must be on file within the past 6 months.     Recent Labs   Lab Test 05/26/22  0928 05/30/18  1110 09/11/17  1213   A1C 12.3*   < >  --    [id number]  --   --  9.1*    < > = values in this interval not displayed.             Passed - Medication is active on med list        Passed - Patient is age 18 or older        Passed - Recent (6 mo) or future (30 days) visit within the authorizing provider's specialty     Patient had office visit in the last 6 months or has a visit in the next 30 days with authorizing provider or within the authorizing provider's specialty.  See \"Patient Info\" tab in inbasket, or \"Choose Columns\" in Meds & Orders section of the refill encounter.                 "

## 2022-08-18 ENCOUNTER — MYC MEDICAL ADVICE (OUTPATIENT)
Dept: FAMILY MEDICINE | Facility: OTHER | Age: 52
End: 2022-08-18

## 2022-08-18 DIAGNOSIS — N52.9 ERECTILE DYSFUNCTION, UNSPECIFIED ERECTILE DYSFUNCTION TYPE: Primary | ICD-10-CM

## 2022-08-18 RX ORDER — SILDENAFIL 100 MG/1
100 TABLET, FILM COATED ORAL DAILY PRN
Qty: 12 TABLET | Refills: 11 | Status: ON HOLD | OUTPATIENT
Start: 2022-08-18 | End: 2024-02-23

## 2022-08-29 ENCOUNTER — MYC MEDICAL ADVICE (OUTPATIENT)
Dept: FAMILY MEDICINE | Facility: OTHER | Age: 52
End: 2022-08-29

## 2022-08-29 DIAGNOSIS — C18.1 MALIGNANT NEOPLASM OF APPENDIX VERMIFORMIS (H): ICD-10-CM

## 2022-09-08 RX ORDER — OXYCODONE AND ACETAMINOPHEN 5; 325 MG/1; MG/1
1 TABLET ORAL EVERY 6 HOURS PRN
Qty: 90 TABLET | Refills: 0 | Status: SHIPPED | OUTPATIENT
Start: 2022-10-06 | End: 2022-10-26

## 2022-09-08 RX ORDER — OXYCODONE AND ACETAMINOPHEN 5; 325 MG/1; MG/1
1 TABLET ORAL EVERY 6 HOURS PRN
Qty: 90 TABLET | Refills: 0 | Status: SHIPPED | OUTPATIENT
Start: 2022-09-08 | End: 2022-10-26

## 2022-10-18 ENCOUNTER — TELEPHONE (OUTPATIENT)
Dept: FAMILY MEDICINE | Facility: OTHER | Age: 52
End: 2022-10-18

## 2022-10-18 NOTE — TELEPHONE ENCOUNTER
Patient is in a lot of pain from his hernia and cancer, wondering if he can be worked in to the schedule?        Please call patient back thank you   Miya Oliveira on 10/18/2022 at 11:31 AM

## 2022-10-21 ASSESSMENT — ANXIETY QUESTIONNAIRES
3. WORRYING TOO MUCH ABOUT DIFFERENT THINGS: SEVERAL DAYS
2. NOT BEING ABLE TO STOP OR CONTROL WORRYING: SEVERAL DAYS
IF YOU CHECKED OFF ANY PROBLEMS ON THIS QUESTIONNAIRE, HOW DIFFICULT HAVE THESE PROBLEMS MADE IT FOR YOU TO DO YOUR WORK, TAKE CARE OF THINGS AT HOME, OR GET ALONG WITH OTHER PEOPLE: SOMEWHAT DIFFICULT
7. FEELING AFRAID AS IF SOMETHING AWFUL MIGHT HAPPEN: NOT AT ALL
5. BEING SO RESTLESS THAT IT IS HARD TO SIT STILL: MORE THAN HALF THE DAYS
GAD7 TOTAL SCORE: 9
6. BECOMING EASILY ANNOYED OR IRRITABLE: SEVERAL DAYS
7. FEELING AFRAID AS IF SOMETHING AWFUL MIGHT HAPPEN: NOT AT ALL
4. TROUBLE RELAXING: NEARLY EVERY DAY
1. FEELING NERVOUS, ANXIOUS, OR ON EDGE: SEVERAL DAYS
8. IF YOU CHECKED OFF ANY PROBLEMS, HOW DIFFICULT HAVE THESE MADE IT FOR YOU TO DO YOUR WORK, TAKE CARE OF THINGS AT HOME, OR GET ALONG WITH OTHER PEOPLE?: SOMEWHAT DIFFICULT
GAD7 TOTAL SCORE: 9
GAD7 TOTAL SCORE: 9

## 2022-10-21 ASSESSMENT — PATIENT HEALTH QUESTIONNAIRE - PHQ9
10. IF YOU CHECKED OFF ANY PROBLEMS, HOW DIFFICULT HAVE THESE PROBLEMS MADE IT FOR YOU TO DO YOUR WORK, TAKE CARE OF THINGS AT HOME, OR GET ALONG WITH OTHER PEOPLE: SOMEWHAT DIFFICULT
SUM OF ALL RESPONSES TO PHQ QUESTIONS 1-9: 9
SUM OF ALL RESPONSES TO PHQ QUESTIONS 1-9: 9

## 2022-10-26 ENCOUNTER — OFFICE VISIT (OUTPATIENT)
Dept: FAMILY MEDICINE | Facility: OTHER | Age: 52
End: 2022-10-26
Attending: FAMILY MEDICINE
Payer: COMMERCIAL

## 2022-10-26 VITALS
RESPIRATION RATE: 16 BRPM | TEMPERATURE: 97.8 F | WEIGHT: 294.4 LBS | SYSTOLIC BLOOD PRESSURE: 136 MMHG | BODY MASS INDEX: 36.8 KG/M2 | HEART RATE: 63 BPM | DIASTOLIC BLOOD PRESSURE: 76 MMHG | OXYGEN SATURATION: 98 %

## 2022-10-26 DIAGNOSIS — N52.9 ERECTILE DYSFUNCTION, UNSPECIFIED ERECTILE DYSFUNCTION TYPE: ICD-10-CM

## 2022-10-26 DIAGNOSIS — Z79.4 TYPE 2 DIABETES MELLITUS WITH HYPERGLYCEMIA, WITH LONG-TERM CURRENT USE OF INSULIN (H): ICD-10-CM

## 2022-10-26 DIAGNOSIS — K43.6 VENTRAL HERNIA WITH OBSTRUCTION: ICD-10-CM

## 2022-10-26 DIAGNOSIS — I83.812 VARICOSE VEINS OF LEFT LOWER EXTREMITY WITH PAIN: ICD-10-CM

## 2022-10-26 DIAGNOSIS — E11.9 DIABETES MELLITUS WITHOUT COMPLICATION (H): ICD-10-CM

## 2022-10-26 DIAGNOSIS — C18.1 MALIGNANT NEOPLASM OF APPENDIX VERMIFORMIS (H): ICD-10-CM

## 2022-10-26 DIAGNOSIS — K43.2 RECURRENT VENTRAL HERNIA: ICD-10-CM

## 2022-10-26 DIAGNOSIS — F41.9 COVID-19 LONG HAULER MANIFESTING CHRONIC ANXIETY: Primary | ICD-10-CM

## 2022-10-26 DIAGNOSIS — Z12.11 COLON CANCER SCREENING: ICD-10-CM

## 2022-10-26 DIAGNOSIS — E11.65 TYPE 2 DIABETES MELLITUS WITH HYPERGLYCEMIA, WITH LONG-TERM CURRENT USE OF INSULIN (H): ICD-10-CM

## 2022-10-26 DIAGNOSIS — I10 HTN (HYPERTENSION), MALIGNANT: ICD-10-CM

## 2022-10-26 DIAGNOSIS — U09.9 COVID-19 LONG HAULER MANIFESTING CHRONIC ANXIETY: Primary | ICD-10-CM

## 2022-10-26 LAB
ERYTHROCYTE [DISTWIDTH] IN BLOOD BY AUTOMATED COUNT: 12.9 % (ref 10–15)
ERYTHROCYTE [SEDIMENTATION RATE] IN BLOOD BY WESTERGREN METHOD: 19 MM/HR (ref 0–20)
HBA1C MFR BLD: 10.2 % (ref 4–6.2)
HCT VFR BLD AUTO: 42.9 % (ref 40–53)
HGB BLD-MCNC: 15 G/DL (ref 13.3–17.7)
MCH RBC QN AUTO: 32.2 PG (ref 26.5–33)
MCHC RBC AUTO-ENTMCNC: 35 G/DL (ref 31.5–36.5)
MCV RBC AUTO: 92 FL (ref 78–100)
PLATELET # BLD AUTO: 291 10E3/UL (ref 150–450)
RBC # BLD AUTO: 4.66 10E6/UL (ref 4.4–5.9)
TSH SERPL DL<=0.005 MIU/L-ACNC: 0.58 MU/L (ref 0.4–4)
WBC # BLD AUTO: 8.7 10E3/UL (ref 4–11)

## 2022-10-26 PROCEDURE — 36415 COLL VENOUS BLD VENIPUNCTURE: CPT | Mod: ZL | Performed by: FAMILY MEDICINE

## 2022-10-26 PROCEDURE — 99214 OFFICE O/P EST MOD 30 MIN: CPT | Performed by: FAMILY MEDICINE

## 2022-10-26 PROCEDURE — 85027 COMPLETE CBC AUTOMATED: CPT | Mod: ZL | Performed by: FAMILY MEDICINE

## 2022-10-26 PROCEDURE — G0463 HOSPITAL OUTPT CLINIC VISIT: HCPCS | Mod: 25

## 2022-10-26 PROCEDURE — 83036 HEMOGLOBIN GLYCOSYLATED A1C: CPT | Mod: ZL | Performed by: FAMILY MEDICINE

## 2022-10-26 PROCEDURE — 84403 ASSAY OF TOTAL TESTOSTERONE: CPT | Mod: ZL | Performed by: FAMILY MEDICINE

## 2022-10-26 PROCEDURE — G0463 HOSPITAL OUTPT CLINIC VISIT: HCPCS

## 2022-10-26 PROCEDURE — 90682 RIV4 VACC RECOMBINANT DNA IM: CPT

## 2022-10-26 PROCEDURE — 86200 CCP ANTIBODY: CPT | Mod: ZL | Performed by: FAMILY MEDICINE

## 2022-10-26 PROCEDURE — 84443 ASSAY THYROID STIM HORMONE: CPT | Mod: ZL | Performed by: FAMILY MEDICINE

## 2022-10-26 PROCEDURE — 85652 RBC SED RATE AUTOMATED: CPT | Mod: ZL | Performed by: FAMILY MEDICINE

## 2022-10-26 RX ORDER — PEN NEEDLE, DIABETIC 32GX 5/32"
NEEDLE, DISPOSABLE MISCELLANEOUS
Qty: 300 EACH | Refills: 3 | Status: SHIPPED | OUTPATIENT
Start: 2022-10-26 | End: 2024-09-12

## 2022-10-26 RX ORDER — OXYCODONE AND ACETAMINOPHEN 5; 325 MG/1; MG/1
1 TABLET ORAL EVERY 6 HOURS PRN
Qty: 90 TABLET | Refills: 0 | Status: SHIPPED | OUTPATIENT
Start: 2022-10-26 | End: 2023-03-30

## 2022-10-26 RX ORDER — OXYCODONE AND ACETAMINOPHEN 5; 325 MG/1; MG/1
1 TABLET ORAL EVERY 4 HOURS PRN
Qty: 90 TABLET | Refills: 0 | Status: SHIPPED | OUTPATIENT
Start: 2022-12-21 | End: 2023-03-30

## 2022-10-26 RX ORDER — LISINOPRIL 40 MG/1
40 TABLET ORAL DAILY
Qty: 90 TABLET | Refills: 3 | Status: SHIPPED | OUTPATIENT
Start: 2022-10-26 | End: 2023-09-29

## 2022-10-26 RX ORDER — VENLAFAXINE HYDROCHLORIDE 75 MG/1
75 CAPSULE, EXTENDED RELEASE ORAL DAILY
Qty: 90 CAPSULE | Refills: 4 | Status: SHIPPED | OUTPATIENT
Start: 2022-10-26 | End: 2023-08-15

## 2022-10-26 RX ORDER — OXYCODONE AND ACETAMINOPHEN 5; 325 MG/1; MG/1
1 TABLET ORAL EVERY 6 HOURS PRN
Qty: 90 TABLET | Refills: 0 | Status: SHIPPED | OUTPATIENT
Start: 2022-11-23 | End: 2023-03-30

## 2022-10-26 ASSESSMENT — PAIN SCALES - GENERAL: PAINLEVEL: EXTREME PAIN (8)

## 2022-10-26 ASSESSMENT — ANXIETY QUESTIONNAIRES: GAD7 TOTAL SCORE: 9

## 2022-10-26 ASSESSMENT — PATIENT HEALTH QUESTIONNAIRE - PHQ9
SUM OF ALL RESPONSES TO PHQ QUESTIONS 1-9: 9
10. IF YOU CHECKED OFF ANY PROBLEMS, HOW DIFFICULT HAVE THESE PROBLEMS MADE IT FOR YOU TO DO YOUR WORK, TAKE CARE OF THINGS AT HOME, OR GET ALONG WITH OTHER PEOPLE: SOMEWHAT DIFFICULT

## 2022-10-26 NOTE — PROGRESS NOTES
Assessment & Plan     (F41.9,  U09.9) COVID-19 long hauler manifesting chronic anxiety  (primary encounter diagnosis)  Comment: has had diffuse myalgias, along with some arthralgias and low grade but worsening anxiety.  Plan: Sedimentation Rate (ESR), CBC W PLT No Diff,         TSH, venlafaxine (EFFEXOR XR) 75 MG 24 hr         capsule, Cyclic Citrullinated Peptide Antibody         IgG        His labs have so far ruled out alternative causes for the diffuse symptoms. Overall symptoms are stable, and there is no known current treatment for this, will need to observe for  Now.     (I10) HTN (hypertension), malignant  Comment: stable now  Plan: lisinopril (ZESTRIL) 40 MG tablet        redilled    (E11.65,  Z79.4) Type 2 diabetes mellitus with hyperglycemia, with long-term current use of insulin (H)  Comment: improving, but not at goal.  Encouraged even better medication compliance.  Plan: Hemoglobin A1c             (K43.6) Ventral hernia with obstruction  Comment: severe  Plan:  Chronic pain ongoing    (N52.9) Erectile dysfunction, unspecified erectile dysfunction type  Comment: Has no erections at all.  Viagra did not work, and would not be expected to.  I offered urology consult, to look into pumps, or injections, etc. 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   reviewed, percocet reviewed.   Plan: oxyCODONE-acetaminophen (PERCOCET) 5-325 MG         tablet, oxyCODONE-acetaminophen (PERCOCET)         5-325 MG tablet, oxyCODONE-acetaminophen         (PERCOCET) 5-325 MG tablet             (E11.9) Diabetes mellitus without complication (H)  Comment:    Plan: insulin pen needle (BD VELIA U/F) 32G X 4 MM         miscellaneous          "    (I83.812) Varicose veins of left lower extremity with pain  Comment: severe  Plan: causing pain but he is not currently interested in an ablative procedure.  Support hose in the interim advised.              BMI:   Estimated body mass index is 36.8 kg/m  as calculated from the following:    Height as of 7/14/22: 1.905 m (6' 3\").    Weight as of this encounter: 133.5 kg (294 lb 6.4 oz).   Weight management plan: is actively losing wt         Return in about 3 months (around 1/26/2023).    Mik Kumari MD  [facility]    Ariane Giraldo is a 52 year old, presenting for the following health issues:  RECHECK (Multiple issues)      History of Present Illness       Back Pain:  He presents for follow up of back pain. Patient's back pain is a chronic problem.  Location of back pain:  Right lower back and other  Description of back pain: sharp and shooting  Back pain spreads: right knee, left knee, right shoulder and left shoulder    Since patient first noticed back pain, pain is: rapidly worsening  Does back pain interfere with his job:  Yes      Mental Health Follow-up:  Patient presents to follow-up on Depression & Anxiety.Patient's depression since last visit has been:  Good  The patient is not having other symptoms associated with depression.  Patient's anxiety since last visit has been:  Good  The patient is not having other symptoms associated with anxiety.  Any significant life events: No  Patient is not feeling anxious or having panic attacks.  Patient has no concerns about alcohol or drug use.    Diabetes:   He presents for follow up of diabetes.  He is not checking blood glucose. He is concerned about other.  He is having numbness in feet and burning in feet. The patient has not had a diabetic eye exam in the last 12 months.         Reason for visit:  Follow up and ED. Severe pain espically on my right side and hernia    He eats 0-1 servings of fruits and vegetables daily.He consumes 2 " sweetened beverage(s) daily.He exercises with enough effort to increase his heart rate 10 to 19 minutes per day.  He exercises with enough effort to increase his heart rate 7 days per week.   He is taking medications regularly.    Today's PHQ-9         PHQ-9 Total Score: 9    PHQ-9 Q9 Thoughts of better off dead/self-harm past 2 weeks :   Not at all    How difficult have these problems made it for you to do your work, take care of things at home, or get along with other people: Somewhat difficult  Today's MESFIN-7 Score: 9     Recent Labs   Lab Test 10/26/22  1022 07/14/22  1542 05/26/22  0928 06/25/21  1046 12/17/20  1342 08/14/20  1332   A1C 10.2*  --  12.3* 13.4* 13.5* 12.0*   LDL  --  158*  --  159*  --  Cannot estimate LDL when triglyceride exceeds 400 mg/dL   HDL  --  36  --  34  --  29   TRIG  --  234*  --  297*  --  405*   ALT  --  21 25  --  34  --    CR  --  1.02 0.91 1.07 0.97 1.05   GFRESTIMATED  --  89 >90 73 82 75   GFRESTBLACK  --   --   --  89 >90 >90   POTASSIUM  --  4.3 4.5 3.7 4.5 4.2   TSH 0.58 0.87  --   --   --   --       Current Outpatient Medications   Medication     aspirin (ASA) 81 MG chewable tablet     carvedilol (COREG) 25 MG tablet     empagliflozin (JARDIANCE) 25 MG TABS tablet     furosemide (LASIX) 40 MG tablet     insulin aspart (NOVOLOG PEN) 100 UNIT/ML pen     insulin pen needle (BD VELIA U/F) 32G X 4 MM miscellaneous     LANTUS SOLOSTAR 100 UNIT/ML soln     lisinopril (ZESTRIL) 40 MG tablet     [START ON 12/21/2022] oxyCODONE-acetaminophen (PERCOCET) 5-325 MG tablet     [START ON 11/23/2022] oxyCODONE-acetaminophen (PERCOCET) 5-325 MG tablet     oxyCODONE-acetaminophen (PERCOCET) 5-325 MG tablet     rosuvastatin (CRESTOR) 20 MG tablet     sildenafil (VIAGRA) 100 MG tablet     terazosin (HYTRIN) 2 MG capsule     traMADol (ULTRAM) 50 MG tablet     traZODone (DESYREL) 50 MG tablet     venlafaxine (EFFEXOR XR) 75 MG 24 hr capsule     blood glucose (COOL BLOOD GLUCOSE TEST STRIPS) test  strip     blood glucose monitoring (CONTOUR NEXT TEST) test strip     Blood Glucose Monitoring Suppl (FIFTY50 GLUCOSE METER 2.0) w/Device KIT     Blood Pressure KIT     Continuous Blood Gluc  (DEXCOM G6 ) HEATHER     Continuous Blood Gluc Sensor (DEXCOM G6 SENSOR) MISC     Continuous Blood Gluc Transmit (DEXCOM G6 TRANSMITTER) MISC     lancets 28G MISC     metFORMIN (GLUCOPHAGE) 1000 MG tablet     No current facility-administered medications for this visit.     He has anxiety mostly about not feeling good since COVID last spring.  He had some other symptoms in March, like urinary retention in the spring.  Can now void, but he cannot now get erections. Not even weak ones. Viagra did not help at all.  Libido is fine.  Tired often overall.      Lots of right lower flank pain with all of this, hard to get up out of a chair at times.  He is hinting a lot and worried about Lyme.  Lots of joint pains overall now too.    Is not interested in addressing his diabetes currently.      Significant pain in left lower extremity.  Varicosities are getting worse.            Review of Systems         Objective    /76   Pulse 63   Temp 97.8  F (36.6  C) (Tympanic)   Resp 16   Wt 133.5 kg (294 lb 6.4 oz)   SpO2 98%   BMI 36.80 kg/m    Body mass index is 36.8 kg/m .  Physical Exam  Constitutional:       Appearance: Normal appearance.   Cardiovascular:      Rate and Rhythm: Normal rate and regular rhythm.   Musculoskeletal:      Comments: Right lower extremity with large varicosities, skin is intact.  No ulcerations.    Neurological:      General: No focal deficit present.      Mental Status: He is alert and oriented to person, place, and time.   Psychiatric:         Mood and Affect: Mood normal.         Behavior: Behavior normal.         Thought Content: Thought content normal.            Results for orders placed or performed in visit on 10/26/22   Cyclic Citrullinated Peptide Antibody IgG     Status: Normal    Result Value Ref Range    Cyclic Citrullinated Peptide Antibody IgG 0.6 <7.0 U/mL   Hemoglobin A1c     Status: Abnormal   Result Value Ref Range    Hemoglobin A1C 10.2 (H) 4.0 - 6.2 %   TSH     Status: Normal   Result Value Ref Range    TSH 0.58 0.40 - 4.00 mU/L   CBC W PLT No Diff     Status: Normal   Result Value Ref Range    WBC Count 8.7 4.0 - 11.0 10e3/uL    RBC Count 4.66 4.40 - 5.90 10e6/uL    Hemoglobin 15.0 13.3 - 17.7 g/dL    Hematocrit 42.9 40.0 - 53.0 %    MCV 92 78 - 100 fL    MCH 32.2 26.5 - 33.0 pg    MCHC 35.0 31.5 - 36.5 g/dL    RDW 12.9 10.0 - 15.0 %    Platelet Count 291 150 - 450 10e3/uL   Sedimentation Rate (ESR)     Status: Normal   Result Value Ref Range    Erythrocyte Sedimentation Rate 19 0 - 20 mm/hr

## 2022-10-26 NOTE — NURSING NOTE
"Chief Complaint   Patient presents with     RECHECK     Multiple issues       Initial /76   Pulse 63   Temp 97.8  F (36.6  C) (Tympanic)   Resp 16   Wt 133.5 kg (294 lb 6.4 oz)   SpO2 98%   BMI 36.80 kg/m   Estimated body mass index is 36.8 kg/m  as calculated from the following:    Height as of 7/14/22: 1.905 m (6' 3\").    Weight as of this encounter: 133.5 kg (294 lb 6.4 oz).  Medication Reconciliation: complete    FOOD SECURITY SCREENING QUESTIONS  Hunger Vital Signs:  Within the past 12 months we worried whether our food would run out before we got money to buy more. Never  Within the past 12 months the food we bought just didn't last and we didn't have money to get more. Never  Laurence Jose LPN 10/26/2022 9:22 AM    Do you have a healthcare directive? Yes        "

## 2022-10-27 LAB — CCP AB SER IA-ACNC: 0.6 U/ML

## 2022-10-28 RX ORDER — TRAMADOL HYDROCHLORIDE 50 MG/1
TABLET ORAL
Qty: 90 TABLET | Refills: 2 | Status: SHIPPED | OUTPATIENT
Start: 2022-10-28 | End: 2023-03-30

## 2022-10-28 NOTE — TELEPHONE ENCOUNTER
[facility] sent Rx request for the following:      Requested Prescriptions   Pending Prescriptions Disp Refills     traMADol (ULTRAM) 50 MG tablet [Pharmacy Med Name: TRAMADOL 50MG TABLETS] 90 tablet      Sig: TAKE 1 TABLET(50 MG) BY MOUTH FOUR TIMES DAILY   Last Prescription Date:   5/26/22  Last Fill Qty/Refills:         90, R-5  (This order has not been released to its destination.)  Last Office Visit:              10/26/22   Future Office visit:           None    Per LOV note: Return in about 3 months (around 1/26/2023).    Qiana Kay RN .............. 10/28/2022  4:34 PM

## 2022-11-02 ENCOUNTER — MYC MEDICAL ADVICE (OUTPATIENT)
Dept: FAMILY MEDICINE | Facility: OTHER | Age: 52
End: 2022-11-02

## 2022-11-02 DIAGNOSIS — N52.9 ERECTILE DYSFUNCTION, UNSPECIFIED ERECTILE DYSFUNCTION TYPE: Primary | ICD-10-CM

## 2022-11-02 LAB — TESTOST SERPL-MCNC: 261 NG/DL (ref 240–950)

## 2023-03-30 ENCOUNTER — OFFICE VISIT (OUTPATIENT)
Dept: FAMILY MEDICINE | Facility: OTHER | Age: 53
End: 2023-03-30
Attending: FAMILY MEDICINE
Payer: COMMERCIAL

## 2023-03-30 VITALS
WEIGHT: 304.6 LBS | BODY MASS INDEX: 38.07 KG/M2 | DIASTOLIC BLOOD PRESSURE: 82 MMHG | RESPIRATION RATE: 16 BRPM | OXYGEN SATURATION: 98 % | TEMPERATURE: 98.3 F | HEART RATE: 74 BPM | SYSTOLIC BLOOD PRESSURE: 132 MMHG

## 2023-03-30 DIAGNOSIS — F33.1 MAJOR DEPRESSIVE DISORDER, RECURRENT EPISODE, MODERATE (H): ICD-10-CM

## 2023-03-30 DIAGNOSIS — N52.9 ERECTILE DYSFUNCTION, UNSPECIFIED ERECTILE DYSFUNCTION TYPE: ICD-10-CM

## 2023-03-30 DIAGNOSIS — C18.1 MALIGNANT NEOPLASM OF APPENDIX VERMIFORMIS (H): ICD-10-CM

## 2023-03-30 DIAGNOSIS — E11.65 TYPE 2 DIABETES MELLITUS WITH HYPERGLYCEMIA, WITH LONG-TERM CURRENT USE OF INSULIN (H): Primary | ICD-10-CM

## 2023-03-30 DIAGNOSIS — Z79.4 TYPE 2 DIABETES MELLITUS WITH HYPERGLYCEMIA, WITH LONG-TERM CURRENT USE OF INSULIN (H): Primary | ICD-10-CM

## 2023-03-30 LAB
ALBUMIN SERPL BCG-MCNC: 4.1 G/DL (ref 3.5–5.2)
ALP SERPL-CCNC: 128 U/L (ref 40–129)
ALT SERPL W P-5'-P-CCNC: 28 U/L (ref 10–50)
ANION GAP SERPL CALCULATED.3IONS-SCNC: 8 MMOL/L (ref 7–15)
AST SERPL W P-5'-P-CCNC: 23 U/L (ref 10–50)
BILIRUB SERPL-MCNC: 0.4 MG/DL
BUN SERPL-MCNC: 11.7 MG/DL (ref 6–20)
CALCIUM SERPL-MCNC: 9.6 MG/DL (ref 8.6–10)
CHLORIDE SERPL-SCNC: 97 MMOL/L (ref 98–107)
CREAT SERPL-MCNC: 0.78 MG/DL (ref 0.67–1.17)
DEPRECATED HCO3 PLAS-SCNC: 30 MMOL/L (ref 22–29)
ERYTHROCYTE [DISTWIDTH] IN BLOOD BY AUTOMATED COUNT: 13.1 % (ref 10–15)
GFR SERPL CREATININE-BSD FRML MDRD: >90 ML/MIN/1.73M2
GLUCOSE SERPL-MCNC: 236 MG/DL (ref 70–99)
HBA1C MFR BLD: 11 % (ref 4–6.2)
HCT VFR BLD AUTO: 43.6 % (ref 40–53)
HGB BLD-MCNC: 15.3 G/DL (ref 13.3–17.7)
MCH RBC QN AUTO: 32.2 PG (ref 26.5–33)
MCHC RBC AUTO-ENTMCNC: 35.1 G/DL (ref 31.5–36.5)
MCV RBC AUTO: 92 FL (ref 78–100)
PLATELET # BLD AUTO: 271 10E3/UL (ref 150–450)
POTASSIUM SERPL-SCNC: 4.3 MMOL/L (ref 3.4–5.3)
PROT SERPL-MCNC: 7.8 G/DL (ref 6.4–8.3)
RBC # BLD AUTO: 4.75 10E6/UL (ref 4.4–5.9)
SODIUM SERPL-SCNC: 135 MMOL/L (ref 136–145)
WBC # BLD AUTO: 9.2 10E3/UL (ref 4–11)

## 2023-03-30 PROCEDURE — G0463 HOSPITAL OUTPT CLINIC VISIT: HCPCS

## 2023-03-30 PROCEDURE — G0463 HOSPITAL OUTPT CLINIC VISIT: HCPCS | Mod: 25

## 2023-03-30 PROCEDURE — 80053 COMPREHEN METABOLIC PANEL: CPT | Mod: ZL | Performed by: FAMILY MEDICINE

## 2023-03-30 PROCEDURE — 0124A COVID-19 VACCINE BIVALENT BOOSTER 12+ (PFIZER): CPT

## 2023-03-30 PROCEDURE — 84403 ASSAY OF TOTAL TESTOSTERONE: CPT | Mod: ZL | Performed by: FAMILY MEDICINE

## 2023-03-30 PROCEDURE — 36415 COLL VENOUS BLD VENIPUNCTURE: CPT | Mod: ZL | Performed by: FAMILY MEDICINE

## 2023-03-30 PROCEDURE — 99214 OFFICE O/P EST MOD 30 MIN: CPT | Performed by: FAMILY MEDICINE

## 2023-03-30 PROCEDURE — 85027 COMPLETE CBC AUTOMATED: CPT | Mod: ZL | Performed by: FAMILY MEDICINE

## 2023-03-30 PROCEDURE — 83036 HEMOGLOBIN GLYCOSYLATED A1C: CPT | Mod: ZL | Performed by: FAMILY MEDICINE

## 2023-03-30 RX ORDER — OXYCODONE AND ACETAMINOPHEN 5; 325 MG/1; MG/1
1 TABLET ORAL EVERY 6 HOURS PRN
Qty: 90 TABLET | Refills: 0 | Status: SHIPPED | OUTPATIENT
Start: 2023-04-27 | End: 2023-08-15

## 2023-03-30 RX ORDER — OXYCODONE AND ACETAMINOPHEN 5; 325 MG/1; MG/1
1 TABLET ORAL EVERY 6 HOURS PRN
Qty: 90 TABLET | Refills: 0 | Status: SHIPPED | OUTPATIENT
Start: 2023-05-25 | End: 2023-06-14

## 2023-03-30 RX ORDER — VENLAFAXINE HYDROCHLORIDE 150 MG/1
150 CAPSULE, EXTENDED RELEASE ORAL DAILY
Qty: 90 CAPSULE | Refills: 4 | Status: SHIPPED | OUTPATIENT
Start: 2023-03-30 | End: 2023-08-15

## 2023-03-30 RX ORDER — OXYCODONE AND ACETAMINOPHEN 5; 325 MG/1; MG/1
1 TABLET ORAL EVERY 4 HOURS PRN
Qty: 90 TABLET | Refills: 0 | Status: SHIPPED | OUTPATIENT
Start: 2023-03-30 | End: 2023-06-14

## 2023-03-30 ASSESSMENT — PAIN SCALES - GENERAL: PAINLEVEL: EXTREME PAIN (8)

## 2023-03-30 ASSESSMENT — PATIENT HEALTH QUESTIONNAIRE - PHQ9
SUM OF ALL RESPONSES TO PHQ QUESTIONS 1-9: 7
SUM OF ALL RESPONSES TO PHQ QUESTIONS 1-9: 7
10. IF YOU CHECKED OFF ANY PROBLEMS, HOW DIFFICULT HAVE THESE PROBLEMS MADE IT FOR YOU TO DO YOUR WORK, TAKE CARE OF THINGS AT HOME, OR GET ALONG WITH OTHER PEOPLE: NOT DIFFICULT AT ALL

## 2023-03-30 ASSESSMENT — ENCOUNTER SYMPTOMS: FATIGUE: 1

## 2023-03-30 NOTE — PROGRESS NOTES
Assessment & Plan     (E11.65,  Z79.4) Type 2 diabetes mellitus with hyperglycemia, with long-term current use of insulin (H)  (primary encounter diagnosis)  Comment: fatigue complaints are very hard to fully pin done. He had COVID 1 year ago, and perhaps is still dealing with long haul covid. Last abdomen CT was 7/29/22, with no new cancer findings. I want him to work on lower sugars, with compliance with the meds being the biggest barrier. Given variable compliance, no dose increases today.   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 Uses this sparingly.  revied and stable. Filled for 3 more months  Plan: oxyCODONE-acetaminophen (PERCOCET) 5-325 MG         tablet, oxyCODONE-acetaminophen (PERCOCET)         5-325 MG tablet, oxyCODONE-acetaminophen         (PERCOCET) 5-325 MG tablet, Comprehensive         Metabolic Panel, CBC W PLT No Diff             (F33.1) Major depressive disorder, recurrent episode, moderate (H)  Comment: mildly worsened, so increased dose from 75 milligram to 150 milligram.  Plan: venlafaxine (EFFEXOR XR) 150 MG 24 hr capsule                        Depression Screening Follow Up    PHQ 3/30/2023   PHQ-9 Total Score 7   Q9: Thoughts of better off dead/self-harm past 2 weeks Several days   F/U: Thoughts of suicide or self-harm No   F/U: Safety concerns No                 Follow Up    Follow Up Actions Taken  Crisis resource information provided in the After Visit Summary  meds increased    Discussed the following ways the patient can remain in a safe environment:  be around others      No follow-ups on file.    Mik Kumari MD  [facility]    Ariane Giraldo is a 52 year old, presenting for the following health issues:  Fatigue  No flowsheet data found.  Fatigue  Associated symptoms include fatigue.   History of Present Illness       Back Pain:  He presents for follow up of back pain.  Patient's back pain is a chronic problem.  Location of back pain:  Right middle of back  Description of back pain: sharp and shooting  Back pain spreads: nowhere    Since patient first noticed back pain, pain is: gradually worsening  Does back pain interfere with his job:  No      Mental Health Follow-up:  Patient presents to follow-up on Depression.Patient's depression since last visit has been:  No change  The patient is not having other symptoms associated with depression.      Any significant life events: No  Patient is not feeling anxious or having panic attacks.  Patient has no concerns about alcohol or drug use.    Diabetes:   He presents for follow up of diabetes.  He is not checking blood glucose. He has no concerns regarding his diabetes at this time.  He is having numbness in feet and burning in feet. The patient has not had a diabetic eye exam in the last 12 months.         He eats 0-1 servings of fruits and vegetables daily.He consumes 0 sweetened beverage(s) daily.He exercises with enough effort to increase his heart rate 20 to 29 minutes per day.  He exercises with enough effort to increase his heart rate 3 or less days per week.   He is taking medications regularly.    Today's PHQ-9         PHQ-9 Total Score: 7    PHQ-9 Q9 Thoughts of better off dead/self-harm past 2 weeks :   Several days  Thoughts of suicide or self harm: (P) No  Self-harm Plan:     Self-harm Action:       Safety concerns for self or others: (P) No    How difficult have these problems made it for you to do your work, take care of things at home, or get along with other people: Not difficult at all       Pain History:  When did you first notice your pain? 10 years ago   Have you seen this provider for your pain in the past?   Yes   Where in your body do you have pain? Back, abdomen, obliques   Are you seeing anyone else for your pain? No    PHQ-9 SCORE 7/14/2022 10/21/2022 3/30/2023   PHQ-9 Total Score MyChart 17 (Moderately severe  "depression) 9 (Mild depression) 7 (Mild depression)   PHQ-9 Total Score 17 9 7       MESFIN-7 SCORE 5/26/2022 7/14/2022 10/21/2022   Total Score - 9 (mild anxiety) 9 (mild anxiety)   Total Score 0 9 9               Chronic Pain Follow Up:    Location of pain: abdomen muscles  Analgesia/pain control:    - Recent changes:  no    - Overall control: Tolerable with discomfort    - Current treatments: opiates, topicals, exercise   Adherence:     - Do you ever take more pain medicine than prescribed? No    - When did you take your last dose of pain medicine?  Weeks ago   Adverse effects: No   PDMP Review       Value Time User    State PDMP site checked  Yes 9/8/2022  6:58 AM Mik Kumari MD        Last CSA Agreement:   CSA -- Patient Level:     [Media Unavailable] Controlled Substance Agreement - Opioid - Scan on 7/15/2022 11:31 AM: Opioid   [Media Unavailable] Controlled Substance Agreement - Opioid - Scan on 6/28/2021  8:45 AM: Opioid Agreement       Last UDS: 6/29/2021              Has diabetes, not checking sugars at all. He is having significant ongoing fatigue. Feels \"sore everywhere\". Lot of pain in abdomen and obliques, from his cancer surgery.    Recent Labs   Lab Test 10/26/22  1022 07/14/22  1542 05/26/22  0928 06/25/21  1046 12/17/20  1342 08/14/20  1332   A1C 10.2*  --  12.3* 13.4* 13.5* 12.0*   LDL  --  158*  --  159*  --  Cannot estimate LDL when triglyceride exceeds 400 mg/dL   HDL  --  36  --  34  --  29   TRIG  --  234*  --  297*  --  405*   ALT  --  21 25  --  34  --    CR  --  1.02 0.91 1.07 0.97 1.05   GFRESTIMATED  --  89 >90 73 82 75   GFRESTBLACK  --   --   --  89 >90 >90   POTASSIUM  --  4.3 4.5 3.7 4.5 4.2   TSH 0.58 0.87  --   --   --   --             Review of Systems   Constitutional: Positive for fatigue.            Objective    /82   Pulse 74   Temp 98.3  F (36.8  C) (Tympanic)   Resp 16   Wt 138.2 kg (304 lb 9.6 oz)   SpO2 98%   BMI 38.07 kg/m    Body mass index is 38.07 " kg/m .  Physical Exam  Constitutional:       Appearance: Normal appearance.   Cardiovascular:      Rate and Rhythm: Normal rate and regular rhythm.   Pulmonary:      Effort: Pulmonary effort is normal.   Abdominal:      Comments: Very large ventral hernia, over 30 cm in diameter. Mildly tender, not reducible.    Neurological:      General: No focal deficit present.      Mental Status: He is alert and oriented to person, place, and time.            Results for orders placed or performed in visit on 03/30/23   CBC W PLT No Diff     Status: Normal   Result Value Ref Range    WBC Count 9.2 4.0 - 11.0 10e3/uL    RBC Count 4.75 4.40 - 5.90 10e6/uL    Hemoglobin 15.3 13.3 - 17.7 g/dL    Hematocrit 43.6 40.0 - 53.0 %    MCV 92 78 - 100 fL    MCH 32.2 26.5 - 33.0 pg    MCHC 35.1 31.5 - 36.5 g/dL    RDW 13.1 10.0 - 15.0 %    Platelet Count 271 150 - 450 10e3/uL   Comprehensive Metabolic Panel     Status: Abnormal   Result Value Ref Range    Sodium 135 (L) 136 - 145 mmol/L    Potassium 4.3 3.4 - 5.3 mmol/L    Chloride 97 (L) 98 - 107 mmol/L    Carbon Dioxide (CO2) 30 (H) 22 - 29 mmol/L    Anion Gap 8 7 - 15 mmol/L    Urea Nitrogen 11.7 6.0 - 20.0 mg/dL    Creatinine 0.78 0.67 - 1.17 mg/dL    Calcium 9.6 8.6 - 10.0 mg/dL    Glucose 236 (H) 70 - 99 mg/dL    Alkaline Phosphatase 128 40 - 129 U/L    AST 23 10 - 50 U/L    ALT 28 10 - 50 U/L    Protein Total 7.8 6.4 - 8.3 g/dL    Albumin 4.1 3.5 - 5.2 g/dL    Bilirubin Total 0.4 <=1.2 mg/dL    GFR Estimate >90 >60 mL/min/1.73m2   Hemoglobin A1c     Status: Abnormal   Result Value Ref Range    Hemoglobin A1C 11.0 (H) 4.0 - 6.2 %   Testosterone, Total     Status: Normal   Result Value Ref Range    Testosterone Total 284 240 - 950 ng/dL

## 2023-03-30 NOTE — NURSING NOTE
"Chief Complaint   Patient presents with     Fatigue       Initial /82   Pulse 74   Temp 98.3  F (36.8  C) (Tympanic)   Resp 16   Wt 138.2 kg (304 lb 9.6 oz)   SpO2 98%   BMI 38.07 kg/m   Estimated body mass index is 38.07 kg/m  as calculated from the following:    Height as of 7/14/22: 1.905 m (6' 3\").    Weight as of this encounter: 138.2 kg (304 lb 9.6 oz).  Medication Reconciliation: complete    FOOD SECURITY SCREENING QUESTIONS  Hunger Vital Signs:  Within the past 12 months we worried whether our food would run out before we got money to buy more. Never  Within the past 12 months the food we bought just didn't last and we didn't have money to get more. Never  Laurence Jose LPN 3/30/2023 2:24 PM      "

## 2023-04-01 ENCOUNTER — TRANSFERRED RECORDS (OUTPATIENT)
Dept: MULTI SPECIALTY CLINIC | Facility: CLINIC | Age: 53
End: 2023-04-01

## 2023-04-01 LAB — RETINOPATHY: NORMAL

## 2023-04-02 LAB — TESTOST SERPL-MCNC: 284 NG/DL (ref 240–950)

## 2023-04-20 DIAGNOSIS — I10 HTN (HYPERTENSION), MALIGNANT: ICD-10-CM

## 2023-04-24 NOTE — TELEPHONE ENCOUNTER
[id number] sent Rx request for the following:      Requested Prescriptions   Pending Prescriptions Disp Refills     furosemide (LASIX) 40 MG tablet [Pharmacy Med Name: FUROSEMIDE 40MG TABLETS] 90 tablet 3     Sig: TAKE 1 TABLET(40 MG) BY MOUTH DAILY   Last Prescription Date:   3/14/22  Last Fill Qty/Refills:         90, R-3      Diuretics (Including Combos) Protocol Failed - 4/24/2023  2:43 PM        Failed - Normal serum sodium on file in past 12 months     Recent Labs   Lab Test 03/30/23  1546   *           carvedilol (COREG) 25 MG tablet [Pharmacy Med Name: CARVEDILOL 25MG TABLETS] 180 tablet 3     Sig: TAKE 1 TABLET(25 MG) BY MOUTH TWICE DAILY WITH MEALS   Last Prescription Date:   3/14/22  Last Fill Qty/Refills:         180, R-3      Last Office Visit:              3/30/23  Future Office visit:           None    Per LOV note: Return in about 3 months (around 6/30/2023) for Follow up, with me.    Qiana Kay RN .............. 4/24/2023  2:49 PM

## 2023-04-25 RX ORDER — FUROSEMIDE 40 MG
TABLET ORAL
Qty: 90 TABLET | Refills: 0 | Status: SHIPPED | OUTPATIENT
Start: 2023-04-25 | End: 2023-06-15

## 2023-04-25 RX ORDER — CARVEDILOL 25 MG/1
TABLET ORAL
Qty: 180 TABLET | Refills: 0 | Status: SHIPPED | OUTPATIENT
Start: 2023-04-25 | End: 2023-07-20

## 2023-06-03 DIAGNOSIS — K43.2 RECURRENT VENTRAL HERNIA: ICD-10-CM

## 2023-06-03 DIAGNOSIS — C18.1 MALIGNANT NEOPLASM OF APPENDIX VERMIFORMIS (H): ICD-10-CM

## 2023-06-08 RX ORDER — TRAMADOL HYDROCHLORIDE 50 MG/1
TABLET ORAL
Qty: 90 TABLET | Refills: 0 | OUTPATIENT
Start: 2023-06-08

## 2023-06-08 NOTE — TELEPHONE ENCOUNTER
[facility] sent Rx request for the following:    traMADol (ULTRAM) 50 MG tablet (Discontinued) 90 tablet 2 10/28/2022 3/30/2023 No   Sig: TAKE 1 TABLET(50 MG) BY MOUTH FOUR TIMES DAILY     Last Office Visit:              3/30/23   Future Office visit:           None    Per LOV note: Return in about 3 months (around 6/30/2023) for Follow up, with me.    Pt due for 3-month follow up around 6/30/23. Routing to provider for refill consideration. Routing to Unit scheduling pool, to assist Pt in scheduling appointment.     In clinical absence of patient's primary, Mik Kumari, patient is requesting that this message be sent to the covering provider for consideration please.    Qiana Kay RN .............. 6/8/2023  2:36 PM

## 2023-06-08 NOTE — TELEPHONE ENCOUNTER
Review of chart it appears this was discontinued in March 2023.  Patient is currently on oxycodone, last refill was provided on 5/25/2023 by PCP.    Emilia Whitfield PA-C  6/8/2023  3:01 PM

## 2023-06-14 ENCOUNTER — MYC REFILL (OUTPATIENT)
Dept: FAMILY MEDICINE | Facility: OTHER | Age: 53
End: 2023-06-14
Payer: COMMERCIAL

## 2023-06-14 DIAGNOSIS — C18.1 MALIGNANT NEOPLASM OF APPENDIX VERMIFORMIS (H): ICD-10-CM

## 2023-06-14 RX ORDER — OXYCODONE AND ACETAMINOPHEN 5; 325 MG/1; MG/1
1 TABLET ORAL EVERY 6 HOURS PRN
Qty: 90 TABLET | Refills: 0 | Status: CANCELLED | OUTPATIENT
Start: 2023-06-14

## 2023-06-15 ENCOUNTER — MYC REFILL (OUTPATIENT)
Dept: FAMILY MEDICINE | Facility: OTHER | Age: 53
End: 2023-06-15
Payer: COMMERCIAL

## 2023-06-15 DIAGNOSIS — I10 HTN (HYPERTENSION), MALIGNANT: ICD-10-CM

## 2023-06-15 DIAGNOSIS — C18.1 MALIGNANT NEOPLASM OF APPENDIX VERMIFORMIS (H): ICD-10-CM

## 2023-06-15 RX ORDER — OXYCODONE AND ACETAMINOPHEN 5; 325 MG/1; MG/1
1 TABLET ORAL EVERY 4 HOURS PRN
Qty: 90 TABLET | Refills: 0 | Status: CANCELLED | OUTPATIENT
Start: 2023-06-15

## 2023-06-16 ENCOUNTER — MYC REFILL (OUTPATIENT)
Dept: FAMILY MEDICINE | Facility: OTHER | Age: 53
End: 2023-06-16
Payer: COMMERCIAL

## 2023-06-16 DIAGNOSIS — C18.1 MALIGNANT NEOPLASM OF APPENDIX VERMIFORMIS (H): ICD-10-CM

## 2023-06-16 RX ORDER — OXYCODONE AND ACETAMINOPHEN 5; 325 MG/1; MG/1
1 TABLET ORAL EVERY 6 HOURS PRN
Qty: 90 TABLET | Refills: 0 | Status: SHIPPED | OUTPATIENT
Start: 2023-06-16 | End: 2023-08-15

## 2023-06-16 RX ORDER — OXYCODONE AND ACETAMINOPHEN 5; 325 MG/1; MG/1
1 TABLET ORAL EVERY 6 HOURS PRN
Qty: 90 TABLET | Refills: 0 | Status: CANCELLED | OUTPATIENT
Start: 2023-06-16

## 2023-06-16 RX ORDER — OXYCODONE AND ACETAMINOPHEN 5; 325 MG/1; MG/1
1 TABLET ORAL EVERY 4 HOURS PRN
Qty: 90 TABLET | Refills: 0 | Status: SHIPPED | OUTPATIENT
Start: 2023-07-14 | End: 2023-08-15

## 2023-06-16 RX ORDER — FUROSEMIDE 40 MG
40 TABLET ORAL DAILY
Qty: 90 TABLET | Refills: 0 | Status: SHIPPED | OUTPATIENT
Start: 2023-06-16 | End: 2023-08-15

## 2023-06-16 NOTE — TELEPHONE ENCOUNTER
Requested Prescriptions   Pending Prescriptions Disp Refills     oxyCODONE-acetaminophen (PERCOCET) 5-325 MG tablet 90 tablet 0     Sig: Take 1 tablet by mouth every 4 hours as needed for pain   Last Prescription Date:   3/30/23  Last Fill Qty/Refills:         90, R-0       oxyCODONE-acetaminophen (PERCOCET) 5-325 MG tablet 90 tablet 0     Sig: Take 1 tablet by mouth every 6 hours as needed for pain   Last Prescription Date:   5/25/23  Last Fill Qty/Refills:         90, R-0    Patient Comment: Dont take often buy help reduced my from my hernia caused by cancer.    Routing to provider to choose prescription from above.    Last Office Visit:              3/30/23   Future Office visit:           None     Per LOV note: Return in about 3 months (around 6/30/2023) for Follow up, with me.     Pt due for follow up soon. See other MyChart already routed to scheduling.     Qiana Kay RN .............. 6/16/2023  12:44 PM

## 2023-07-18 DIAGNOSIS — I10 HTN (HYPERTENSION), MALIGNANT: ICD-10-CM

## 2023-07-18 DIAGNOSIS — E11.9 DIABETES MELLITUS WITHOUT COMPLICATION (H): ICD-10-CM

## 2023-07-20 RX ORDER — CARVEDILOL 25 MG/1
TABLET ORAL
Qty: 180 TABLET | Refills: 0 | Status: SHIPPED | OUTPATIENT
Start: 2023-07-20 | End: 2023-10-17

## 2023-07-20 RX ORDER — INSULIN ASPART 100 [IU]/ML
INJECTION, SOLUTION INTRAVENOUS; SUBCUTANEOUS
Qty: 45 ML | Refills: 0 | Status: SHIPPED | OUTPATIENT
Start: 2023-07-20 | End: 2023-10-25

## 2023-07-20 NOTE — TELEPHONE ENCOUNTER
[id number] of [place] sent Rx request for the following:      Requested Prescriptions   Pending Prescriptions Disp Refills     carvedilol (COREG) 25 MG tablet [Pharmacy Med Name: CARVEDILOL 25MG TABLETS] 180 tablet 0     Sig: TAKE 1 TABLET(25 MG) BY MOUTH TWICE DAILY WITH MEALS   Last Prescription Date:   4/25/23  Last Fill Qty/Refills:         180, R-0        Insulin Aspart FlexPen 100 UNIT/ML SOPN [Pharmacy Med Name: INSULIN ASPART FLEXPEN INJ, 3ML] 45 mL 4     Sig: ADMINISTER 20 UNITS UNDER THE SKIN THREE TIMES DAILY WITH MEALS   Last Prescription Date:   8/16/22  Last Fill Qty/Refills:         30 ml, R-3     Short Acting Insulin Protocol Failed - 7/20/2023  2:15 PM        Failed - HgbA1C in past 3 or 6 months     If HgbA1C is 8 or greater, it needs to be on file within the past 3 months.  If less than 8, must be on file within the past 6 months.     Recent Labs   Lab Test 03/30/23  1546 05/30/18  1110 09/11/17  1213   A1C 11.0*   < >  --    [id number]  --   --  9.1*    < > = values in this interval not displayed.        Last Office Visit:              3/30/23  Future Office visit:           None    Per LOV note: Return in about 3 months (around 6/30/2023) for Follow up, with me.    Pt due for follow up. Routing to provider for refill consideration. Routing to Unit scheduling pool, to assist Pt in scheduling appointment.     Qiana Kay RN .............. 7/20/2023  2:17 PM

## 2023-07-22 DIAGNOSIS — I10 HTN (HYPERTENSION), MALIGNANT: ICD-10-CM

## 2023-07-24 RX ORDER — FUROSEMIDE 40 MG
TABLET ORAL
Qty: 90 TABLET | Refills: 0 | OUTPATIENT
Start: 2023-07-24

## 2023-07-24 NOTE — TELEPHONE ENCOUNTER
Disp Refills Start End JESÚS   furosemide (LASIX) 40 MG tablet 90 tablet 0 6/16/2023  No   Sig - Route: Take 1 tablet (40 mg) by mouth daily    To Martha Ricci requesting    Refill request to soon.  Samantha Olmos, RN on 7/24/2023 at 3:54 PM

## 2023-07-26 NOTE — TELEPHONE ENCOUNTER
LMTCB to schedule appointment.    Pt due for follow up with TJP.    Iris Smith on 7/26/2023 at 3:41 PM

## 2023-08-03 ENCOUNTER — MYC MEDICAL ADVICE (OUTPATIENT)
Dept: FAMILY MEDICINE | Facility: OTHER | Age: 53
End: 2023-08-03
Payer: COMMERCIAL

## 2023-08-15 ENCOUNTER — HOSPITAL ENCOUNTER (OUTPATIENT)
Dept: GENERAL RADIOLOGY | Facility: OTHER | Age: 53
Discharge: HOME | End: 2023-08-15
Attending: FAMILY MEDICINE
Payer: COMMERCIAL

## 2023-08-15 ENCOUNTER — OFFICE VISIT (OUTPATIENT)
Dept: FAMILY MEDICINE | Facility: OTHER | Age: 53
End: 2023-08-15
Attending: FAMILY MEDICINE
Payer: COMMERCIAL

## 2023-08-15 VITALS
SYSTOLIC BLOOD PRESSURE: 149 MMHG | OXYGEN SATURATION: 96 % | BODY MASS INDEX: 37.82 KG/M2 | HEART RATE: 64 BPM | HEIGHT: 75 IN | RESPIRATION RATE: 17 BRPM | DIASTOLIC BLOOD PRESSURE: 104 MMHG | WEIGHT: 304.2 LBS | TEMPERATURE: 98.6 F

## 2023-08-15 DIAGNOSIS — Z79.4 TYPE 2 DIABETES MELLITUS WITH HYPERGLYCEMIA, WITH LONG-TERM CURRENT USE OF INSULIN (H): ICD-10-CM

## 2023-08-15 DIAGNOSIS — C18.1 MALIGNANT NEOPLASM OF APPENDIX VERMIFORMIS (H): Primary | ICD-10-CM

## 2023-08-15 DIAGNOSIS — E11.65 TYPE 2 DIABETES MELLITUS WITH HYPERGLYCEMIA, WITH LONG-TERM CURRENT USE OF INSULIN (H): ICD-10-CM

## 2023-08-15 DIAGNOSIS — C18.1 MALIGNANT NEOPLASM OF APPENDIX VERMIFORMIS (H): ICD-10-CM

## 2023-08-15 DIAGNOSIS — I10 ESSENTIAL HYPERTENSION: ICD-10-CM

## 2023-08-15 LAB
ALBUMIN SERPL BCG-MCNC: 4.1 G/DL (ref 3.5–5.2)
ALP SERPL-CCNC: 142 U/L (ref 40–129)
ALT SERPL W P-5'-P-CCNC: 36 U/L (ref 0–70)
ANION GAP SERPL CALCULATED.3IONS-SCNC: 8 MMOL/L (ref 7–15)
AST SERPL W P-5'-P-CCNC: 35 U/L (ref 0–45)
BILIRUB SERPL-MCNC: 0.4 MG/DL
BUN SERPL-MCNC: 15.1 MG/DL (ref 6–20)
CALCIUM SERPL-MCNC: 9.8 MG/DL (ref 8.6–10)
CHLORIDE SERPL-SCNC: 97 MMOL/L (ref 98–107)
CHOLEST SERPL-MCNC: 182 MG/DL
CREAT SERPL-MCNC: 1.29 MG/DL (ref 0.67–1.17)
CREAT UR-MCNC: 376.3 MG/DL
DEPRECATED HCO3 PLAS-SCNC: 30 MMOL/L (ref 22–29)
ERYTHROCYTE [DISTWIDTH] IN BLOOD BY AUTOMATED COUNT: 12.8 % (ref 10–15)
GFR SERPL CREATININE-BSD FRML MDRD: 66 ML/MIN/1.73M2
GLUCOSE SERPL-MCNC: 203 MG/DL (ref 70–99)
HBA1C MFR BLD: 12.9 % (ref 4–6.2)
HCT VFR BLD AUTO: 43.9 % (ref 40–53)
HDLC SERPL-MCNC: 34 MG/DL
HGB BLD-MCNC: 15.1 G/DL (ref 13.3–17.7)
LDLC SERPL CALC-MCNC: 93 MG/DL
MCH RBC QN AUTO: 32.1 PG (ref 26.5–33)
MCHC RBC AUTO-ENTMCNC: 34.4 G/DL (ref 31.5–36.5)
MCV RBC AUTO: 93 FL (ref 78–100)
MICROALBUMIN UR-MCNC: 754.9 MG/L
MICROALBUMIN/CREAT UR: 200.61 MG/G CR (ref 0–17)
NONHDLC SERPL-MCNC: 148 MG/DL
PLATELET # BLD AUTO: 274 10E3/UL (ref 150–450)
POTASSIUM SERPL-SCNC: 4.9 MMOL/L (ref 3.4–5.3)
PROT SERPL-MCNC: 7.7 G/DL (ref 6.4–8.3)
RBC # BLD AUTO: 4.7 10E6/UL (ref 4.4–5.9)
SODIUM SERPL-SCNC: 135 MMOL/L (ref 136–145)
TRIGL SERPL-MCNC: 274 MG/DL
WBC # BLD AUTO: 10.2 10E3/UL (ref 4–11)

## 2023-08-15 PROCEDURE — G0463 HOSPITAL OUTPT CLINIC VISIT: HCPCS

## 2023-08-15 PROCEDURE — 99214 OFFICE O/P EST MOD 30 MIN: CPT | Performed by: FAMILY MEDICINE

## 2023-08-15 PROCEDURE — 85014 HEMATOCRIT: CPT | Mod: ZL | Performed by: FAMILY MEDICINE

## 2023-08-15 PROCEDURE — 83036 HEMOGLOBIN GLYCOSYLATED A1C: CPT | Mod: ZL | Performed by: FAMILY MEDICINE

## 2023-08-15 PROCEDURE — 80053 COMPREHEN METABOLIC PANEL: CPT | Mod: ZL | Performed by: FAMILY MEDICINE

## 2023-08-15 PROCEDURE — 82570 ASSAY OF URINE CREATININE: CPT | Mod: ZL | Performed by: FAMILY MEDICINE

## 2023-08-15 PROCEDURE — 36415 COLL VENOUS BLD VENIPUNCTURE: CPT | Mod: ZL | Performed by: FAMILY MEDICINE

## 2023-08-15 PROCEDURE — 71046 X-RAY EXAM CHEST 2 VIEWS: CPT

## 2023-08-15 PROCEDURE — 80061 LIPID PANEL: CPT | Mod: ZL | Performed by: FAMILY MEDICINE

## 2023-08-15 RX ORDER — OXYCODONE AND ACETAMINOPHEN 5; 325 MG/1; MG/1
1 TABLET ORAL EVERY 6 HOURS PRN
Qty: 90 TABLET | Refills: 0 | Status: ON HOLD | OUTPATIENT
Start: 2023-08-15 | End: 2024-02-24

## 2023-08-15 RX ORDER — LIRAGLUTIDE 6 MG/ML
0.6 INJECTION SUBCUTANEOUS DAILY
Qty: 9 ML | Refills: 4 | Status: SHIPPED | OUTPATIENT
Start: 2023-08-15 | End: 2023-09-20

## 2023-08-15 RX ORDER — AMLODIPINE BESYLATE 10 MG/1
10 TABLET ORAL DAILY
Qty: 90 TABLET | Refills: 4 | Status: SHIPPED | OUTPATIENT
Start: 2023-08-15

## 2023-08-15 RX ORDER — OXYCODONE AND ACETAMINOPHEN 5; 325 MG/1; MG/1
1 TABLET ORAL EVERY 4 HOURS PRN
Qty: 90 TABLET | Refills: 0 | Status: ON HOLD | OUTPATIENT
Start: 2023-10-10 | End: 2024-02-24

## 2023-08-15 RX ORDER — OXYCODONE AND ACETAMINOPHEN 5; 325 MG/1; MG/1
1 TABLET ORAL EVERY 6 HOURS PRN
Qty: 90 TABLET | Refills: 0 | Status: SHIPPED | OUTPATIENT
Start: 2023-09-12 | End: 2024-01-15

## 2023-08-15 ASSESSMENT — PATIENT HEALTH QUESTIONNAIRE - PHQ9
SUM OF ALL RESPONSES TO PHQ QUESTIONS 1-9: 10
10. IF YOU CHECKED OFF ANY PROBLEMS, HOW DIFFICULT HAVE THESE PROBLEMS MADE IT FOR YOU TO DO YOUR WORK, TAKE CARE OF THINGS AT HOME, OR GET ALONG WITH OTHER PEOPLE: NOT DIFFICULT AT ALL
SUM OF ALL RESPONSES TO PHQ QUESTIONS 1-9: 10

## 2023-08-15 ASSESSMENT — PAIN SCALES - GENERAL: PAINLEVEL: SEVERE PAIN (7)

## 2023-08-15 NOTE — PROGRESS NOTES
Answers submitted by the patient for this visit:  Patient Health Questionnaire (Submitted on 8/15/2023)  If you checked off any problems, how difficult have these problems made it for you to do your work, take care of things at home, or get along with other people?: Not difficult at all  PHQ9 TOTAL SCORE: 10  Diabetes Visit (Submitted on 8/15/2023)  Chief Complaint: Chronic problems general questions HPI Form  Frequency of checking blood sugars:: a few times a month  What time of day are you checking your blood sugars : before meals  Have you had any blood sugars above 200?: Yes  Have you had any blood sugars below 70?: No  Hypoglycemia symptoms:: none  Diabetic concerns:: other  Paraesthesia present:: burning in feet  Have you had a diabetic eye exam within the last year?: Yes  Date of last eye exam: : april  Where was this eye exam done?: [facility]  Back Pain Visit Questionnaire (Submitted on 8/15/2023)  Your back pain is: new  New Back Pain Visit Questionnaire  (Submitted on 8/15/2023)  What do you think is the original cause of your back pain?: other  When did you first notice your back pain? : more than 1 month ago  How would you describe your back pain? : burning, dull ache, sharp  How often do you feel your back pain? : constantly  Where is your back pain located? : right upper back  Where does your back pain spread? : right shoulder  Since you noticed your back pain, how has it changed? : rapidly worsening  Does your back pain interfere with your job?: Yes  On a scale of 1-10 (10 being the worst), how strong is your back pain?: 8  What makes your back pain worse? : certain positions, lying down, sitting, standing, twisting  Acupuncture:: not tried  Acetaminophen: not tried  Activity or Exercise: not helpful  Chiropractor: not tried  Cold: not helpful  Heat: not helpful  Massage: not tried  Muscle relaxants : not tried  NSAIDS (Ibuprofen, Naproxen) : not helpful  Opioids: helpful  Physical Therapy: not  tried  Rest: not helpful  Steroid Injection: not tried  Stretching : not helpful  Surgery: not tried  TENS Unit: not tried  Topical pain relievers : not helpful  Do you see any other healthcare providers for your back pain? : None  General Questionnaire (Submitted on 8/15/2023)  Chief Complaint: Chronic problems general questions HPI Form  What is the reason for your visit today? : cancer check  How many servings of fruits and vegetables do you eat daily?: 2-3  On average, how many sweetened beverages do you drink each day (Examples: soda, juice, sweet tea, etc.  Do NOT count diet or artificially sweetened beverages)?: 0  How many minutes a day do you exercise enough to make your heart beat faster?: 20 to 29  How many days a week do you exercise enough to make your heart beat faster?: 3 or less  How many days per week do you miss taking your medication?: 0

## 2023-08-15 NOTE — NURSING NOTE
"Chief Complaint   Patient presents with    Physical    Shoulder Pain     Right shoulder pain.          Initial BP (!) 149/104 (BP Location: Right arm, Patient Position: Sitting, Cuff Size: Adult Large)   Pulse 64   Temp 98.6  F (37  C) (Tympanic)   Resp 17   Ht 1.905 m (6' 3\")   Wt 138 kg (304 lb 3.2 oz)   SpO2 96%   BMI 38.02 kg/m   Estimated body mass index is 38.02 kg/m  as calculated from the following:    Height as of this encounter: 1.905 m (6' 3\").    Weight as of this encounter: 138 kg (304 lb 3.2 oz).   Advance Care Directive on file?   Advance Care Directive provided to patient?     FOOD SECURITY SCREENING QUESTIONS:    The next two questions are to help us understand your food security.  If you are feeling you need any assistance in this area, we have resources available to support you today.    Hunger Vital Signs:  Within the past 12 months we worried whether our food would run out before we got money to buy more. Never  Within the past 12 months the food we bought just didn't last and we didn't have money to get more. Never        Bella Wade     "

## 2023-08-15 NOTE — PROGRESS NOTES
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 Without abdomen musculature, bowel is very proximal to skin, and also poorly vascularized. I offered a follow up with oncology surgery, but he wants to not do this. Will get a CT to evaluate mass near sigmoid as will as adrenal glands.  reviewed, refilled percocet.   Plan: CT Abdomen Pelvis w/o & w Contrast,         Comprehensive Metabolic Panel, CBC W PLT No         Diff, oxyCODONE-acetaminophen (PERCOCET) 5-325         MG tablet, naloxone (NARCAN) 4 MG/0.1ML nasal         spray, oxyCODONE-acetaminophen (PERCOCET) 5-325        MG tablet, oxyCODONE-acetaminophen (PERCOCET)         5-325 MG tablet, XR Chest 2 Views             (E11.65,  Z79.4) Type 2 diabetes mellitus with hyperglycemia, with long-term current use of insulin (H)  Comment: will add on victoza, and retry the jardiance again.   Plan: FOOT EXAM, Hemoglobin A1c, Albumin Random Urine        Quantitative with Creat Ratio, liraglutide         (VICTOZA) 18 MG/3ML solution, empagliflozin         (JARDIANCE) 10 MG TABS tablet, Lipid Panel             (I10) Essential hypertension  Comment: not near goal. Has had issues with medical compliance in the past. Stressed the need for him to take his pills. Will add on norvasc.   Plan: amLODIPine (NORVASC) 10 MG tablet                      BMI:   Estimated body mass index is 38.02 kg/m  as calculated from the following:    Height as of this encounter: 1.905 m (6' 3\").    Weight as of this encounter: 138 kg (304 lb 3.2 oz).   Weight management plan: Discussed healthy diet and exercise guidelines        Return in about 3 months (around 11/15/2023).    Mik Kumari MD  [facility]    Ariane Giraldo is a 53 year old, presenting for the following health issues:  Physical " and Shoulder Pain (Right shoulder pain. )      History of Present Illness       Back Pain:  He presents for follow up of back pain. Patient's back pain is a new problem.    Original cause of back pain: other  First noticed back pain: more than 1 month ago  Patient feels back pain: constantlyLocation of back pain:  Right upper back  Description of back pain: burning, dull ache and sharp  Back pain spreads: right shoulder    Since patient first noticed back pain, pain is: rapidly worsening  Does back pain interfere with his job:  Yes  On a scale of 1-10 (10 being the worst), patient describes pain as:  8  What makes back pain worse: certain positions, lying down, sitting, standing and twisting   Acupuncture: not tried  Acetaminophen: not tried  Activity or exercise: not helpful  Chiropractor:  Not tried  Cold: not helpful  Heat: not helpful  Massage: not tried  Muscle relaxants: not tried  NSAIDS: not helpful  Opioids: helpful  Physical Therapy: not tried  Rest: not helpful  Steroid Injection: not tried  Stretching: not helpful  Surgery: not tried  TENS unit: not tried  Topical pain relievers: not helpful  Other healthcare providers patient is seeing for back pain: None    Diabetes:   He presents for follow up of diabetes.  He is checking home blood glucose a few times a month.   He checks blood glucose before meals.  Blood glucose is sometimes over 200 and never under 70. He is aware of hypoglycemia symptoms including none.   He is concerned about other.   He is having burning in feet.  The patient has had a diabetic eye exam in the last 12 months. Eye exam performed on april[facility]        Reason for visit:  Cancer check    He eats 2-3 servings of fruits and vegetables daily.He consumes 0 sweetened beverage(s) daily.He exercises with enough effort to increase his heart rate 20 to 29 minutes per day.  He exercises with enough effort to increase his heart rate 3 or less days per week.   He is  taking medications regularly.     He has had appendix cancer with significant ongoing pain form the several surgeries. 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 He has had right shoulder pain that he suspects is referred from the right upper oblique area. Hernia is getting larger, with some cutaneous lesions/ulcers. Using the tramadol, oxycodone or alleve every am to get out of bed. With deep inspiration he has right chest wall pains too. Is out of his opiates. Last CT was 1 year ago. Had been given jardiance in the past, 2 years ago, not covered by insurance then. Willing to repeat the prescription. Pain in right shoulder worse with twisting movements mostly.     Is using his insulin but not checking his glucose.     Recent Labs   Lab Test 03/30/23  1546 10/26/22  1022 07/14/22  1542 05/26/22  0928 05/26/22  0928 06/25/21  1046 12/17/20  1342 08/14/20  1332   A1C 11.0* 10.2*  --   --  12.3* 13.4* 13.5* 12.0*   LDL  --   --  158*  --   --  159*  --  Cannot estimate LDL when triglyceride exceeds 400 mg/dL   HDL  --   --  36  --   --  34  --  29   TRIG  --   --  234*  --   --  297*  --  405*   ALT 28  --  21  --  25  --  34  --    CR 0.78  --  1.02   < > 0.91 1.07 0.97 1.05   GFRESTIMATED >90  --  89   < > >90 73 82 75   GFRESTBLACK  --   --   --   --   --  89 >90 >90   POTASSIUM 4.3  --  4.3   < > 4.5 3.7 4.5 4.2   TSH  --  0.58 0.87  --   --   --   --   --     < > = values in this interval not displayed.          Current Outpatient Medications   Medication    aspirin (ASA) 81 MG chewable tablet    Blood Pressure KIT    carvedilol (COREG) 25 MG tablet    Insulin Aspart FlexPen 100 UNIT/ML SOPN    insulin pen needle (BD VELIA U/F) 32G X 4 MM miscellaneous    lisinopril (ZESTRIL) 40 MG tablet    oxyCODONE-acetaminophen (PERCOCET) 5-325 MG tablet    oxyCODONE-acetaminophen (PERCOCET) 5-325 MG tablet    oxyCODONE-acetaminophen (PERCOCET) 5-325 MG tablet    rosuvastatin (CRESTOR) 20 MG tablet     "terazosin (HYTRIN) 2 MG capsule    traZODone (DESYREL) 50 MG tablet    empagliflozin (JARDIANCE) 25 MG TABS tablet    furosemide (LASIX) 40 MG tablet    LANTUS SOLOSTAR 100 UNIT/ML soln    sildenafil (VIAGRA) 100 MG tablet    venlafaxine (EFFEXOR XR) 150 MG 24 hr capsule    venlafaxine (EFFEXOR XR) 75 MG 24 hr capsule     No current facility-administered medications for this visit.               Review of Systems         Objective    BP (!) 149/104 (BP Location: Right arm, Patient Position: Sitting, Cuff Size: Adult Large)   Pulse 64   Temp 98.6  F (37  C) (Tympanic)   Resp 17   Ht 1.905 m (6' 3\")   Wt 138 kg (304 lb 3.2 oz)   SpO2 96%   BMI 38.02 kg/m    Body mass index is 38.02 kg/m .  Physical Exam  Constitutional:       Appearance: Normal appearance.   Cardiovascular:      Rate and Rhythm: Normal rate and regular rhythm.   Pulmonary:      Effort: Pulmonary effort is normal. No respiratory distress.      Breath sounds: No stridor.   Abdominal:      Comments: Severe ventral hernia, with 4 ulcers on skin surface. Each perhaps 1-2 cm.    Musculoskeletal:      Comments: Pain on palpation along left medial scapula area.    Neurological:      Mental Status: He is alert.            Sensory exam of the foot is normal, tested with the monofilament. Good pulses, no lesions or ulcers, good peripheral pulses.    Results for orders placed or performed during the hospital encounter of 08/15/23   XR Chest 2 Views     Status: None    Narrative    Exam:  XR CHEST 2 VIEWS    HISTORY: Malignant neoplasm of appendix vermiformis (H).    COMPARISON:  12/3/2019    FINDINGS:     The cardiomediastinal contours are normal.      No focal consolidation, effusion, or pneumothorax.  Unchanged  calcified granuloma in the right upper lung.    No acute osseous abnormality.       Impression    IMPRESSION:      No acute cardiopulmonary process.      JAYE MCCRAY MD         SYSTEM ID:  [id number]   Results for orders placed or performed " in visit on 08/15/23   Hemoglobin A1c     Status: Abnormal   Result Value Ref Range    Hemoglobin A1C 12.9 (H) 4.0 - 6.2 %   Albumin Random Urine Quantitative with Creat Ratio     Status: Abnormal   Result Value Ref Range    Creatinine Urine mg/dL 376.3 mg/dL    Albumin Urine mg/L 754.9 mg/L    Albumin Urine mg/g Cr 200.61 (H) 0.00 - 17.00 mg/g Cr   Comprehensive Metabolic Panel     Status: Abnormal   Result Value Ref Range    Sodium 135 (L) 136 - 145 mmol/L    Potassium 4.9 3.4 - 5.3 mmol/L    Chloride 97 (L) 98 - 107 mmol/L    Carbon Dioxide (CO2) 30 (H) 22 - 29 mmol/L    Anion Gap 8 7 - 15 mmol/L    Urea Nitrogen 15.1 6.0 - 20.0 mg/dL    Creatinine 1.29 (H) 0.67 - 1.17 mg/dL    Calcium 9.8 8.6 - 10.0 mg/dL    Glucose 203 (H) 70 - 99 mg/dL    Alkaline Phosphatase 142 (H) 40 - 129 U/L    AST 35 0 - 45 U/L    ALT 36 0 - 70 U/L    Protein Total 7.7 6.4 - 8.3 g/dL    Albumin 4.1 3.5 - 5.2 g/dL    Bilirubin Total 0.4 <=1.2 mg/dL    GFR Estimate 66 >60 mL/min/1.73m2   CBC W PLT No Diff     Status: Normal   Result Value Ref Range    WBC Count 10.2 4.0 - 11.0 10e3/uL    RBC Count 4.70 4.40 - 5.90 10e6/uL    Hemoglobin 15.1 13.3 - 17.7 g/dL    Hematocrit 43.9 40.0 - 53.0 %    MCV 93 78 - 100 fL    MCH 32.1 26.5 - 33.0 pg    MCHC 34.4 31.5 - 36.5 g/dL    RDW 12.8 10.0 - 15.0 %    Platelet Count 274 150 - 450 10e3/uL   Lipid Panel     Status: Abnormal   Result Value Ref Range    Cholesterol 182 <200 mg/dL    Triglycerides 274 (H) <150 mg/dL    Direct Measure HDL 34 (L) >=40 mg/dL    LDL Cholesterol Calculated 93 <=100 mg/dL    Non HDL Cholesterol 148 (H) <130 mg/dL    Narrative    Cholesterol  Desirable:  <200 mg/dL    Triglycerides  Normal:  Less than 150 mg/dL  Borderline High:  150-199 mg/dL  High:  200-499 mg/dL  Very High:  Greater than or equal to 500 mg/dL    Direct Measure HDL  Female:  Greater than or equal to 50 mg/dL   Male:  Greater than or equal to 40 mg/dL    LDL Cholesterol  Desirable:  <100mg/dL  Above  Desirable:  100-129 mg/dL   Borderline High:  130-159 mg/dL   High:  160-189 mg/dL   Very High:  >= 190 mg/dL    Non HDL Cholesterol  Desirable:  130 mg/dL  Above Desirable:  130-159 mg/dL  Borderline High:  160-189 mg/dL  High:  190-219 mg/dL  Very High:  Greater than or equal to 220 mg/dL

## 2023-08-25 ENCOUNTER — HOSPITAL ENCOUNTER (OUTPATIENT)
Dept: CT IMAGING | Facility: OTHER | Age: 53
Discharge: HOME | End: 2023-08-25
Attending: FAMILY MEDICINE | Admitting: FAMILY MEDICINE
Payer: COMMERCIAL

## 2023-08-25 DIAGNOSIS — C18.1 MALIGNANT NEOPLASM OF APPENDIX VERMIFORMIS (H): ICD-10-CM

## 2023-08-25 PROCEDURE — 74177 CT ABD & PELVIS W/CONTRAST: CPT

## 2023-08-25 PROCEDURE — 250N000011 HC RX IP 250 OP 636: Performed by: FAMILY MEDICINE

## 2023-08-25 RX ORDER — IOPAMIDOL 755 MG/ML
150 INJECTION, SOLUTION INTRAVASCULAR ONCE
Status: COMPLETED | OUTPATIENT
Start: 2023-08-25 | End: 2023-08-25

## 2023-08-25 RX ADMIN — IOPAMIDOL 150 ML: 755 INJECTION, SOLUTION INTRAVENOUS at 15:43

## 2023-08-25 NOTE — PROGRESS NOTES

## 2023-09-20 DIAGNOSIS — Z79.4 TYPE 2 DIABETES MELLITUS WITH HYPERGLYCEMIA, WITH LONG-TERM CURRENT USE OF INSULIN (H): ICD-10-CM

## 2023-09-20 DIAGNOSIS — E11.65 TYPE 2 DIABETES MELLITUS WITH HYPERGLYCEMIA, WITH LONG-TERM CURRENT USE OF INSULIN (H): ICD-10-CM

## 2023-09-20 RX ORDER — LIRAGLUTIDE 6 MG/ML
0.6 INJECTION SUBCUTANEOUS DAILY
Qty: 9 ML | Refills: 4 | Status: SHIPPED | OUTPATIENT
Start: 2023-09-20 | End: 2023-11-05

## 2023-09-22 DIAGNOSIS — I10 HTN (HYPERTENSION), MALIGNANT: ICD-10-CM

## 2023-09-22 DIAGNOSIS — E78.2 MIXED HYPERLIPIDEMIA: ICD-10-CM

## 2023-09-29 RX ORDER — ROSUVASTATIN CALCIUM 20 MG/1
20 TABLET, COATED ORAL DAILY
Qty: 90 TABLET | Refills: 3 | Status: ON HOLD | OUTPATIENT
Start: 2023-09-29 | End: 2024-02-24

## 2023-09-29 RX ORDER — LISINOPRIL 40 MG/1
40 TABLET ORAL DAILY
Qty: 90 TABLET | Refills: 3 | Status: SHIPPED | OUTPATIENT
Start: 2023-09-29

## 2023-09-29 NOTE — TELEPHONE ENCOUNTER
"Requested Prescriptions   Pending Prescriptions Disp Refills    lisinopril (ZESTRIL) 40 MG tablet [Pharmacy Med Name: LISINOPRIL 40MG TABLETS] 90 tablet 3     Sig: TAKE 1 TABLET(40 MG) BY MOUTH DAILY       ACE Inhibitors (Including Combos) Protocol Failed - 9/22/2023  3:57 AM        Failed - Blood pressure under 140/90 in past 12 months     BP Readings from Last 3 Encounters:   08/15/23 (!) 149/104   03/30/23 132/82   10/26/22 136/76                 Failed - Normal serum creatinine on file in past 12 months     Recent Labs   Lab Test 08/15/23  1644   CR 1.29*       Ok to refill medication if creatinine is low          Passed - Recent (12 mo) or future (30 days) visit within the authorizing provider's specialty     Patient has had an office visit with the authorizing provider or a provider within the authorizing providers department within the previous 12 mos or has a future within next 30 days. See \"Patient Info\" tab in inbasket, or \"Choose Columns\" in Meds & Orders section of the refill encounter.              Passed - Medication is active on med list        Passed - Patient is age 18 or older        Passed - Normal serum potassium on file in past 12 months     Recent Labs   Lab Test 08/15/23  1644   POTASSIUM 4.9               rosuvastatin (CRESTOR) 20 MG tablet [Pharmacy Med Name: ROSUVASTATIN 20MG TABLETS] 90 tablet 3     Sig: TAKE 1 TABLET(20 MG) BY MOUTH DAILY       Statins Protocol Passed - 9/22/2023  3:57 AM        Passed - LDL on file in past 12 months     Recent Labs   Lab Test 08/15/23  1644   LDL 93             Passed - No abnormal creatine kinase in past 12 months     Recent Labs   Lab Test 07/14/22  1542   CKT 44                Passed - Recent (12 mo) or future (30 days) visit within the authorizing provider's specialty     Patient has had an office visit with the authorizing provider or a provider within the authorizing providers department within the previous 12 mos or has a future within next 30 " "days. See \"Patient Info\" tab in inbasket, or \"Choose Columns\" in Meds & Orders section of the refill encounter.              Passed - Medication is active on med list        Passed - Patient is age 18 or older              LOV: 8/15/2023  Future Office visit: No future appointment scheduled at this time.      Routing refill request to provider for review/approval.    Irene Hughes RN  ....................  9/29/2023   11:09 AM    "

## 2023-10-03 DIAGNOSIS — C18.1 MALIGNANT NEOPLASM OF APPENDIX VERMIFORMIS (H): Primary | ICD-10-CM

## 2023-10-03 RX ORDER — CYCLOBENZAPRINE HCL 10 MG
10 TABLET ORAL 3 TIMES DAILY PRN
Qty: 60 TABLET | Refills: 4 | Status: SHIPPED | OUTPATIENT
Start: 2023-10-03 | End: 2024-09-23

## 2023-10-17 DIAGNOSIS — I10 HTN (HYPERTENSION), MALIGNANT: ICD-10-CM

## 2023-10-17 RX ORDER — CARVEDILOL 25 MG/1
TABLET ORAL
Qty: 180 TABLET | Refills: 0 | Status: ON HOLD | OUTPATIENT
Start: 2023-10-17 | End: 2024-02-23

## 2023-10-23 DIAGNOSIS — N41.0 ACUTE PROSTATITIS: ICD-10-CM

## 2023-10-23 DIAGNOSIS — E11.9 DIABETES MELLITUS WITHOUT COMPLICATION (H): ICD-10-CM

## 2023-10-25 NOTE — TELEPHONE ENCOUNTER
Insulin Aspart Flexpen Inj:    Pharmacy note: This is a new RX request. This patient does not have enough quantity remaining to fill this order. Please provide a new RX with directions, quantity, and refills. The pack size is 15.  Thank you.

## 2023-10-27 RX ORDER — INSULIN ASPART 100 [IU]/ML
20 INJECTION, SOLUTION INTRAVENOUS; SUBCUTANEOUS
Qty: 45 ML | Refills: 0 | Status: SHIPPED | OUTPATIENT
Start: 2023-10-27 | End: 2024-01-02

## 2023-10-27 RX ORDER — DOXYCYCLINE 100 MG/1
CAPSULE ORAL
Qty: 42 CAPSULE | Refills: 0 | OUTPATIENT
Start: 2023-10-27

## 2023-10-30 ENCOUNTER — MYC REFILL (OUTPATIENT)
Dept: FAMILY MEDICINE | Facility: OTHER | Age: 53
End: 2023-10-30

## 2023-10-30 ENCOUNTER — MYC MEDICAL ADVICE (OUTPATIENT)
Dept: FAMILY MEDICINE | Facility: OTHER | Age: 53
End: 2023-10-30

## 2023-10-30 DIAGNOSIS — I10 HTN (HYPERTENSION), MALIGNANT: ICD-10-CM

## 2023-10-31 RX ORDER — TERAZOSIN 2 MG/1
2 CAPSULE ORAL AT BEDTIME
Qty: 90 CAPSULE | Refills: 3 | Status: ON HOLD | OUTPATIENT
Start: 2023-10-31 | End: 2024-02-24

## 2023-11-02 NOTE — TELEPHONE ENCOUNTER
Called [facility].      Double booked into 11/9 9:20am slot.     [facility]    Lona Sabillon RN on 11/2/2023 at 8:26 AM

## 2023-11-05 ENCOUNTER — MYC REFILL (OUTPATIENT)
Dept: FAMILY MEDICINE | Facility: OTHER | Age: 53
End: 2023-11-05
Payer: COMMERCIAL

## 2023-11-05 DIAGNOSIS — E11.65 TYPE 2 DIABETES MELLITUS WITH HYPERGLYCEMIA, WITH LONG-TERM CURRENT USE OF INSULIN (H): ICD-10-CM

## 2023-11-05 DIAGNOSIS — Z79.4 TYPE 2 DIABETES MELLITUS WITH HYPERGLYCEMIA, WITH LONG-TERM CURRENT USE OF INSULIN (H): ICD-10-CM

## 2023-11-07 RX ORDER — LIRAGLUTIDE 6 MG/ML
0.6 INJECTION SUBCUTANEOUS DAILY
Qty: 9 ML | Refills: 4 | Status: SHIPPED | OUTPATIENT
Start: 2023-11-07 | End: 2024-09-23

## 2023-11-10 NOTE — TELEPHONE ENCOUNTER
Called [facility] to schedule appt on 11/20 at 9:40am.    [facility]    Lona Sabillon RN on 11/10/2023 at 9:02 AM

## 2023-11-14 DIAGNOSIS — F33.1 MAJOR DEPRESSIVE DISORDER, RECURRENT EPISODE, MODERATE (H): ICD-10-CM

## 2023-11-14 NOTE — TELEPHONE ENCOUNTER
Message from pharmacy:  (Patient) would like a refill of venlafaxine. It was closed by prescriber, but patient has been taking. Please send rx if appropriate. Thanks.    Qiana Kay RN .............. 11/14/2023  10:33 AM

## 2023-11-16 RX ORDER — VENLAFAXINE HYDROCHLORIDE 150 MG/1
150 CAPSULE, EXTENDED RELEASE ORAL DAILY
Qty: 90 CAPSULE | Refills: 4 | Status: SHIPPED | OUTPATIENT
Start: 2023-11-16

## 2023-11-16 NOTE — TELEPHONE ENCOUNTER
Martha sent Rx request for the following:      Requested Prescriptions   Pending Prescriptions Disp Refills    venlafaxine (EFFEXOR XR) 150 MG 24 hr capsule 90 capsule 4     Sig: Take 1 capsule (150 mg) by mouth daily       Serotonin-Norepinephrine Reuptake Inhibitors  Failed - 11/14/2023 10:33 AM     Last Prescription Date:   3/30/23  Last Fill Qty/Refills:         90, R-4    Last Office Visit:              8/15/23   Future Office visit:           11/20/23    Discontinued on 8/15/23- patient requesting this be filled.  Emilia Mclain RN on 11/16/2023 at 1:51 PM

## 2023-11-20 ENCOUNTER — OFFICE VISIT (OUTPATIENT)
Dept: FAMILY MEDICINE | Facility: OTHER | Age: 53
End: 2023-11-20
Attending: FAMILY MEDICINE
Payer: COMMERCIAL

## 2023-11-20 VITALS
WEIGHT: 303.6 LBS | BODY MASS INDEX: 37.95 KG/M2 | RESPIRATION RATE: 18 BRPM | TEMPERATURE: 97.7 F | DIASTOLIC BLOOD PRESSURE: 76 MMHG | HEART RATE: 61 BPM | OXYGEN SATURATION: 95 % | SYSTOLIC BLOOD PRESSURE: 118 MMHG

## 2023-11-20 DIAGNOSIS — B02.9 HERPES ZOSTER WITHOUT COMPLICATION: ICD-10-CM

## 2023-11-20 DIAGNOSIS — M75.101 ROTATOR CUFF SYNDROME, RIGHT: Primary | ICD-10-CM

## 2023-11-20 DIAGNOSIS — I50.32 CHRONIC DIASTOLIC HEART FAILURE (H): ICD-10-CM

## 2023-11-20 PROCEDURE — 250N000009 HC RX 250: Performed by: FAMILY MEDICINE

## 2023-11-20 PROCEDURE — 250N000011 HC RX IP 250 OP 636: Mod: JZ | Performed by: FAMILY MEDICINE

## 2023-11-20 PROCEDURE — G0463 HOSPITAL OUTPT CLINIC VISIT: HCPCS | Mod: 25

## 2023-11-20 PROCEDURE — G0008 ADMIN INFLUENZA VIRUS VAC: HCPCS

## 2023-11-20 PROCEDURE — 20610 DRAIN/INJ JOINT/BURSA W/O US: CPT | Performed by: FAMILY MEDICINE

## 2023-11-20 RX ORDER — METHYLPREDNISOLONE ACETATE 40 MG/ML
40 INJECTION, SUSPENSION INTRA-ARTICULAR; INTRALESIONAL; INTRAMUSCULAR; SOFT TISSUE ONCE
Status: COMPLETED | OUTPATIENT
Start: 2023-11-20 | End: 2023-11-20

## 2023-11-20 RX ADMIN — METHYLPREDNISOLONE ACETATE 40 MG: 40 INJECTION, SUSPENSION INTRA-ARTICULAR; INTRALESIONAL; INTRAMUSCULAR; INTRASYNOVIAL; SOFT TISSUE at 10:39

## 2023-11-20 RX ADMIN — LIDOCAINE HYDROCHLORIDE 2 ML: 10 INJECTION, SOLUTION INFILTRATION; PERINEURAL at 10:40

## 2023-11-20 ASSESSMENT — PAIN SCALES - GENERAL: PAINLEVEL: SEVERE PAIN (7)

## 2023-11-20 ASSESSMENT — PATIENT HEALTH QUESTIONNAIRE - PHQ9
SUM OF ALL RESPONSES TO PHQ QUESTIONS 1-9: 4
SUM OF ALL RESPONSES TO PHQ QUESTIONS 1-9: 4
10. IF YOU CHECKED OFF ANY PROBLEMS, HOW DIFFICULT HAVE THESE PROBLEMS MADE IT FOR YOU TO DO YOUR WORK, TAKE CARE OF THINGS AT HOME, OR GET ALONG WITH OTHER PEOPLE: NOT DIFFICULT AT ALL

## 2023-11-20 NOTE — NURSING NOTE
"Chief Complaint   Patient presents with    Musculoskeletal Problem     Right shoulder pain       Initial /76   Pulse 61   Temp 97.7  F (36.5  C) (Tympanic)   Resp 18   Wt 137.7 kg (303 lb 9.6 oz)   SpO2 95%   BMI 37.95 kg/m   Estimated body mass index is 37.95 kg/m  as calculated from the following:    Height as of 8/15/23: 1.905 m (6' 3\").    Weight as of this encounter: 137.7 kg (303 lb 9.6 oz).  Medication Reconciliation: complete      "

## 2023-11-20 NOTE — PROGRESS NOTES
Assessment & Plan     (M75.101) Rotator cuff syndrome, right  (primary encounter diagnosis)  Comment: exam is reassuring from a complete tear. He is leaving for 2 months in [place] tomorrow. Will therefore do an injection today. Can do ice and NSAIDS while in [place].   Plan: methylPREDNISolone (DEPO-Medrol) injection 40         mg, Large Joint/Bursa injection and/or drainage        (Shoulder, Knee), lidocaine 1 % 2 mL                 (B02.9) Herpes zoster without complication  Comment: this is mild and it is too late for antiviral meds. Supportive cares at this point.   Plan:                   No follow-ups on file.    Mik Kumari MD  [facility]   Enzo is a 53 year old, presenting for the following health issues:  Musculoskeletal Problem (Right shoulder pain)      11/20/2023     9:48 AM   Additional Questions   Roomed by MELLY Dang   Accompanied by Self         11/20/2023     9:48 AM   Patient Reported Additional Medications   Patient reports taking the following new medications Aleve 200 MG BID       History of Present Illness       Back Pain:  He presents for follow up of back pain. Patient's back pain is a recurring problem.  Location of back pain:  Right lower back, left lower back, right middle of back and left middle of back  Description of back pain: burning, cramping and dull ache  Back pain spreads: nowhere    Since patient first noticed back pain, pain is: gradually worsening  Does back pain interfere with his job:  Yes       Reason for visit:  Pain    He eats 2-3 servings of fruits and vegetables daily.He consumes 0 sweetened beverage(s) daily.He exercises with enough effort to increase his heart rate 10 to 19 minutes per day.  He exercises with enough effort to increase his heart rate 3 or less days per week.   He is taking medications regularly.     Pain in right shoulder with abduction mostly. Radiates into the distal deltoid with abduction.  Symptoms for a year  no known injuries. Hun[place] and falls in the woods form time to time.     Now has a rash in the luq. Sensitive to touch. Symptoms for 6-7 days. Using topical benadryl.              Review of Systems         Objective    /76   Pulse 61   Temp 97.7  F (36.5  C) (Tympanic)   Resp 18   Wt 137.7 kg (303 lb 9.6 oz)   SpO2 95%   BMI 37.95 kg/m    Body mass index is 37.95 kg/m .  Physical Exam  Musculoskeletal:      Comments: Right shoulder with full range of motion but slow to get to extreme of abduction. Mild pain with impingement testing. No pain on palpation. Discussed with him risks of injections and he consented. Prepped and infiltrated with 2 ml 1% lidocaine and 40 milligram depot medrol in sub acromial anterior lateral approach, tolerated well.   Skin:     Comments: Linear erythematous vesicular rash in the LUQ area.

## 2024-01-01 NOTE — TELEPHONE ENCOUNTER
Requested Prescriptions   Pending Prescriptions Disp Refills     furosemide (LASIX) 40 MG tablet 90 tablet 0     Sig: Take 1 tablet (40 mg) by mouth daily       Diuretics (Including Combos) Protocol Failed - 6/16/2023 12:43 PM        Failed - Normal serum sodium on file in past 12 months     Recent Labs   Lab Test 03/30/23  1546   *        Last Prescription Date:   4/25/23  Last Fill Qty/Refills:         90, R-0    Last Office Visit:              3/30/23   Future Office visit:           None    Per LOV note: Return in about 3 months (around 6/30/2023) for Follow up, with me.    Pt due for follow up soon. Routing to provider for refill consideration. Routing to Unit scheduling pool, to assist Pt in scheduling appointment.     Qiana Kay RN .............. 6/16/2023  12:44 PM  
Patient is due for his 3 month follow-up at the end of June.  PCP next available appointment is 08/18/23.   Patient is asking to get worked into schedule.     Please call patient back thank you   Miya Oliveira on 6/20/2023 at 10:32 AM    
- - -

## 2024-01-02 DIAGNOSIS — E11.9 DIABETES MELLITUS WITHOUT COMPLICATION (H): ICD-10-CM

## 2024-01-04 RX ORDER — INSULIN ASPART 100 [IU]/ML
20 INJECTION, SOLUTION INTRAVENOUS; SUBCUTANEOUS
Qty: 45 ML | Refills: 0 | Status: SHIPPED | OUTPATIENT
Start: 2024-01-04 | End: 2024-06-18

## 2024-01-04 NOTE — TELEPHONE ENCOUNTER
[id number] if [facility] sent Rx request for the following:      Requested Prescriptions   Pending Prescriptions Disp Refills    Insulin Aspart FlexPen 100 UNIT/ML SOPN 45 mL 0     Sig: Inject 20 Units Subcutaneous 3 times daily (with meals)       Short Acting Insulin Protocol Failed - 1/4/2024  8:56 AM        Failed - HgbA1C in past 3 or 6 months     If HgbA1C is 8 or greater, it needs to be on file within the past 3 months.  If less than 8, must be on file within the past 6 months.     Recent Labs   Lab Test 08/15/23  1644 05/30/18  1110 09/11/17  1213   A1C 12.9*   < >  --    [id number]  --   --  9.1*    < > = values in this interval not displayed.        Last Prescription Date:   10/27/23  Last Fill Qty/Refills:         45 ml, R-0    Last Office Visit:              11/20/23   Future Office visit:           None    Unable to complete prescription refill per RN Medication Refill Policy.     Routing to covering provider for refill consideration, as PCP/provider is out of clinic >48 hours or Pt is completely out of medication and provider is out of the clinic today.    Qiana Kay RN .............. 1/4/2024  8:57 AM

## 2024-01-15 ENCOUNTER — MYC REFILL (OUTPATIENT)
Dept: FAMILY MEDICINE | Facility: OTHER | Age: 54
End: 2024-01-15
Payer: COMMERCIAL

## 2024-01-15 DIAGNOSIS — C18.1 MALIGNANT NEOPLASM OF APPENDIX VERMIFORMIS (H): ICD-10-CM

## 2024-01-17 NOTE — TELEPHONE ENCOUNTER
Requested Prescriptions   Pending Prescriptions Disp Refills    oxyCODONE-acetaminophen (PERCOCET) 5-325 MG tablet 90 tablet 0     Sig: Take 1 tablet by mouth every 6 hours as needed for pain       There is no refill protocol information for this order        Last Prescription as requested:   9/12/23  Last Fill Qty/Refills:         90, R-0      Last prescription for this medication: Disp Refills Start End JESÚS   oxyCODONE-acetaminophen (PERCOCET) 5-325 MG tablet 90 tablet 0 10/10/2023 -- No   Sig - Route: Take 1 tablet by mouth every 4 hours as needed for pain - Oral     Last Office Visit:              11/20/23   Future Office visit:           None    Unable to complete prescription refill per RN Medication Refill Policy.     Qiana Kay RN .............. 1/17/2024  3:01 PM

## 2024-01-18 RX ORDER — OXYCODONE AND ACETAMINOPHEN 5; 325 MG/1; MG/1
1 TABLET ORAL EVERY 6 HOURS PRN
Qty: 90 TABLET | Refills: 0 | Status: ON HOLD | OUTPATIENT
Start: 2024-01-18 | End: 2024-02-24

## 2024-02-20 ENCOUNTER — APPOINTMENT (OUTPATIENT)
Dept: GENERAL RADIOLOGY | Facility: OTHER | Age: 54
End: 2024-02-20
Payer: COMMERCIAL

## 2024-02-20 ENCOUNTER — HOSPITAL ENCOUNTER (EMERGENCY)
Facility: OTHER | Age: 54
Discharge: HOME | End: 2024-02-21
Payer: COMMERCIAL

## 2024-02-20 ENCOUNTER — APPOINTMENT (OUTPATIENT)
Dept: CT IMAGING | Facility: OTHER | Age: 54
End: 2024-02-20
Payer: COMMERCIAL

## 2024-02-20 DIAGNOSIS — R50.9 FEVER: ICD-10-CM

## 2024-02-20 DIAGNOSIS — R74.8 ELEVATED LIVER ENZYMES: ICD-10-CM

## 2024-02-20 DIAGNOSIS — Q89.01 ASPLENIA: Primary | ICD-10-CM

## 2024-02-20 DIAGNOSIS — R79.89 ELEVATED SERUM CREATININE: ICD-10-CM

## 2024-02-20 DIAGNOSIS — E87.1 HYPONATREMIA: ICD-10-CM

## 2024-02-20 DIAGNOSIS — R10.9 ABDOMINAL PAIN: ICD-10-CM

## 2024-02-20 LAB
ALBUMIN SERPL BCG-MCNC: 4.1 G/DL (ref 3.5–5.2)
ALP SERPL-CCNC: 155 U/L (ref 40–150)
ALT SERPL W P-5'-P-CCNC: 447 U/L (ref 0–70)
ANION GAP SERPL CALCULATED.3IONS-SCNC: 11 MMOL/L (ref 7–15)
AST SERPL W P-5'-P-CCNC: 473 U/L (ref 0–45)
BASOPHILS # BLD AUTO: 0 10E3/UL (ref 0–0.2)
BASOPHILS NFR BLD AUTO: 0 %
BILIRUB SERPL-MCNC: 0.3 MG/DL
BUN SERPL-MCNC: 19.4 MG/DL (ref 6–20)
CALCIUM SERPL-MCNC: 9.8 MG/DL (ref 8.6–10)
CHLORIDE SERPL-SCNC: 92 MMOL/L (ref 98–107)
CREAT SERPL-MCNC: 1.31 MG/DL (ref 0.67–1.17)
DEPRECATED HCO3 PLAS-SCNC: 26 MMOL/L (ref 22–29)
EGFRCR SERPLBLD CKD-EPI 2021: 65 ML/MIN/1.73M2
EOSINOPHIL # BLD AUTO: 0.5 10E3/UL (ref 0–0.7)
EOSINOPHIL NFR BLD AUTO: 8 %
ERYTHROCYTE [DISTWIDTH] IN BLOOD BY AUTOMATED COUNT: 13.2 % (ref 10–15)
FLUAV RNA SPEC QL NAA+PROBE: NEGATIVE
FLUBV RNA RESP QL NAA+PROBE: NEGATIVE
GLUCOSE SERPL-MCNC: 260 MG/DL (ref 70–99)
HCT VFR BLD AUTO: 46 % (ref 40–53)
HGB BLD-MCNC: 16.4 G/DL (ref 13.3–17.7)
HOLD SPECIMEN: NORMAL
HOLD SPECIMEN: NORMAL
IMM GRANULOCYTES # BLD: 0 10E3/UL
IMM GRANULOCYTES NFR BLD: 0 %
LACTATE SERPL-SCNC: 2 MMOL/L (ref 0.7–2)
LIPASE SERPL-CCNC: 22 U/L (ref 13–60)
LYMPHOCYTES # BLD AUTO: 0.8 10E3/UL (ref 0.8–5.3)
LYMPHOCYTES NFR BLD AUTO: 12 %
MCH RBC QN AUTO: 32 PG (ref 26.5–33)
MCHC RBC AUTO-ENTMCNC: 35.7 G/DL (ref 31.5–36.5)
MCV RBC AUTO: 90 FL (ref 78–100)
MONOCYTES # BLD AUTO: 0.5 10E3/UL (ref 0–1.3)
MONOCYTES NFR BLD AUTO: 8 %
MONOCYTES NFR BLD AUTO: NEGATIVE %
NEUTROPHILS # BLD AUTO: 4.5 10E3/UL (ref 1.6–8.3)
NEUTROPHILS NFR BLD AUTO: 72 %
NRBC # BLD AUTO: 0 10E3/UL
NRBC BLD AUTO-RTO: 0 /100
PLATELET # BLD AUTO: 246 10E3/UL (ref 150–450)
POTASSIUM SERPL-SCNC: 3.8 MMOL/L (ref 3.4–5.3)
PROT SERPL-MCNC: 7.8 G/DL (ref 6.4–8.3)
RBC # BLD AUTO: 5.12 10E6/UL (ref 4.4–5.9)
RSV RNA SPEC NAA+PROBE: NEGATIVE
SARS-COV-2 RNA RESP QL NAA+PROBE: NEGATIVE
SODIUM SERPL-SCNC: 129 MMOL/L (ref 135–145)
WBC # BLD AUTO: 6.3 10E3/UL (ref 4–11)

## 2024-02-20 PROCEDURE — 250N000011 HC RX IP 250 OP 636

## 2024-02-20 PROCEDURE — 74174 CTA ABD&PLVS W/CONTRAST: CPT | Mod: TC

## 2024-02-20 PROCEDURE — 82040 ASSAY OF SERUM ALBUMIN: CPT | Performed by: FAMILY MEDICINE

## 2024-02-20 PROCEDURE — 83690 ASSAY OF LIPASE: CPT | Performed by: FAMILY MEDICINE

## 2024-02-20 PROCEDURE — 99285 EMERGENCY DEPT VISIT HI MDM: CPT | Mod: 25

## 2024-02-20 PROCEDURE — 36415 COLL VENOUS BLD VENIPUNCTURE: CPT

## 2024-02-20 PROCEDURE — 85025 COMPLETE CBC W/AUTO DIFF WBC: CPT | Performed by: FAMILY MEDICINE

## 2024-02-20 PROCEDURE — 87637 SARSCOV2&INF A&B&RSV AMP PRB: CPT

## 2024-02-20 PROCEDURE — 71046 X-RAY EXAM CHEST 2 VIEWS: CPT | Mod: TC

## 2024-02-20 PROCEDURE — 36415 COLL VENOUS BLD VENIPUNCTURE: CPT | Performed by: FAMILY MEDICINE

## 2024-02-20 PROCEDURE — 250N000013 HC RX MED GY IP 250 OP 250 PS 637

## 2024-02-20 PROCEDURE — 96361 HYDRATE IV INFUSION ADD-ON: CPT

## 2024-02-20 PROCEDURE — 80074 ACUTE HEPATITIS PANEL: CPT

## 2024-02-20 PROCEDURE — 96374 THER/PROPH/DIAG INJ IV PUSH: CPT

## 2024-02-20 PROCEDURE — 87799 DETECT AGENT NOS DNA QUANT: CPT

## 2024-02-20 PROCEDURE — 99284 EMERGENCY DEPT VISIT MOD MDM: CPT

## 2024-02-20 PROCEDURE — 258N000003 HC RX IP 258 OP 636

## 2024-02-20 PROCEDURE — 83605 ASSAY OF LACTIC ACID: CPT | Performed by: FAMILY MEDICINE

## 2024-02-20 PROCEDURE — 86308 HETEROPHILE ANTIBODY SCREEN: CPT

## 2024-02-20 PROCEDURE — 81001 URINALYSIS AUTO W/SCOPE: CPT

## 2024-02-20 RX ORDER — HYDROMORPHONE HYDROCHLORIDE 1 MG/ML
0.5 INJECTION, SOLUTION INTRAMUSCULAR; INTRAVENOUS; SUBCUTANEOUS EVERY 30 MIN PRN
Status: DISCONTINUED | OUTPATIENT
Start: 2024-02-20 | End: 2024-02-21 | Stop reason: HOSPADM

## 2024-02-20 RX ORDER — IOPAMIDOL 755 MG/ML
100 INJECTION, SOLUTION INTRAVASCULAR ONCE
Status: COMPLETED | OUTPATIENT
Start: 2024-02-20 | End: 2024-02-20

## 2024-02-20 RX ADMIN — IOPAMIDOL 100 ML: 755 INJECTION, SOLUTION INTRAVENOUS at 21:59

## 2024-02-20 RX ADMIN — HYDROMORPHONE HYDROCHLORIDE 0.5 MG: 1 INJECTION, SOLUTION INTRAMUSCULAR; INTRAVENOUS; SUBCUTANEOUS at 22:31

## 2024-02-20 RX ADMIN — SODIUM CHLORIDE 1000 ML: 9 INJECTION, SOLUTION INTRAVENOUS at 22:31

## 2024-02-20 RX ADMIN — IBUPROFEN 600 MG: 200 TABLET, FILM COATED ORAL at 23:52

## 2024-02-20 ASSESSMENT — ENCOUNTER SYMPTOMS
BLOOD IN STOOL: 0
ANAL BLEEDING: 0
ABDOMINAL PAIN: 1
VOMITING: 1
DIFFICULTY URINATING: 0
SHORTNESS OF BREATH: 0
FEVER: 1
DYSURIA: 0
NAUSEA: 1
CONSTIPATION: 1
FATIGUE: 1

## 2024-02-20 ASSESSMENT — ACTIVITIES OF DAILY LIVING (ADL)
ADLS_ACUITY_SCORE: 33
ADLS_ACUITY_SCORE: 35

## 2024-02-21 VITALS
DIASTOLIC BLOOD PRESSURE: 84 MMHG | SYSTOLIC BLOOD PRESSURE: 150 MMHG | TEMPERATURE: 102.4 F | OXYGEN SATURATION: 91 % | BODY MASS INDEX: 36.06 KG/M2 | HEIGHT: 75 IN | HEART RATE: 96 BPM | RESPIRATION RATE: 20 BRPM | WEIGHT: 290 LBS

## 2024-02-21 LAB
ALBUMIN UR-MCNC: 100 MG/DL
APPEARANCE UR: CLEAR
BILIRUB UR QL STRIP: NEGATIVE
COLOR UR AUTO: YELLOW
GLUCOSE UR STRIP-MCNC: 100 MG/DL
HGB UR QL STRIP: NEGATIVE
KETONES UR STRIP-MCNC: NEGATIVE MG/DL
LEUKOCYTE ESTERASE UR QL STRIP: NEGATIVE
MUCOUS THREADS #/AREA URNS LPF: PRESENT /LPF
NITRATE UR QL: NEGATIVE
PH UR STRIP: 6 [PH] (ref 5–9)
RBC URINE: 1 /HPF
SP GR UR STRIP: 1.01 (ref 1–1.03)
UROBILINOGEN UR STRIP-MCNC: NORMAL MG/DL
WBC URINE: 1 /HPF

## 2024-02-21 PROCEDURE — 87040 BLOOD CULTURE FOR BACTERIA: CPT | Mod: 91 | Performed by: FAMILY MEDICINE

## 2024-02-21 PROCEDURE — 36415 COLL VENOUS BLD VENIPUNCTURE: CPT | Performed by: FAMILY MEDICINE

## 2024-02-21 PROCEDURE — 250N000013 HC RX MED GY IP 250 OP 250 PS 637: Performed by: FAMILY MEDICINE

## 2024-02-21 RX ADMIN — AMOXICILLIN AND CLAVULANATE POTASSIUM 1 TABLET: 875; 125 TABLET, FILM COATED ORAL at 00:16

## 2024-02-21 ASSESSMENT — ACTIVITIES OF DAILY LIVING (ADL): ADLS_ACUITY_SCORE: 35

## 2024-02-21 NOTE — ED PROVIDER NOTES
History     Chief Complaint   Patient presents with    Abdominal Pain    Nausea    Constipation    Hernia     HPI  Jaswant Chong is a 53 year old male who I am accepting care of at shift change.  He presented with abdominal pain and a fever.  He has a complicated history of an abdominal cancer status post resection.  Please see Iris Duncan's note for further details.  CT was unremarkable.  I was waiting for urinalysis and chest x-ray.  Both of those have returned negative.  At this time his symptoms likely represent a viral illness.  Patient felt comfortable discharging home with close follow-up with his PCP.    Allergies:  No Known Allergies    Problem List:    Patient Active Problem List    Diagnosis Date Noted    Varicose veins of left lower extremity with pain 10/26/2022     Priority: Medium    Essential hypertension 03/23/2020     Priority: Medium    Morbid obesity (H) 01/02/2020     Priority: Medium    Ascending aortic aneurysm (H24) 01/02/2020     Priority: Medium    Dyspnea on exertion 01/02/2020     Priority: Medium    Chronic combined systolic and diastolic congestive heart failure (H) 12/10/2019     Priority: Medium    Status post coronary angiogram on 7/26/2018 with luminal irregularities 07/26/2018     Priority: Medium    Adenocarcinoma, 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  Involvement of the terminal ileum   with mucinous adenocarcinoma of the appendix by direct extension.      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     Priority: Medium    Recurrent ventral hernia 09/14/2015     Priority: Medium    Controlled substance agreement signed 08/13/2015     Priority: Medium    Major depressive disorder, recurrent episode, moderate (H) 08/13/2015     Priority: Medium    Degenerative joint disease (DJD) of hip 02/06/2015     Priority: Medium    Hernia 12/03/2012     Priority: Medium    Abdominal pain 02/16/2012     Priority: Medium    Diabetes mellitus without complication (H) 12/07/2011     Priority: Medium    Ventral hernia with obstruction 12/06/2011     Priority: Medium    Varicocele 05/03/2011     Priority: Medium    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    EXCISE CYST GENERIC (LOCATION)      sebaceous, scalp    OTHER SURGICAL HISTORY      2007,HBR216,LYMPH NODE DISSECTION    OTHER SURGICAL HISTORY      03/08,,HERNIA REPAIR,Repair of surgical wound hernia, metastatic cancer incidentally noted.    OTHER SURGICAL HISTORY      04/08,03654.9,HX ABDOMEN PERITONEUM OMENTUM SURGERY,removal of left hemidiaphragm and omentum [Other] as well as intraperitoneal chemotherapy, 5 FU and Oxaliplatin for metastatic mucinous adenocarcinoma of the appendix.[[[    OTHER SURGICAL HISTORY      04/09,,HERNIA REPAIR, Ventral hernia repair, recurrent appendiceal carcinoma, resection of 7 centimeter left upper quadrant tumor with splenectomy, Surgeon, Dr. Marks     "OTHER SURGICAL HISTORY      11/2001,,HERNIA REPAIR,Reduction of ventral hernia and mesh repair of hernia    OTHER SURGICAL HISTORY      12/2012,,HERNIA REPAIR,Removal mesh with infection 12/12    OTHER SURGICAL HISTORY      4/25/13,[id number],OTHER,left thumb, Dr. Donald       Family History:    Family History   Problem Relation Age of Onset    Diabetes Father         Diabetes,type 2    Diabetes Paternal Grandmother         Diabetes,type 1       Social History:  Marital Status:   [2]  Social History     Tobacco Use    Smoking status: Never    Smokeless tobacco: Never   Vaping Use    Vaping Use: Never used   Substance Use Topics    Alcohol use: Yes     Alcohol/week: 0.0 standard drinks of alcohol     Comment: beer 2 times a month    Drug use: No        Medications:    amoxicillin-clavulanate (AUGMENTIN) 875-125 MG tablet  amLODIPine (NORVASC) 10 MG tablet  aspirin (ASA) 81 MG chewable tablet  Blood Pressure KIT  carvedilol (COREG) 25 MG tablet  cyclobenzaprine (FLEXERIL) 10 MG tablet  Insulin Aspart FlexPen 100 UNIT/ML SOPN  insulin pen needle (BD VELIA U/F) 32G X 4 MM miscellaneous  liraglutide (VICTOZA) 18 MG/3ML solution  lisinopril (ZESTRIL) 40 MG tablet  oxyCODONE-acetaminophen (PERCOCET) 5-325 MG tablet  oxyCODONE-acetaminophen (PERCOCET) 5-325 MG tablet  oxyCODONE-acetaminophen (PERCOCET) 5-325 MG tablet  rosuvastatin (CRESTOR) 20 MG tablet  sildenafil (VIAGRA) 100 MG tablet  terazosin (HYTRIN) 2 MG capsule  traZODone (DESYREL) 50 MG tablet  venlafaxine (EFFEXOR XR) 150 MG 24 hr capsule          Review of Systems    Physical Exam   BP: 133/82  Pulse: 53  Temp: 100.3  F (37.9  C)  Resp: 16  Height: 190.5 cm (6' 3\")  Weight: 131.5 kg (290 lb)  SpO2: 93 %      Physical Exam    ED Course              ED Course as of 02/21/24 0016 Tue Feb 20, 2024   2329 Sign out at shift change. Here with abdominal pain and fever. CT unremarkable. LFTs elevated. Labs sent out. Awaiting CXR and UA to complete work " up.      Procedures              Critical Care time:  none    Results for orders placed or performed during the hospital encounter of 02/20/24 (from the past 24 hour(s))   CBC with platelets differential    Narrative    The following orders were created for panel order CBC with platelets differential.  Procedure                               Abnormality         Status                     ---------                               -----------         ------                     CBC with platelets and d...[[id number]]                      Final result                 Please view results for these tests on the individual orders.   Comprehensive metabolic panel   Result Value Ref Range    Sodium 129 (L) 135 - 145 mmol/L    Potassium 3.8 3.4 - 5.3 mmol/L    Carbon Dioxide (CO2) 26 22 - 29 mmol/L    Anion Gap 11 7 - 15 mmol/L    Urea Nitrogen 19.4 6.0 - 20.0 mg/dL    Creatinine 1.31 (H) 0.67 - 1.17 mg/dL    GFR Estimate 65 >60 mL/min/1.73m2    Calcium 9.8 8.6 - 10.0 mg/dL    Chloride 92 (L) 98 - 107 mmol/L    Glucose 260 (H) 70 - 99 mg/dL    Alkaline Phosphatase 155 (H) 40 - 150 U/L     (H) 0 - 45 U/L     (H) 0 - 70 U/L    Protein Total 7.8 6.4 - 8.3 g/dL    Albumin 4.1 3.5 - 5.2 g/dL    Bilirubin Total 0.3 <=1.2 mg/dL   Lipase   Result Value Ref Range    Lipase 22 13 - 60 U/L   Lactic acid whole blood   Result Value Ref Range    Lactic Acid 2.0 0.7 - 2.0 mmol/L   CBC with platelets and differential   Result Value Ref Range    WBC Count 6.3 4.0 - 11.0 10e3/uL    RBC Count 5.12 4.40 - 5.90 10e6/uL    Hemoglobin 16.4 13.3 - 17.7 g/dL    Hematocrit 46.0 40.0 - 53.0 %    MCV 90 78 - 100 fL    MCH 32.0 26.5 - 33.0 pg    MCHC 35.7 31.5 - 36.5 g/dL    RDW 13.2 10.0 - 15.0 %    Platelet Count 246 150 - 450 10e3/uL    % Neutrophils 72 %    % Lymphocytes 12 %    % Monocytes 8 %    % Eosinophils 8 %    % Basophils 0 %    % Immature Granulocytes 0 %    NRBCs per 100 WBC 0 <1 /100    Absolute Neutrophils 4.5 1.6 - 8.3 10e3/uL     Absolute Lymphocytes 0.8 0.8 - 5.3 10e3/uL    Absolute Monocytes 0.5 0.0 - 1.3 10e3/uL    Absolute Eosinophils 0.5 0.0 - 0.7 10e3/uL    Absolute Basophils 0.0 0.0 - 0.2 10e3/uL    Absolute Immature Granulocytes 0.0 <=0.4 10e3/uL    Absolute NRBCs 0.0 10e3/uL   Extra Tube    Narrative    The following orders were created for panel order Extra Tube.  Procedure                               Abnormality         Status                     ---------                               -----------         ------                     Extra Blue Top Tube[[id number]]                              Final result               Extra Red Top Tube[[id number]]                               Final result                 Please view results for these tests on the individual orders.   Extra Blue Top Tube   Result Value Ref Range    Hold Specimen JIC    Extra Red Top Tube   Result Value Ref Range    Hold Specimen JIC    Mononucleosis screen   Result Value Ref Range    Mononucleosis Screen Negative Negative   Symptomatic Influenza A/B, RSV, & SARS-CoV2 PCR (COVID-19) Nose    Specimen: Nose; Swab   Result Value Ref Range    Influenza A PCR Negative Negative    Influenza B PCR Negative Negative    RSV PCR Negative Negative    SARS CoV2 PCR Negative Negative    Narrative    Testing was performed using the Xpert Xpress CoV2/Flu/RSV Assay on the [employer] GeneXpert Instrument. This test should be ordered for the detection of SARS-CoV-2, influenza, and RSV viruses in individuals who meet clinical and/or epidemiological criteria. Test performance is unknown in asymptomatic patients. This test is for in vitro diagnostic use under the FDA EUA for laboratories certified under CLIA to perform high or moderate complexity testing. This test has not been FDA cleared or approved. A negative result does not rule out the presence of PCR inhibitors in the specimen or target RNA in concentration below the limit of detection for the assay. If only one viral target is  positive but coinfection with multiple targets is suspected, the sample should be re-tested with another FDA cleared, approved, or authorized test, if coinfection would change clinical management. This test was validated by the [facility]. These laboratories are certified under the Clinical Laboratory Improvement Amendments of 1988 (CLIA-88) as qualified to perform high complexity laboratory testing.   CTA Abdomen Pelvis with Contrast    Narrative    PROCEDURE INFORMATION:   Exam: CTA Abdomen and Pelvis With Contrast   Exam date and time: 2/20/2024 9:52 PM   Age: 53 years old   Clinical indication: Abdominal pain; Localized; Upper; Prior surgery; Surgery   date: 6+ months; Surgery type: Appendix cancer 2006, hernia repairs starting in   2001 and last one 2009. Cancer last treated [id number] per patient. Lymph node   dissection 2007. Mesh placed in 2008 became infected and was removed per   patient. ; Additional info: Abdominal pain, HX aaa     TECHNIQUE:   Imaging protocol: Computed tomographic angiography of the abdomen and pelvis   with contrast. Exam focused on the arteries.   3D rendering (Not supervised by radiologist): MIP and/or 3D reconstructed   images were created by the technologist.   Radiation optimization: All CT scans at this facility use at least one of these   dose optimization techniques: automated exposure control; mA and/or kV   adjustment per patient size (includes targeted exams where dose is matched to   clinical indication); or iterative reconstruction.   Contrast material: ISOVUE 370; Contrast volume: 100 ml; Contrast route:   INTRAVENOUS (IV);      COMPARISON:   1.   CT ABDOMEN PELVIS W CONTRAST 7/29/2022 2:54 PM   2.   CT ABDOMEN PELVIS W CONTRAST 8/25/2023 3:40 PM     FINDINGS:   Aorta: No aortic aneurysm. No aortic dissection.   Celiac trunk and mesenteric arteries: No occlusion or significant stenosis.   Renal arteries: No occlusion or significant stenosis.   Right  iliac arteries: No occlusion or significant stenosis.   Left iliac arteries: No occlusion or significant stenosis.     Liver: No mass.   Gallbladder and bile ducts: Unremarkable. No calcified stones. No ductal   dilation.   Pancreas: Unremarkable. No mass. No ductal dilation.   Spleen: Possible partial splenectomy surgical changes stable from prior.   Adrenal glands: Small right adrenal gland nodule stable from comparison   measures 1.8 cm transverse diameter. Normal left adrenal gland.   Kidneys and ureters: Unremarkable. No solid mass. No hydronephrosis.   Stomach and bowel: Focal masslike opacity of proximal sigmoid colon anterior   wall region is nonspecific and stable from multiple comparison exams. Negative   for bowel obstruction or perforation. Negative for an acute inflammatory   changes of the bowel wall.   Appendix: Appendectomy.   Intraperitoneal space: Unremarkable. No free air. No significant fluid   collection.   Lymph nodes: Unremarkable.  No change from prior.  No definite enlarged lymph   nodes.  Mild prominence of the naina hepatis lymph nodes stable from comparison.    Urinary bladder: Unremarkable. No mass.   Reproductive: Unremarkable as visualized.   Bones/joints: No acute fracture.   Soft tissues: Large complex ventral abdominal wall hernia redemonstrated.       Impression    IMPRESSION:   Negative CTA abdomen and pelvis. No acute pathology identified.     THIS DOCUMENT HAS BEEN ELECTRONICALLY SIGNED BY MIRELLA CANSECO MD   UA with Microscopic reflex to Culture    Specimen: Urine, Midstream   Result Value Ref Range    Color Urine Yellow Colorless, Straw, Light Yellow, Yellow    Appearance Urine Clear Clear    Glucose Urine 100 (A) Negative mg/dL    Bilirubin Urine Negative Negative    Ketones Urine Negative Negative mg/dL    Specific Gravity Urine 1.010 1.005 - 1.030    Blood Urine Negative Negative    pH Urine 6.0 5.0 - 9.0    Protein Albumin Urine 100 (A) Negative mg/dL    Urobilinogen Urine  Normal Normal, 2.0 mg/dL    Nitrite Urine Negative Negative    Leukocyte Esterase Urine Negative Negative    Mucus Urine Present (A) None Seen /LPF    RBC Urine 1 <=2 /HPF    WBC Urine 1 <=5 /HPF    Narrative    Urine Culture not indicated  Urine Culture not indicated   XR Chest 2 Views    Narrative    PROCEDURE INFORMATION:   Exam: XR Chest   Exam date and time: 2/20/2024 11:52 PM   Age: 53 years old   Clinical indication: Fever; Additional info: Fever unknown origin     TECHNIQUE:   Imaging protocol: Radiologic exam of the chest.   Views: 2 views.     COMPARISON:   CR XR CHEST 2 VIEWS 8/15/2023 4:02 PM     FINDINGS:   Lungs: Unremarkable. No consolidation.  Stable small right upper lobe granuloma.  Pleural spaces: Unremarkable. No pleural effusion. No pneumothorax.   Heart/Mediastinum: Unremarkable. No cardiomegaly.   Bones/joints: Unremarkable.       Impression    IMPRESSION:   No acute findings.     THIS DOCUMENT HAS BEEN ELECTRONICALLY SIGNED BY MIRELLA CANSECO MD       Medications   HYDROmorphone (PF) (DILAUDID) injection 0.5 mg (0.5 mg Intravenous $Given 2/20/24 2231)   amoxicillin-clavulanate (AUGMENTIN) 875-125 MG per tablet 1 tablet (has no administration in time range)   sodium chloride 0.9% BOLUS 1,000 mL (0 mLs Intravenous Stopped 2/20/24 2349)   iopamidol (ISOVUE-370) solution 100 mL (100 mLs Intravenous $Given 2/20/24 2159)   ibuprofen (ADVIL/MOTRIN) tablet 600 mg (600 mg Oral $Given 2/20/24 2352)       Assessments & Plan (with Medical Decision Making)     I have reviewed the nursing notes.    I have reviewed the findings, diagnosis, plan and need for follow up with the patient.           Medical Decision Making  The patient's presentation was of moderate complexity (an undiagnosed new problem with uncertain diagnosis).    The patient's evaluation involved:  ordering and/or review of 3+ test(s) in this encounter (see separate area of note for details)    The patient's management necessitated only low  risk treatment.        New Prescriptions    AMOXICILLIN-CLAVULANATE (AUGMENTIN) 875-125 MG TABLET    Take 1 tablet by mouth 2 times daily for 5 days       Final diagnoses:   Abdominal pain   Elevated liver enzymes   Hyponatremia   Fever   Elevated serum creatinine   Asplenia   Given history of asplenia, blood cultures were drawn and are pending.  A dose of Augmentin was given.  He was instructed to fill the Augmentin tomorrow and take until his blood culture results come back in 48 hours.  He has follow-up with his regular doctor scheduled for Friday.    Close follow-up with PCP.  Several labs pending including CMV and EBV.  Influenza AB and RSV negative.  Chest x-ray negative.  Urinalysis negative.  White blood cell count normal.  LFTs are elevated, labs pending as noted.  Patient understands and agrees with the plan per AVS.  2/20/2024   [facility]       Nedra Gurrola, DO  02/21/24 0005       Nedra Gurrola, DO  02/21/24 0016

## 2024-02-21 NOTE — ED PROVIDER NOTES
History     Chief Complaint   Patient presents with    Abdominal Pain    Nausea    Constipation    Hernia     The history is provided by the patient, medical records and the spouse.     Jaswant Chong is a 53 year old male with PMH  malignant neoplasm of appendix, ventral hernia, multiple abdominal surgeries, HTH, obesity, T2DM, fatty liver who emergency department today with his wife.  He reports worsening abdominal pain that started yesterday and increasing throughout the day today and his abdomen.  He has been nauseated and did vomit this morning.  Reports 2 small hard bowel movements since yesterday.  Was passing gas up until about 5 PM today.  Temperature of 100-101 at home has been dizzy ending of shooting pain down his arms and legs and his rib cage when he stands up.  Has been taking his percocet for his chronic abdominal pain without much relief. He is concerned for a bowel obstruction as he reports he has a large inoperable ventral hernia.    Clinic visit 8/15/23 with : Malignant neoplasm of appendix vermiformis (H)  (primary encounter diagnosis) Comment: has ulcers on abdomen, that are chronic and previously his oncology surgeon felt this was not cancer related. Without abdomen musculature, bowel is very proximal to skin, and also poorly vascularized. Was offered a follow up with oncology surgery, but he wants to not do this. Will get a CT to evaluate mass near sigmoid as will as adrenal glands. CT scan 8/25/23: No new or enlarging nodules or masses in the abdomen or pelvis. Stable soft tissue density along the sigmoid colon, remains indeterminate for low grade malignancy versus granulomatous process  such as foreign body reaction. Anterior left abdominal wall hernia containing loops of  large and small bowel. Smaller rounded bone wall hernia containing  short segment of the large bowel    Follows with Dr. Marks at the [facility]  oncology: visit 2020:CT scan which demonstrated a 4.7 cm mass near  his sigmoid colon. There were no other suspicious findings. In reviewing his CT scans going back to 2011, this has been present since that time. In 2011, it was approximately 3.3 cm. In 2014, it was approximately 4.1-4.2 cm. Stable, minimal growth over the 8 years. Monitoring with PCP and CT scans    Allergies:  No Known Allergies    Problem List:    Patient Active Problem List    Diagnosis Date Noted    Varicose veins of left lower extremity with pain 10/26/2022     Priority: Medium    Essential hypertension 03/23/2020     Priority: Medium    Morbid obesity (H) 01/02/2020     Priority: Medium    Ascending aortic aneurysm (H24) 01/02/2020     Priority: Medium    Dyspnea on exertion 01/02/2020     Priority: Medium    Chronic combined systolic and diastolic congestive heart failure (H) 12/10/2019     Priority: Medium    Status post coronary angiogram on 7/26/2018 with luminal irregularities 07/26/2018     Priority: Medium    Adenocarcinoma, 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  Involvement of the terminal ileum   with mucinous adenocarcinoma of the appendix by direct extension.      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     Priority: Medium    Recurrent ventral hernia 09/14/2015     Priority: Medium    Controlled substance agreement signed 08/13/2015     Priority: Medium    Major depressive disorder, recurrent episode, moderate (H) 08/13/2015     Priority: Medium    Degenerative joint disease (DJD) of hip 02/06/2015      Priority: Medium    Hernia 12/03/2012     Priority: Medium    Abdominal pain 02/16/2012     Priority: Medium    Diabetes mellitus without complication (H) 12/07/2011     Priority: Medium    Ventral hernia with obstruction 12/06/2011     Priority: Medium    Varicocele 05/03/2011     Priority: Medium    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    EXCISE CYST GENERIC (LOCATION)      sebaceous, scalp    OTHER SURGICAL HISTORY      2007,[id number],LYMPH NODE DISSECTION    OTHER SURGICAL HISTORY      03/08,,HERNIA REPAIR,Repair of surgical wound hernia, metastatic cancer incidentally noted.    OTHER SURGICAL HISTORY      04/08,60740.9,HX ABDOMEN PERITONEUM OMENTUM SURGERY,removal of left hemidiaphragm and omentum [Other] as well as intraperitoneal chemotherapy, 5 FU and Oxaliplatin for metastatic mucinous adenocarcinoma of the appendix.[[[    OTHER SURGICAL HISTORY      04/09,,HERNIA REPAIR, Ventral hernia repair, recurrent appendiceal carcinoma, resection of 7 centimeter left upper quadrant tumor with splenectomy, Surgeon, Dr. Marks    OTHER SURGICAL HISTORY      11/2001,,HERNIA REPAIR,Reduction of ventral hernia and mesh repair of hernia    OTHER SURGICAL HISTORY      12/2012,,HERNIA REPAIR,Removal mesh with infection 12/12    OTHER SURGICAL HISTORY      4/25/13,[id number],OTHER,left thumb, Dr. Donald       Family History:    Family History   Problem  "Relation Age of Onset    Diabetes Father         Diabetes,type 2    Diabetes Paternal Grandmother         Diabetes,type 1       Social History:  Marital Status:   [2]  Social History     Tobacco Use    Smoking status: Never    Smokeless tobacco: Never   Vaping Use    Vaping Use: Never used   Substance Use Topics    Alcohol use: Yes     Alcohol/week: 0.0 standard drinks of alcohol     Comment: beer 2 times a month    Drug use: No        Medications:    amLODIPine (NORVASC) 10 MG tablet  aspirin (ASA) 81 MG chewable tablet  Blood Pressure KIT  carvedilol (COREG) 25 MG tablet  cyclobenzaprine (FLEXERIL) 10 MG tablet  Insulin Aspart FlexPen 100 UNIT/ML SOPN  insulin pen needle (BD VELIA U/F) 32G X 4 MM miscellaneous  liraglutide (VICTOZA) 18 MG/3ML solution  lisinopril (ZESTRIL) 40 MG tablet  oxyCODONE-acetaminophen (PERCOCET) 5-325 MG tablet  oxyCODONE-acetaminophen (PERCOCET) 5-325 MG tablet  oxyCODONE-acetaminophen (PERCOCET) 5-325 MG tablet  rosuvastatin (CRESTOR) 20 MG tablet  sildenafil (VIAGRA) 100 MG tablet  terazosin (HYTRIN) 2 MG capsule  traZODone (DESYREL) 50 MG tablet  venlafaxine (EFFEXOR XR) 150 MG 24 hr capsule          Review of Systems   Constitutional:  Positive for fatigue and fever.   Respiratory:  Negative for shortness of breath.    Cardiovascular:  Negative for chest pain.   Gastrointestinal:  Positive for abdominal pain, constipation, nausea and vomiting. Negative for anal bleeding and blood in stool.   Genitourinary:  Negative for difficulty urinating and dysuria.   All other systems reviewed and are negative.      Physical Exam   BP: 133/82  Pulse: 53  Temp: 100.3  F (37.9  C)  Resp: 16  Height: 190.5 cm (6' 3\")  Weight: 131.5 kg (290 lb)  SpO2: 93 %      Physical Exam  Vitals and nursing note reviewed.   Constitutional:       General: He is not in acute distress.     Appearance: He is not ill-appearing.   HENT:      Mouth/Throat:      Mouth: Mucous membranes are moist.   Cardiovascular: "      Rate and Rhythm: Bradycardia present.      Comments: Regular irregular  Pulmonary:      Effort: Pulmonary effort is normal. No respiratory distress.      Breath sounds: Normal breath sounds.   Abdominal:      General: A surgical scar is present. Bowel sounds are decreased.      Tenderness: There is abdominal tenderness in the right lower quadrant, periumbilical area and left lower quadrant.      Hernia: A hernia is present. Hernia is present in the ventral area.      Comments: Large abdominal mass/hernia noted, tender with palpation   Skin:     General: Skin is warm.      Capillary Refill: Capillary refill takes less than 2 seconds.      Comments: Scabs over left lower quadrant of abdomen, no erythema, drainage   Neurological:      General: No focal deficit present.      Mental Status: He is alert.         ED Course            ED Course as of 02/20/24 2341 Tue Feb 20, 2024 2329 Sign out at shift change. Here with abdominal pain and fever. CT unremarkable. LFTs elevated. Labs sent out. Awaiting CXR and UA to complete work up.          Results for orders placed or performed during the hospital encounter of 02/20/24 (from the past 24 hour(s))   CBC with platelets differential    Narrative    The following orders were created for panel order CBC with platelets differential.  Procedure                               Abnormality         Status                     ---------                               -----------         ------                     CBC with platelets and d...[[id number]]                      Final result                 Please view results for these tests on the individual orders.   Comprehensive metabolic panel   Result Value Ref Range    Sodium 129 (L) 135 - 145 mmol/L    Potassium 3.8 3.4 - 5.3 mmol/L    Carbon Dioxide (CO2) 26 22 - 29 mmol/L    Anion Gap 11 7 - 15 mmol/L    Urea Nitrogen 19.4 6.0 - 20.0 mg/dL    Creatinine 1.31 (H) 0.67 - 1.17 mg/dL    GFR Estimate 65 >60 mL/min/1.73m2     Calcium 9.8 8.6 - 10.0 mg/dL    Chloride 92 (L) 98 - 107 mmol/L    Glucose 260 (H) 70 - 99 mg/dL    Alkaline Phosphatase 155 (H) 40 - 150 U/L     (H) 0 - 45 U/L     (H) 0 - 70 U/L    Protein Total 7.8 6.4 - 8.3 g/dL    Albumin 4.1 3.5 - 5.2 g/dL    Bilirubin Total 0.3 <=1.2 mg/dL   Lipase   Result Value Ref Range    Lipase 22 13 - 60 U/L   Lactic acid whole blood   Result Value Ref Range    Lactic Acid 2.0 0.7 - 2.0 mmol/L   CBC with platelets and differential   Result Value Ref Range    WBC Count 6.3 4.0 - 11.0 10e3/uL    RBC Count 5.12 4.40 - 5.90 10e6/uL    Hemoglobin 16.4 13.3 - 17.7 g/dL    Hematocrit 46.0 40.0 - 53.0 %    MCV 90 78 - 100 fL    MCH 32.0 26.5 - 33.0 pg    MCHC 35.7 31.5 - 36.5 g/dL    RDW 13.2 10.0 - 15.0 %    Platelet Count 246 150 - 450 10e3/uL    % Neutrophils 72 %    % Lymphocytes 12 %    % Monocytes 8 %    % Eosinophils 8 %    % Basophils 0 %    % Immature Granulocytes 0 %    NRBCs per 100 WBC 0 <1 /100    Absolute Neutrophils 4.5 1.6 - 8.3 10e3/uL    Absolute Lymphocytes 0.8 0.8 - 5.3 10e3/uL    Absolute Monocytes 0.5 0.0 - 1.3 10e3/uL    Absolute Eosinophils 0.5 0.0 - 0.7 10e3/uL    Absolute Basophils 0.0 0.0 - 0.2 10e3/uL    Absolute Immature Granulocytes 0.0 <=0.4 10e3/uL    Absolute NRBCs 0.0 10e3/uL   Extra Tube    Narrative    The following orders were created for panel order Extra Tube.  Procedure                               Abnormality         Status                     ---------                               -----------         ------                     Extra Blue Top Tube[[id number]]                              Final result               Extra Red Top Tube[[id number]]                               Final result                 Please view results for these tests on the individual orders.   Extra Blue Top Tube   Result Value Ref Range    Hold Specimen JI    Extra Red Top Tube   Result Value Ref Range    Hold Specimen JIC    Mononucleosis screen   Result Value Ref  Range    Mononucleosis Screen Negative Negative   Symptomatic Influenza A/B, RSV, & SARS-CoV2 PCR (COVID-19) Nose    Specimen: Nose; Swab   Result Value Ref Range    Influenza A PCR Negative Negative    Influenza B PCR Negative Negative    RSV PCR Negative Negative    SARS CoV2 PCR Negative Negative    Narrative    Testing was performed using the Xpert Xpress CoV2/Flu/RSV Assay on the [employer] GeneXpert Instrument. This test should be ordered for the detection of SARS-CoV-2, influenza, and RSV viruses in individuals who meet clinical and/or epidemiological criteria. Test performance is unknown in asymptomatic patients. This test is for in vitro diagnostic use under the FDA EUA for laboratories certified under CLIA to perform high or moderate complexity testing. This test has not been FDA cleared or approved. A negative result does not rule out the presence of PCR inhibitors in the specimen or target RNA in concentration below the limit of detection for the assay. If only one viral target is positive but coinfection with multiple targets is suspected, the sample should be re-tested with another FDA cleared, approved, or authorized test, if coinfection would change clinical management. This test was validated by the [facility] "[employer]". These laboratories are certified under the Clinical Laboratory Improvement Amendments of 1988 (CLIA-88) as qualified to perform high complexity laboratory testing.   CTA Abdomen Pelvis with Contrast    Narrative    PROCEDURE INFORMATION:   Exam: CTA Abdomen and Pelvis With Contrast   Exam date and time: 2/20/2024 9:52 PM   Age: 53 years old   Clinical indication: Abdominal pain; Localized; Upper; Prior surgery; Surgery   date: 6+ months; Surgery type: Appendix cancer 2006, hernia repairs starting in   2001 and last one 2009. Cancer last treated [id number] per patient. Lymph node   dissection 2007. Mesh placed in 2008 became infected and was removed per   patient. ; Additional  info: Abdominal pain, HX aaa     TECHNIQUE:   Imaging protocol: Computed tomographic angiography of the abdomen and pelvis   with contrast. Exam focused on the arteries.   3D rendering (Not supervised by radiologist): MIP and/or 3D reconstructed   images were created by the technologist.   Radiation optimization: All CT scans at this facility use at least one of these   dose optimization techniques: automated exposure control; mA and/or kV   adjustment per patient size (includes targeted exams where dose is matched to   clinical indication); or iterative reconstruction.   Contrast material: ISOVUE 370; Contrast volume: 100 ml; Contrast route:   INTRAVENOUS (IV);      COMPARISON:   1.   CT ABDOMEN PELVIS W CONTRAST 7/29/2022 2:54 PM   2.   CT ABDOMEN PELVIS W CONTRAST 8/25/2023 3:40 PM     FINDINGS:   Aorta: No aortic aneurysm. No aortic dissection.   Celiac trunk and mesenteric arteries: No occlusion or significant stenosis.   Renal arteries: No occlusion or significant stenosis.   Right iliac arteries: No occlusion or significant stenosis.   Left iliac arteries: No occlusion or significant stenosis.     Liver: No mass.   Gallbladder and bile ducts: Unremarkable. No calcified stones. No ductal   dilation.   Pancreas: Unremarkable. No mass. No ductal dilation.   Spleen: Possible partial splenectomy surgical changes stable from prior.   Adrenal glands: Small right adrenal gland nodule stable from comparison   measures 1.8 cm transverse diameter. Normal left adrenal gland.   Kidneys and ureters: Unremarkable. No solid mass. No hydronephrosis.   Stomach and bowel: Focal masslike opacity of proximal sigmoid colon anterior   wall region is nonspecific and stable from multiple comparison exams. Negative   for bowel obstruction or perforation. Negative for an acute inflammatory   changes of the bowel wall.   Appendix: Appendectomy.   Intraperitoneal space: Unremarkable. No free air. No significant fluid   collection.    Lymph nodes: Unremarkable.  No change from prior.  No definite enlarged lymph   nodes.  Mild prominence of the naina hepatis lymph nodes stable from comparison.    Urinary bladder: Unremarkable. No mass.   Reproductive: Unremarkable as visualized.   Bones/joints: No acute fracture.   Soft tissues: Large complex ventral abdominal wall hernia redemonstrated.       Impression    IMPRESSION:   Negative CTA abdomen and pelvis. No acute pathology identified.     THIS DOCUMENT HAS BEEN ELECTRONICALLY SIGNED BY MIRELLA CANSECO MD       Medications   HYDROmorphone (PF) (DILAUDID) injection 0.5 mg (0.5 mg Intravenous $Given 2/20/24 2231)   ibuprofen (ADVIL/MOTRIN) tablet 600 mg (has no administration in time range)   sodium chloride 0.9% BOLUS 1,000 mL (1,000 mLs Intravenous $New Bag 2/20/24 2231)   iopamidol (ISOVUE-370) solution 100 mL (100 mLs Intravenous $Given 2/20/24 2159)       Assessments & Plan (with Medical Decision Making)  Temp: (!) 102.4  F (39.1  C) Temp src: Tympanic BP: (!) 150/94 Pulse: 94   Resp: 20 SpO2: 92 % O2 Device: None (Room air)   temp 100.3 here, reports temps at home 100-101. No hypotension. HR 50's- 60's.   Physical exam: Patient is not ill-appearing. He is alert and orientated. Breathing non-labored. Abdominal exam shows a large hernia, tender with palpation, decreased bowel sounds.   Patient has an extensive history of abdominal surgeries, malignant neoplasm of appendix, ventral hernia,  sigmoid mass, adrenal mass, splenectomy he is at risk for bowel ischemia, obstruction, incarcerated hernia. Also concern for aortic emergencies given change in abdominal pain, history AAA ( CT abdomen/pelvis 2019 mentions no abdominal aortic aneurysm however and patient does not recall). Other differentials include but not limited to: pancreatitis, choleycystitis, gastroentertis, constipation,   Will test for viral etiology as well, given reports of body aches, fever, nausea, vomiting   Labs: CBC: stable CMP: NA  129, creatinine 1.31, alk phos: 155, , , bilirubin normal, Lactic acid: 2.0 Lipase: normal  Flu/covid/rsv PCR:  negative mono: negative cmv:pending  hepatitis screen: pending  EBV: pending  Urinalysis: pending culture pending  Elevated liver enzymes: new compared to previous: considered medications, percocet/tylenol, rosuvastain- patient denies increased intake of tylenol and limited intake of percocet at home. Patient denies alcohol use; history fatty liver disease  sent out hepatitis labs, Monoscreen, EBV  Hyponatremia: patient reports a decrease in sodium intake in diet; question decreased intake or side effect from medications- effexor; IVF bolus given in ER; have patient close follow up in clinic  Radiology: CTA abd pelvis:  stable Negative CTA abdomen and pelvis. No acute pathology identified   Chest xray: pending  Meds: dilaudid, IVF bolus, ibuprofen   11:34 PM : reported by nursing staff patient has a temp of 102.4. Will obtain chest xray, urinalysis for further work-up for fever. Ibuprofen.   Plan:  Fever, abdominal pain, elevated liver enzymes, hyponatremia: discussed with patient follow up in clinic this week (appointment made for Friday with Emilia JASON) for re-evaluation of liver enzymes, sodium, creatinine .  With fever, further work-up as discussed above, care hand over given to Dr. Gurrola at change of shift.      I have reviewed the nursing notes.    I have reviewed the findings, diagnosis, plan and need for follow up with the patient.    Medical Decision Making  The patient's presentation was of moderate complexity (an acute illness with systemic symptoms).    The patient's evaluation involved:  an assessment requiring an independent historian (see separate area of note for details)  review of external note(s) from 3+ sources (see separate area of note for details)  review of 3+ test result(s) ordered prior to this encounter (see separate area of note for details)  ordering  and/or review of 3+ test(s) in this encounter (see separate area of note for details)    The patient's management necessitated further care after sign-out to Dr. Gurrola (see their note for further management).        New Prescriptions    No medications on file       Final diagnoses:   Abdominal pain   Elevated liver enzymes   Hyponatremia   Fever   Elevated serum creatinine       2/20/2024   [facility], APRN CNP  02/20/24 7736

## 2024-02-21 NOTE — ED TRIAGE NOTES
Pt comes in with c/o nausea, abdominal pain in LLQ, concern for constipation, decreased appetite and lethargy. States he has had various surgeries to remove cancer from [id number]. Has had a large inoperable hernia since then which hs caused problems. Pain not relieved by home tylenol or percocet. Pt primarily concerned with a bowel obstruction as he has had one in the past and feels the symptoms are similar.     Triage Assessment (Adult)       Row Name 02/20/24 3163          Triage Assessment    Airway WDL WDL        Respiratory WDL    Respiratory WDL WDL        Skin Circulation/Temperature WDL    Skin Circulation/Temperature WDL WDL        Cardiac WDL    Cardiac WDL WDL        Peripheral/Neurovascular WDL    Peripheral Neurovascular WDL WDL        Cognitive/Neuro/Behavioral WDL    Cognitive/Neuro/Behavioral WDL WDL

## 2024-02-21 NOTE — DISCHARGE INSTRUCTIONS
Etiology of her symptoms is unclear at this time.  There is no evidence of infection on imaging.  Certainly with a fever this could just be from a viral illness.  There are still a few lab test pending including an Huan-Barr virus and cytomegalovirus.  Please see your regular doctor in the next few days to have your liver function test repeated.  If any worsening symptoms, high fever associated with severe abdominal pain.  Blood in your stool inability to tolerate eating or drinking please return to the emergency room.

## 2024-02-21 NOTE — ED NOTES
Decision discussed with ordering provider to include venous phase during CT due the complexity of the patient's surgical/medical history to not miss any potiential pathology for imaging diagnosis.

## 2024-02-22 LAB
CMV DNA SPEC NAA+PROBE-ACNC: NOT DETECTED IU/ML
EBV DNA # SPEC NAA+PROBE: NOT DETECTED COPIES/ML
HAV IGM SERPL QL IA: NONREACTIVE
HBV CORE IGM SERPL QL IA: NONREACTIVE
HBV SURFACE AG SERPL QL IA: NONREACTIVE
HCV AB SERPL QL IA: NONREACTIVE

## 2024-02-23 ENCOUNTER — HOSPITAL ENCOUNTER (INPATIENT)
Facility: OTHER | Age: 54
LOS: 1 days | Discharge: HOME | End: 2024-02-24
Attending: FAMILY MEDICINE | Admitting: FAMILY MEDICINE
Payer: COMMERCIAL

## 2024-02-23 ENCOUNTER — OFFICE VISIT (OUTPATIENT)
Dept: FAMILY MEDICINE | Facility: OTHER | Age: 54
End: 2024-02-23
Attending: PHYSICIAN ASSISTANT
Payer: COMMERCIAL

## 2024-02-23 VITALS
WEIGHT: 297 LBS | OXYGEN SATURATION: 95 % | RESPIRATION RATE: 18 BRPM | HEIGHT: 75 IN | BODY MASS INDEX: 36.93 KG/M2 | DIASTOLIC BLOOD PRESSURE: 86 MMHG | SYSTOLIC BLOOD PRESSURE: 134 MMHG | HEART RATE: 76 BPM | TEMPERATURE: 98.1 F

## 2024-02-23 DIAGNOSIS — R79.89 ELEVATED LFTS: ICD-10-CM

## 2024-02-23 DIAGNOSIS — R10.9 ABDOMINAL PAIN, UNSPECIFIED ABDOMINAL LOCATION: ICD-10-CM

## 2024-02-23 DIAGNOSIS — E11.9 DIABETES MELLITUS WITHOUT COMPLICATION (H): ICD-10-CM

## 2024-02-23 DIAGNOSIS — Z90.81 POST-SPLENECTOMY: ICD-10-CM

## 2024-02-23 DIAGNOSIS — Q89.01 ASPLENIA: ICD-10-CM

## 2024-02-23 DIAGNOSIS — K43.2 RECURRENT VENTRAL HERNIA: ICD-10-CM

## 2024-02-23 DIAGNOSIS — Z90.81 S/P SPLENECTOMY: ICD-10-CM

## 2024-02-23 DIAGNOSIS — N18.1 CHRONIC KIDNEY DISEASE, STAGE 1: ICD-10-CM

## 2024-02-23 DIAGNOSIS — K76.0 FATTY LIVER: ICD-10-CM

## 2024-02-23 DIAGNOSIS — D84.9 DEFICIENCY SYNDROME, IMMUNOLOGIC (H): ICD-10-CM

## 2024-02-23 DIAGNOSIS — R10.30 LOWER ABDOMINAL PAIN: Primary | ICD-10-CM

## 2024-02-23 DIAGNOSIS — R74.8 ELEVATED LIVER ENZYMES: Primary | ICD-10-CM

## 2024-02-23 DIAGNOSIS — R50.9 FEVER IN ADULT: ICD-10-CM

## 2024-02-23 PROBLEM — C18.1: Status: ACTIVE | Noted: 2018-02-27

## 2024-02-23 LAB
ALBUMIN SERPL BCG-MCNC: 3.8 G/DL (ref 3.5–5.2)
ALP SERPL-CCNC: 244 U/L (ref 40–150)
ALT SERPL W P-5'-P-CCNC: 2733 U/L (ref 0–70)
AMMONIA PLAS-SCNC: 13 UMOL/L (ref 16–60)
ANION GAP SERPL CALCULATED.3IONS-SCNC: 12 MMOL/L (ref 7–15)
APAP SERPL-MCNC: <5 UG/ML (ref 10–30)
AST SERPL W P-5'-P-CCNC: 1830 U/L (ref 0–45)
BASOPHILS # BLD AUTO: NORMAL 10*3/UL
BASOPHILS # BLD MANUAL: 0 10E3/UL (ref 0–0.2)
BASOPHILS NFR BLD AUTO: NORMAL %
BASOPHILS NFR BLD MANUAL: 0 %
BILIRUB SERPL-MCNC: 0.6 MG/DL
BUN SERPL-MCNC: 11.8 MG/DL (ref 6–20)
CALCIUM SERPL-MCNC: 9.3 MG/DL (ref 8.6–10)
CHLORIDE SERPL-SCNC: 96 MMOL/L (ref 98–107)
CREAT SERPL-MCNC: 0.93 MG/DL (ref 0.67–1.17)
CRP SERPL-MCNC: 45.96 MG/L
DEPRECATED HCO3 PLAS-SCNC: 25 MMOL/L (ref 22–29)
EGFRCR SERPLBLD CKD-EPI 2021: >90 ML/MIN/1.73M2
EOSINOPHIL # BLD AUTO: NORMAL 10*3/UL
EOSINOPHIL # BLD MANUAL: 0 10E3/UL (ref 0–0.7)
EOSINOPHIL NFR BLD AUTO: NORMAL %
EOSINOPHIL NFR BLD MANUAL: 0 %
ERYTHROCYTE [DISTWIDTH] IN BLOOD BY AUTOMATED COUNT: 13.6 % (ref 10–15)
GLUCOSE BLDC GLUCOMTR-MCNC: 204 MG/DL (ref 70–99)
GLUCOSE BLDC GLUCOMTR-MCNC: 340 MG/DL (ref 70–99)
GLUCOSE SERPL-MCNC: 265 MG/DL (ref 70–99)
HCT VFR BLD AUTO: 47.1 % (ref 40–53)
HGB BLD-MCNC: 16.4 G/DL (ref 13.3–17.7)
HOLD SPECIMEN: NORMAL
IMM GRANULOCYTES # BLD: NORMAL 10*3/UL
IMM GRANULOCYTES NFR BLD: NORMAL %
INR PPP: 1.28 (ref 0.85–1.15)
LYMPHOCYTES # BLD AUTO: NORMAL 10*3/UL
LYMPHOCYTES # BLD MANUAL: 2.8 10E3/UL (ref 0.8–5.3)
LYMPHOCYTES NFR BLD AUTO: NORMAL %
LYMPHOCYTES NFR BLD MANUAL: 40 %
MCH RBC QN AUTO: 31.2 PG (ref 26.5–33)
MCHC RBC AUTO-ENTMCNC: 34.8 G/DL (ref 31.5–36.5)
MCV RBC AUTO: 90 FL (ref 78–100)
MONOCYTES # BLD AUTO: NORMAL 10*3/UL
MONOCYTES # BLD MANUAL: 0.6 10E3/UL (ref 0–1.3)
MONOCYTES NFR BLD AUTO: NORMAL %
MONOCYTES NFR BLD MANUAL: 8 %
NEUTROPHILS # BLD AUTO: NORMAL 10*3/UL
NEUTROPHILS # BLD MANUAL: 3.6 10E3/UL (ref 1.6–8.3)
NEUTROPHILS NFR BLD AUTO: NORMAL %
NEUTROPHILS NFR BLD MANUAL: 52 %
NRBC # BLD AUTO: 0 10E3/UL
NRBC BLD AUTO-RTO: 0 /100
PLAT MORPH BLD: ABNORMAL
PLATELET # BLD AUTO: 194 10E3/UL (ref 150–450)
POTASSIUM SERPL-SCNC: 4 MMOL/L (ref 3.4–5.3)
PROT SERPL-MCNC: 7.4 G/DL (ref 6.4–8.3)
RBC # BLD AUTO: 5.26 10E6/UL (ref 4.4–5.9)
RBC MORPH BLD: ABNORMAL
SODIUM SERPL-SCNC: 133 MMOL/L (ref 135–145)
VARIANT LYMPHS BLD QL SMEAR: PRESENT
WBC # BLD AUTO: 6.9 10E3/UL (ref 4–11)

## 2024-02-23 PROCEDURE — 99285 EMERGENCY DEPT VISIT HI MDM: CPT | Performed by: FAMILY MEDICINE

## 2024-02-23 PROCEDURE — 82784 ASSAY IGA/IGD/IGG/IGM EACH: CPT | Performed by: FAMILY MEDICINE

## 2024-02-23 PROCEDURE — 99222 1ST HOSP IP/OBS MODERATE 55: CPT | Mod: AI | Performed by: FAMILY MEDICINE

## 2024-02-23 PROCEDURE — 86015 ACTIN ANTIBODY EACH: CPT | Performed by: FAMILY MEDICINE

## 2024-02-23 PROCEDURE — 85048 AUTOMATED LEUKOCYTE COUNT: CPT | Mod: ZL | Performed by: PHYSICIAN ASSISTANT

## 2024-02-23 PROCEDURE — 85610 PROTHROMBIN TIME: CPT | Performed by: FAMILY MEDICINE

## 2024-02-23 PROCEDURE — 258N000003 HC RX IP 258 OP 636: Performed by: FAMILY MEDICINE

## 2024-02-23 PROCEDURE — 86038 ANTINUCLEAR ANTIBODIES: CPT | Performed by: FAMILY MEDICINE

## 2024-02-23 PROCEDURE — 99214 OFFICE O/P EST MOD 30 MIN: CPT | Performed by: PHYSICIAN ASSISTANT

## 2024-02-23 PROCEDURE — 85007 BL SMEAR W/DIFF WBC COUNT: CPT | Mod: ZL | Performed by: PHYSICIAN ASSISTANT

## 2024-02-23 PROCEDURE — 82140 ASSAY OF AMMONIA: CPT | Performed by: FAMILY MEDICINE

## 2024-02-23 PROCEDURE — 86225 DNA ANTIBODY NATIVE: CPT | Performed by: FAMILY MEDICINE

## 2024-02-23 PROCEDURE — 86376 MICROSOMAL ANTIBODY EACH: CPT | Performed by: FAMILY MEDICINE

## 2024-02-23 PROCEDURE — 36415 COLL VENOUS BLD VENIPUNCTURE: CPT | Performed by: FAMILY MEDICINE

## 2024-02-23 PROCEDURE — 83516 IMMUNOASSAY NONANTIBODY: CPT | Performed by: FAMILY MEDICINE

## 2024-02-23 PROCEDURE — 120N000001 HC R&B MED SURG/OB

## 2024-02-23 PROCEDURE — 250N000012 HC RX MED GY IP 250 OP 636 PS 637: Performed by: FAMILY MEDICINE

## 2024-02-23 PROCEDURE — G0463 HOSPITAL OUTPT CLINIC VISIT: HCPCS | Mod: 25

## 2024-02-23 PROCEDURE — 250N000013 HC RX MED GY IP 250 OP 250 PS 637: Performed by: FAMILY MEDICINE

## 2024-02-23 PROCEDURE — 80143 DRUG ASSAY ACETAMINOPHEN: CPT | Performed by: FAMILY MEDICINE

## 2024-02-23 PROCEDURE — 86140 C-REACTIVE PROTEIN: CPT | Performed by: FAMILY MEDICINE

## 2024-02-23 PROCEDURE — 80053 COMPREHEN METABOLIC PANEL: CPT | Mod: ZL | Performed by: PHYSICIAN ASSISTANT

## 2024-02-23 PROCEDURE — 36415 COLL VENOUS BLD VENIPUNCTURE: CPT | Mod: ZL | Performed by: PHYSICIAN ASSISTANT

## 2024-02-23 PROCEDURE — 82977 ASSAY OF GGT: CPT | Performed by: FAMILY MEDICINE

## 2024-02-23 RX ORDER — AMOXICILLIN 250 MG
1 CAPSULE ORAL 2 TIMES DAILY PRN
Status: DISCONTINUED | OUTPATIENT
Start: 2024-02-23 | End: 2024-02-24 | Stop reason: HOSPADM

## 2024-02-23 RX ORDER — TRAZODONE HYDROCHLORIDE 50 MG/1
50 TABLET, FILM COATED ORAL AT BEDTIME
Status: DISCONTINUED | OUTPATIENT
Start: 2024-02-23 | End: 2024-02-23

## 2024-02-23 RX ORDER — SODIUM CHLORIDE 9 MG/ML
INJECTION, SOLUTION INTRAVENOUS CONTINUOUS
Status: DISCONTINUED | OUTPATIENT
Start: 2024-02-23 | End: 2024-02-24 | Stop reason: HOSPADM

## 2024-02-23 RX ORDER — PROCHLORPERAZINE 25 MG
25 SUPPOSITORY, RECTAL RECTAL EVERY 12 HOURS PRN
Status: DISCONTINUED | OUTPATIENT
Start: 2024-02-23 | End: 2024-02-24 | Stop reason: HOSPADM

## 2024-02-23 RX ORDER — DEXTROSE MONOHYDRATE 25 G/50ML
25-50 INJECTION, SOLUTION INTRAVENOUS
Status: DISCONTINUED | OUTPATIENT
Start: 2024-02-23 | End: 2024-02-24 | Stop reason: HOSPADM

## 2024-02-23 RX ORDER — HYDRALAZINE HYDROCHLORIDE 20 MG/ML
10 INJECTION INTRAMUSCULAR; INTRAVENOUS EVERY 4 HOURS PRN
Status: DISCONTINUED | OUTPATIENT
Start: 2024-02-23 | End: 2024-02-24 | Stop reason: HOSPADM

## 2024-02-23 RX ORDER — NALOXONE HYDROCHLORIDE 0.4 MG/ML
0.2 INJECTION, SOLUTION INTRAMUSCULAR; INTRAVENOUS; SUBCUTANEOUS
Status: DISCONTINUED | OUTPATIENT
Start: 2024-02-23 | End: 2024-02-24 | Stop reason: HOSPADM

## 2024-02-23 RX ORDER — CALCIUM CARBONATE 500 MG/1
1000 TABLET, CHEWABLE ORAL 4 TIMES DAILY PRN
Status: DISCONTINUED | OUTPATIENT
Start: 2024-02-23 | End: 2024-02-24 | Stop reason: HOSPADM

## 2024-02-23 RX ORDER — AMLODIPINE BESYLATE 10 MG/1
10 TABLET ORAL DAILY
Status: DISCONTINUED | OUTPATIENT
Start: 2024-02-23 | End: 2024-02-24 | Stop reason: HOSPADM

## 2024-02-23 RX ORDER — ONDANSETRON 2 MG/ML
4 INJECTION INTRAMUSCULAR; INTRAVENOUS EVERY 6 HOURS PRN
Status: DISCONTINUED | OUTPATIENT
Start: 2024-02-23 | End: 2024-02-24 | Stop reason: HOSPADM

## 2024-02-23 RX ORDER — CARVEDILOL 25 MG/1
25 TABLET ORAL DAILY
COMMUNITY

## 2024-02-23 RX ORDER — CARVEDILOL 12.5 MG/1
25 TABLET ORAL 2 TIMES DAILY WITH MEALS
Status: DISCONTINUED | OUTPATIENT
Start: 2024-02-24 | End: 2024-02-24 | Stop reason: HOSPADM

## 2024-02-23 RX ORDER — OXYCODONE HYDROCHLORIDE 5 MG/1
10 TABLET ORAL EVERY 4 HOURS PRN
Status: DISCONTINUED | OUTPATIENT
Start: 2024-02-23 | End: 2024-02-24 | Stop reason: HOSPADM

## 2024-02-23 RX ORDER — OXYCODONE HYDROCHLORIDE 5 MG/1
5 TABLET ORAL EVERY 4 HOURS PRN
Status: DISCONTINUED | OUTPATIENT
Start: 2024-02-23 | End: 2024-02-24 | Stop reason: HOSPADM

## 2024-02-23 RX ORDER — AMOXICILLIN 250 MG
2 CAPSULE ORAL 2 TIMES DAILY PRN
Status: DISCONTINUED | OUTPATIENT
Start: 2024-02-23 | End: 2024-02-24 | Stop reason: HOSPADM

## 2024-02-23 RX ORDER — NALOXONE HYDROCHLORIDE 0.4 MG/ML
0.4 INJECTION, SOLUTION INTRAMUSCULAR; INTRAVENOUS; SUBCUTANEOUS
Status: DISCONTINUED | OUTPATIENT
Start: 2024-02-23 | End: 2024-02-24 | Stop reason: HOSPADM

## 2024-02-23 RX ORDER — IBUPROFEN 600 MG/1
600 TABLET, FILM COATED ORAL EVERY 6 HOURS PRN
Status: DISCONTINUED | OUTPATIENT
Start: 2024-02-23 | End: 2024-02-24 | Stop reason: HOSPADM

## 2024-02-23 RX ORDER — POLYETHYLENE GLYCOL 3350 17 G/17G
17 POWDER, FOR SOLUTION ORAL 2 TIMES DAILY PRN
Status: DISCONTINUED | OUTPATIENT
Start: 2024-02-23 | End: 2024-02-24 | Stop reason: HOSPADM

## 2024-02-23 RX ORDER — LIDOCAINE 40 MG/G
CREAM TOPICAL
Status: DISCONTINUED | OUTPATIENT
Start: 2024-02-23 | End: 2024-02-24 | Stop reason: HOSPADM

## 2024-02-23 RX ORDER — TERAZOSIN 1 MG/1
2 CAPSULE ORAL AT BEDTIME
Status: DISCONTINUED | OUTPATIENT
Start: 2024-02-23 | End: 2024-02-23

## 2024-02-23 RX ORDER — NICOTINE POLACRILEX 4 MG
15-30 LOZENGE BUCCAL
Status: DISCONTINUED | OUTPATIENT
Start: 2024-02-23 | End: 2024-02-24 | Stop reason: HOSPADM

## 2024-02-23 RX ORDER — PROCHLORPERAZINE MALEATE 10 MG
10 TABLET ORAL EVERY 6 HOURS PRN
Status: DISCONTINUED | OUTPATIENT
Start: 2024-02-23 | End: 2024-02-24 | Stop reason: HOSPADM

## 2024-02-23 RX ORDER — LISINOPRIL 40 MG/1
40 TABLET ORAL DAILY
Status: DISCONTINUED | OUTPATIENT
Start: 2024-02-23 | End: 2024-02-24 | Stop reason: HOSPADM

## 2024-02-23 RX ORDER — VENLAFAXINE HYDROCHLORIDE 75 MG/1
150 CAPSULE, EXTENDED RELEASE ORAL DAILY
Status: DISCONTINUED | OUTPATIENT
Start: 2024-02-23 | End: 2024-02-24 | Stop reason: HOSPADM

## 2024-02-23 RX ORDER — HYDRALAZINE HYDROCHLORIDE 10 MG/1
10 TABLET, FILM COATED ORAL EVERY 4 HOURS PRN
Status: DISCONTINUED | OUTPATIENT
Start: 2024-02-23 | End: 2024-02-24 | Stop reason: HOSPADM

## 2024-02-23 RX ORDER — BISACODYL 10 MG
10 SUPPOSITORY, RECTAL RECTAL DAILY PRN
Status: DISCONTINUED | OUTPATIENT
Start: 2024-02-23 | End: 2024-02-24 | Stop reason: HOSPADM

## 2024-02-23 RX ORDER — ONDANSETRON 4 MG/1
4 TABLET, ORALLY DISINTEGRATING ORAL EVERY 6 HOURS PRN
Status: DISCONTINUED | OUTPATIENT
Start: 2024-02-23 | End: 2024-02-24 | Stop reason: HOSPADM

## 2024-02-23 RX ADMIN — AMLODIPINE BESYLATE 10 MG: 10 TABLET ORAL at 17:31

## 2024-02-23 RX ADMIN — INSULIN ASPART 5 UNITS: 100 INJECTION, SOLUTION INTRAVENOUS; SUBCUTANEOUS at 17:33

## 2024-02-23 RX ADMIN — VENLAFAXINE HYDROCHLORIDE 150 MG: 75 CAPSULE, EXTENDED RELEASE ORAL at 17:31

## 2024-02-23 RX ADMIN — LISINOPRIL 40 MG: 40 TABLET ORAL at 17:31

## 2024-02-23 RX ADMIN — Medication: at 17:12

## 2024-02-23 RX ADMIN — INSULIN GLARGINE 10 UNITS: 100 INJECTION, SOLUTION SUBCUTANEOUS at 21:25

## 2024-02-23 ASSESSMENT — ENCOUNTER SYMPTOMS
CONSTIPATION: 0
STRIDOR: 0
ABDOMINAL PAIN: 1
POLYDIPSIA: 0
HEADACHES: 0
CHILLS: 0
DIARRHEA: 0
COUGH: 0
DIZZINESS: 0
WHEEZING: 0
POLYPHAGIA: 0
NAUSEA: 0
SHORTNESS OF BREATH: 0
DYSURIA: 0
TREMORS: 0
WEAKNESS: 1
CHOKING: 0
FEVER: 1
VOMITING: 0

## 2024-02-23 ASSESSMENT — ACTIVITIES OF DAILY LIVING (ADL)
ADLS_ACUITY_SCORE: 20
ADLS_ACUITY_SCORE: 35
ADLS_ACUITY_SCORE: 35
HEARING_DIFFICULTY_OR_DEAF: NO
DOING_ERRANDS_INDEPENDENTLY_DIFFICULTY: NO
WEAR_GLASSES_OR_BLIND: YES
ADLS_ACUITY_SCORE: 20
CHANGE_IN_FUNCTIONAL_STATUS_SINCE_ONSET_OF_CURRENT_ILLNESS/INJURY: NO
ADLS_ACUITY_SCORE: 20
CONCENTRATING,_REMEMBERING_OR_MAKING_DECISIONS_DIFFICULTY: NO
DIFFICULTY_EATING/SWALLOWING: NO
FALL_HISTORY_WITHIN_LAST_SIX_MONTHS: NO
ADLS_ACUITY_SCORE: 20
ADLS_ACUITY_SCORE: 35
ADLS_ACUITY_SCORE: 20
ADLS_ACUITY_SCORE: 20
DRESSING/BATHING_DIFFICULTY: NO
WALKING_OR_CLIMBING_STAIRS_DIFFICULTY: NO
TOILETING_ISSUES: NO
ADLS_ACUITY_SCORE: 20
DIFFICULTY_COMMUNICATING: NO
ADLS_ACUITY_SCORE: 35
ADLS_ACUITY_SCORE: 20
VISION_MANAGEMENT: GLASSES

## 2024-02-23 ASSESSMENT — PAIN SCALES - GENERAL: PAINLEVEL: EXTREME PAIN (8)

## 2024-02-23 ASSESSMENT — COLUMBIA-SUICIDE SEVERITY RATING SCALE - C-SSRS
2. HAVE YOU ACTUALLY HAD ANY THOUGHTS OF KILLING YOURSELF IN THE PAST MONTH?: NO
6. HAVE YOU EVER DONE ANYTHING, STARTED TO DO ANYTHING, OR PREPARED TO DO ANYTHING TO END YOUR LIFE?: NO
1. IN THE PAST MONTH, HAVE YOU WISHED YOU WERE DEAD OR WISHED YOU COULD GO TO SLEEP AND NOT WAKE UP?: NO

## 2024-02-23 ASSESSMENT — PATIENT HEALTH QUESTIONNAIRE - PHQ9
SUM OF ALL RESPONSES TO PHQ QUESTIONS 1-9: 10
SUM OF ALL RESPONSES TO PHQ QUESTIONS 1-9: 10
10. IF YOU CHECKED OFF ANY PROBLEMS, HOW DIFFICULT HAVE THESE PROBLEMS MADE IT FOR YOU TO DO YOUR WORK, TAKE CARE OF THINGS AT HOME, OR GET ALONG WITH OTHER PEOPLE: NOT DIFFICULT AT ALL

## 2024-02-23 NOTE — ED TRIAGE NOTES
Patient called by [facility] to come back in for abnormal labs. Reports chronic pain. No other complaints at this time. BP (!) 201/130   Pulse 77   Temp 98  F (36.7  C) (Tympanic)   Resp 16   Wt 134.7 kg (297 lb)   BMI 37.12 kg/m         Triage Assessment (Adult)       Row Name 02/23/24 1151          Triage Assessment    Airway WDL WDL        Skin Circulation/Temperature WDL    Skin Circulation/Temperature WDL WDL        Cardiac WDL    Cardiac WDL X  htn        Peripheral/Neurovascular WDL    Peripheral Neurovascular WDL WDL        Cognitive/Neuro/Behavioral WDL    Cognitive/Neuro/Behavioral WDL WDL

## 2024-02-23 NOTE — PATIENT INSTRUCTIONS
Lab work on average will return in 1-2 hours, unless listed otherwise below (your provider will typically review & provide you with results and recommendations within 1 day):  - CBC - blood counts  - CMP/BMP - kidney and electrolyte lab. CMP adds in liver function

## 2024-02-23 NOTE — PROGRESS NOTES
"  Assessment & Plan       ICD-10-CM    1. Elevated liver enzymes  R74.8 Comprehensive Metabolic Panel     CBC and Differential      2. Recurrent ventral hernia  K43.2       3. Fatty liver  K76.0       4. 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 Chronic kidney disease, stage 1  N18.1         Enzo is a pleasant, alert 53-year-old male in no acute distress. Co-morbid conditions include recurrent ventral hernia, secondary to surgery.  He also has fatty liver disease, current LFT elevation is greater than expected for fatty liver.  He is also post splenectomy. He presented today for an ER follow-up.  During ER visit he was noted to have an elevated AST of 473 and ALT of 447 (previously 35 and 36 respectively when checked last in August 2023), he has had a chronic alkaline phosphatase elevation for the last few years.  Lab work today returning with AST 1830 and ALT 2733, these are both critical values.  Question acute hepatic injury versus other etiology to symptoms (medication, intrahepatic process, etc.).  Patient would benefit from additional evaluation such as: autoimmune markers, ERCP/imaging if indicated, etc.). Renal function stable, GFR 90, creatinine 0.93. Patient was contacted in regards to results and recommended prompt reevaluation in the emergency room for further evaluation.  Enzo is receptive this information and will go into the ER ASAP. Patient is in agreement and understanding of the above treatment plan. All questions and concerns were addressed and answered to patient's satisfaction. AVS reviewed with patient.     BMI  Estimated body mass index is 37.12 kg/m  as calculated from the following:    Height as of this encounter: 1.905 m (6' 3\").    Weight as of this encounter: 134.7 kg (297 lb).   Weight management plan: Discussed healthy diet and exercise guidelines    Depression Screening Follow Up        2/23/2024     8:30 AM   PHQ   PHQ-9 Total Score 10   Q9: Thoughts of better off dead/self-harm past 2 " weeks Several days   F/U: Thoughts of suicide or self-harm No   F/U: Safety concerns No         2/23/2024     8:30 AM   Last PHQ-9   1.  Little interest or pleasure in doing things 1   2.  Feeling down, depressed, or hopeless 1   3.  Trouble falling or staying asleep, or sleeping too much 1   4.  Feeling tired or having little energy 2   5.  Poor appetite or overeating 1   6.  Feeling bad about yourself 1   7.  Trouble concentrating 1   8.  Moving slowly or restless 1   Q9: Thoughts of better off dead/self-harm past 2 weeks 1   PHQ-9 Total Score 10   In the past two weeks have you had thoughts of suicide or self harm? No   Do you have concerns about your personal safety or the safety of others? No     Follow Up      Follow Up Actions Taken  Crisis resource information provided in the After Visit Summary    Discussed the following ways the patient can remain in a safe environment:  be around others    See Patient Instructions    Return if symptoms worsen or fail to improve.    Ariane Giraldo is a 53 year old, presenting for the following health issues:  Clinic Care Coordination - Follow-up (ER)        2/23/2024     8:48 AM   Additional Questions   Roomed by gus mireles lpn     History of Present Illness       Reason for visit:  Er follow up    He eats 2-3 servings of fruits and vegetables daily.He consumes 0 sweetened beverage(s) daily.He exercises with enough effort to increase his heart rate 9 or less minutes per day.  He exercises with enough effort to increase his heart rate 3 or less days per week.   He is taking medications regularly.    ED/UC Followup:    Facility:  [facility] ER  Date of visit: 2-20-24  Reason for visit: ABD pain with Hyponatremia and elevated liver enzymes.  Current Status: slightly better    Enzo presents to the clinic today in regards to ER follow-up, he was seen on 2/20/2024.    Presented to ER on 2/20/24 with concerns of abdominal pain x1 day, worsening. + fevers, constipation, vomiting/nausea.  "Concerns of bowel obstruction. LFTs elevated, normal CTA. Negative multiplex. Started on Augmentin. Send out hepatitis panel.     Lab work - negative hepatitis panel, EBV and CMV.     LFT panel -  (previous 35),  (previously 36), Alk phos 155 (previously 142). Previous lab work comparison from 8/15/23.     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 + ventral hernia secondary to surgery (oblique musculature was removed). Comorbid conditions - type II DM, GERBER    Follows with Dr. Marks - [facility] - annual scans.     Enzo reports today that he is still having some abdominal discomfort but this is improved, this is a 5-6 out of 10 versus an 8-10 out of 10 brought him to the ER.  He is taking in fluids well.  He is hungry but has not eaten much for solids/fluid intake other than liquids.  He does report diffuse body pains and night sweats.  He declines any fevers today.  He does report a temperature of 101 degrees yesterday morning, this later went down to 98 degrees later in the day, followed by an increase to 100 degrees with sweats at night.  He declines any jaundice sensation, confusion, etc.  No hematochezia or hematemesis.  No urinary symptoms.  No cough, congestion, sore throat, ear pain, etc. Has been feeling under the weather for the last few months. Concerns of cancer as previous appendiceal cancer presented with similar symptoms to his current symptomatology.     No alcohol or tobacco use.  No NSAID use.  He has not anticoagulated nor on amiodarone.    Review of Systems  Constitutional, HEENT, cardiovascular, pulmonary, GI, , musculoskeletal, neuro, skin, endocrine and psych systems are negative, except as otherwise noted.        Objective    /86 (BP Location: Right arm, Patient Position: Sitting, Cuff Size: Adult Large)   Pulse 76   Temp 98.1  F (36.7  C) (Tympanic)   Resp 18   Ht 1.905 m (6' 3\")   Wt 134.7 kg (297 lb)   SpO2 95%   BMI 37.12 kg/m    Body mass " index is 37.12 kg/m .  Physical Exam   GENERAL: alert and no distress  EYES: Eyes grossly normal to inspection, PERRL and conjunctivae and sclerae normal  HENT: ear canals and TM's normal, nose and mouth without ulcers or lesions  NECK: no adenopathy, no asymmetry, masses, or scars  RESP: lungs clear to auscultation - no rales, rhonchi or wheezes  CV: regular rate and rhythm, normal S1 S2, no S3 or S4, no murmur, click or rub, no peripheral edema  ABDOMEN: soft, nontender, liver span normal to percussion, bowel sounds normal, and hernia - large, ventral, LLQ  MS: no gross musculoskeletal defects noted, no edema  SKIN: no suspicious lesions or rashes  PSYCH: mentation appears normal, affect normal/bright  LYMPH: normal ant/post cervical, supraclavicular nodes    Results for orders placed or performed during the hospital encounter of 02/23/24   INR     Status: Abnormal   Result Value Ref Range    INR 1.28 (H) 0.85 - 1.15   Extra Tube     Status: None    Narrative    The following orders were created for panel order Extra Tube.  Procedure                               Abnormality         Status                     ---------                               -----------         ------                     Extra Red Top Tube[[id number]]                               Final result               Extra Serum Separator Tu...[[id number]]                      Final result                 Please view results for these tests on the individual orders.   Extra Red Top Tube     Status: None   Result Value Ref Range    Hold Specimen JIC    Extra Serum Separator Tube (SST)     Status: None   Result Value Ref Range    Hold Specimen JIC    Ammonia (on ice)     Status: Abnormal   Result Value Ref Range    Ammonia 13 (L) 16 - 60 umol/L   Acetaminophen GH     Status: Abnormal   Result Value Ref Range    Acetaminophen <5.0 (L) 10.0 - 30.0 ug/mL   Extra Tube     Status: None    Narrative    The following orders were created for panel order Extra  Tube.  Procedure                               Abnormality         Status                     ---------                               -----------         ------                     Extra Red Top Tube[[id number]]                               Final result               Extra Serum Separator Tu...[[id number]]                      Final result               Extra Serum Separator Tu...[[id number]]                      Final result                 Please view results for these tests on the individual orders.   Extra Red Top Tube     Status: None   Result Value Ref Range    Hold Specimen JIC    Extra Serum Separator Tube (SST)     Status: None   Result Value Ref Range    Hold Specimen JIC    Extra Serum Separator Tube (SST)     Status: None   Result Value Ref Range    Hold Specimen JIC    Results for orders placed or performed in visit on 02/23/24   Comprehensive Metabolic Panel     Status: Abnormal   Result Value Ref Range    Sodium 133 (L) 135 - 145 mmol/L    Potassium 4.0 3.4 - 5.3 mmol/L    Carbon Dioxide (CO2) 25 22 - 29 mmol/L    Anion Gap 12 7 - 15 mmol/L    Urea Nitrogen 11.8 6.0 - 20.0 mg/dL    Creatinine 0.93 0.67 - 1.17 mg/dL    GFR Estimate >90 >60 mL/min/1.73m2    Calcium 9.3 8.6 - 10.0 mg/dL    Chloride 96 (L) 98 - 107 mmol/L    Glucose 265 (H) 70 - 99 mg/dL    Alkaline Phosphatase 244 (H) 40 - 150 U/L    AST 1,830 (HH) 0 - 45 U/L    ALT 2,733 (HH) 0 - 70 U/L    Protein Total 7.4 6.4 - 8.3 g/dL    Albumin 3.8 3.5 - 5.2 g/dL    Bilirubin Total 0.6 <=1.2 mg/dL   CBC with platelets and differential     Status: None   Result Value Ref Range    WBC Count 6.9 4.0 - 11.0 10e3/uL    RBC Count 5.26 4.40 - 5.90 10e6/uL    Hemoglobin 16.4 13.3 - 17.7 g/dL    Hematocrit 47.1 40.0 - 53.0 %    MCV 90 78 - 100 fL    MCH 31.2 26.5 - 33.0 pg    MCHC 34.8 31.5 - 36.5 g/dL    RDW 13.6 10.0 - 15.0 %    Platelet Count 194 150 - 450 10e3/uL    % Neutrophils      % Lymphocytes      % Monocytes      % Eosinophils      % Basophils       % Immature Granulocytes      NRBCs per 100 WBC 0 <1 /100    Absolute Neutrophils      Absolute Lymphocytes      Absolute Monocytes      Absolute Eosinophils      Absolute Basophils      Absolute Immature Granulocytes      Absolute NRBCs 0.0 10e3/uL   Manual Differential     Status: Abnormal   Result Value Ref Range    % Neutrophils 52 %    % Lymphocytes 40 %    % Monocytes 8 %    % Eosinophils 0 %    % Basophils 0 %    Absolute Neutrophils 3.6 1.6 - 8.3 10e3/uL    Absolute Lymphocytes 2.8 0.8 - 5.3 10e3/uL    Absolute Monocytes 0.6 0.0 - 1.3 10e3/uL    Absolute Eosinophils 0.0 0.0 - 0.7 10e3/uL    Absolute Basophils 0.0 0.0 - 0.2 10e3/uL    RBC Morphology Confirmed RBC Indices     Platelet Assessment  Automated Count Confirmed. Platelet morphology is normal.     Automated Count Confirmed. Platelet morphology is normal.    Reactive Lymphocytes Present (A) None Seen   CBC and Differential     Status: Abnormal    Narrative    The following orders were created for panel order CBC and Differential.  Procedure                               Abnormality         Status                     ---------                               -----------         ------                     CBC with platelets and d...[[id number]]                      Final result               Manual Differential[[id number]]          Abnormal            Final result                 Please view results for these tests on the individual orders.     Signed Electronically by: Emilia Whitfield PA-C

## 2024-02-23 NOTE — H&P
"[facility]    History and Physical  Hospitalist       Date of Admission:  2/23/2024    Assessment & Plan   Jaswant Chong is a 53 year old male who presents with abdominal pain and elevated LFTs.    Clinically Significant Risk Factors Present on Admission               # Coagulation Defect: INR = 1.28 (Ref range: 0.85 - 1.15) and/or PTT = N/A, will monitor for bleeding  # Drug Induced Platelet Defect: home medication list includes an antiplatelet medication   # Hypertension: Noted on problem list      # Obesity: Estimated body mass index is 37.12 kg/m  as calculated from the following:    Height as of an earlier encounter on 2/23/24: 1.905 m (6' 3\").    Weight as of this encounter: 134.7 kg (297 lb).                 Principal Problem:    Abdominal pain, unspecified abdominal location    Assessment: Etiology of his pain is unclear.  The pain in his hernia associated with a fever is unlikely to be related to his elevated LFTs.  He did get better while he was taking Augmentin but it is not certain whether that was actually because of the antibiotic or because it just happened.  Certainly he could have had diverticulitis which is in that area as well although nothing was demonstrated on imaging.    Plan: At this point he is at his baseline symptoms and I think we can employ expectant management    Active Problems:    Diabetes mellitus without complication (H)    Assessment: Compliance has been poor and he has had terrible control over a long period of time    Plan: At this point will not start Victoza again but will do NovoLog coverage of meals and follow 4 times daily blood sugars.  Will give him 10 units of Lantus tonight although I suspect he needs a lot more because of his insulin resistance.  Would consider referral for diabetic education and possible CGM in the future.      HTN (hypertension), malignant    Assessment: Pressure is quite elevated but this is more likely due to the fact that he " is held his medications.  He is notoriously noncompliant with taking medications as well    Plan: While we want a make sure that his medications are not contributing to his elevated LFTs, certainly we need to control his blood pressure to prevent stroke.  Will resume some of his blood pressure medications and monitor closely.  Could certainly use as needed hydralazine but will try and avoid that if possible      Post-splenectomy    Assessment: His last dose of Hib was 12/8/2015 as was his last Menactra and Prevnar 13.    Plan: Likely needs revaccinated with immunizations for encapsulated bacteria as an outpatient      Elevated LFTs    Assessment: Etiology is unclear.  His hepatitis, CMV and EBV titers were all negative.  His liver functions have shot up dramatically in a short period of time.  Nothing on imaging that would contribute to this.  It is unlikely that any of his medications would be contributing but we did hold what we felt we could.  Certainly could have autoimmune hepatitis.    Plan: He has reactive lymphocytes on his CBC so I am going to do a Monospot despite his negative IgG for CMV and EBV.  Will continue to follow his LFTs and if there going down tomorrow likely can be discharged and be followed as an outpatient.  I did put orders in for other testing for autoimmune hepatitis but he understands that those will go to the reference lab and may take quite some time to come back.  Certainly if his LFTs remain elevated, GI consultation would be indicated.    DVT Prophylaxis: Pneumatic Compression Devices  Code Status: Full Code    Naveed Tidwell MD    Primary Care Physician   Mik Kumari    Chief Complaint   Abdominal pain, elevated LFTs    History is obtained from the patient, his wife, ER MD, and chart review.    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  He has a chronic large ventral wall hernia in the left lower quadrant that is felt to be too difficult to repair because of the lack of his oblique muscles.  He has constant pain in this area that he rates most days of 5-6 out of 10 but he gets by with that and lives his life using small amounts of pain medicine from time to time.  He has a history of hypertension, type 2 diabetes.  He was seen in the emergency room on 2/20/2024 with increasing nausea vomiting and fever to 101.  He has had pain by his left upper rib cage and his chronic left lower quadrant abdominal pain seem to be significantly worsening.  He was concern for bowel obstruction with possibly something incarcerated in his hernia.  He came to the emergency room and was evaluated.  He had mildly elevated LFTs with an AST and ALT in the 400s.  Slight increase in creatinine and slightly low sodium but normal lipase lactate and CBC.  His multiplex was negative and CTA of the abdomen and pelvis with contrast was negative with no pathology identified.  There is no evidence of obstruction, his liver appeared normal.  He has a small stable right adrenal nodule and evidence of previous splenectomy.  Concern for his elevated liver function was perhaps infectious versus medication versus autoimmune.  He is very noncompliant with his medications in general and has poor control of his diabetes.  It was felt that likely his etiology was viral and he was sent home on some Augmentin because of his asplenia with fever and follow-up was arranged for him in the clinic.    He was seen today by Emilia Whitfield in the clinic and his AST had bumped from 473 up to 1830 and his ALT went from 447 up to 2733.  These 2 values were normal in August 2023.  His hepatitis testing and EBV and CMV testing done in the emergency room all came back negative.  He was sent to the emergency room for further evaluation due to his elevated LFTs which were noted after he  left the clinic.  He is actually feeling a little bit better today than he had been over the last several days.  The emergency room physician spoke to the on-call gastroenterologist at [facility].  It was suggested that he try and hold as many medications as possible.  They elected to hold his Augmentin.  He has not taken his blood pressure medicines or any medicines for the last 2 days.  His blood pressure is quite high as is his blood sugar.    Currently the patient feels better with regard to his left lower quadrant pain by his hernia but still notes it is a 6 out of 10 which she says is his baseline.  Really does not have any right upper quadrant tenderness.  And he says he feels better now than he has in the last week.    Past Medical History    I have reviewed this patient's medical history and updated it with pertinent information if needed.   Past Medical History:   Diagnosis Date    Malignant neoplasm of appendix (H)     H/O    Type 2 diabetes mellitus without complications (H)     Type 2    Ventral hernia without obstruction or gangrene     No Comments Provided    Vesicointestinal fistula     history of, secondary to diverticulitis.       Past Surgical History   I have reviewed this patient's surgical history and updated it with pertinent information if needed.  Past Surgical History:   Procedure Laterality Date    COLON SURGERY      2006, Hemicolectomy with appendiceal cancer noted, mucinous type.    EXCISE CYST GENERIC (LOCATION)      sebaceous, scalp    OTHER SURGICAL HISTORY      2007,YUB302,LYMPH NODE DISSECTION    OTHER SURGICAL HISTORY      03/08,,HERNIA REPAIR,Repair of surgical wound hernia, metastatic cancer incidentally noted.    OTHER SURGICAL HISTORY      04/08,82612.9,HX ABDOMEN PERITONEUM OMENTUM SURGERY,removal of left hemidiaphragm and omentum [Other] as well as intraperitoneal chemotherapy, 5 FU and Oxaliplatin for metastatic mucinous adenocarcinoma of the appendix.[[[    OTHER  SURGICAL HISTORY      04/09,,HERNIA REPAIR, Ventral hernia repair, recurrent appendiceal carcinoma, resection of 7 centimeter left upper quadrant tumor with splenectomy, Surgeon, Dr. Marks    OTHER SURGICAL HISTORY      11/2001,,HERNIA REPAIR,Reduction of ventral hernia and mesh repair of hernia    OTHER SURGICAL HISTORY      12/2012,,HERNIA REPAIR,Removal mesh with infection 12/12    OTHER SURGICAL HISTORY      4/25/13,[id number],OTHER,left thumb, Dr. Donald       Prior to Admission Medications   Prior to Admission Medications   Prescriptions Last Dose Informant Patient Reported? Taking?   Blood Pressure KIT  Self Yes No   Sig: Blood pressure machine   Insulin Aspart FlexPen 100 UNIT/ML SOPN 2/20/2024 Self No Yes   Sig: Inject 20 Units Subcutaneous 3 times daily (with meals)   amLODIPine (NORVASC) 10 MG tablet 2/20/2024 at AM Self No Yes   Sig: Take 1 tablet (10 mg) by mouth daily   amoxicillin-clavulanate (AUGMENTIN) 875-125 MG tablet 2/23/2024 at AM Self No Yes   Sig: Take 1 tablet by mouth 2 times daily for 5 days   aspirin (ASA) 81 MG chewable tablet 2/20/2024 at AM Self Yes Yes   Sig: Take 81 mg by mouth daily   carvedilol (COREG) 25 MG tablet 2/20/2024 at AM Self Yes Yes   Sig: Take 25 mg by mouth daily   cyclobenzaprine (FLEXERIL) 10 MG tablet 1/15/2024 Self No Yes   Sig: Take 1 tablet (10 mg) by mouth 3 times daily as needed for muscle spasms   insulin pen needle (BD VELIA U/F) 32G X 4 MM miscellaneous  Self No No   Sig: INJECTING THREE TIMES DAILY BEFORE MEALS   liraglutide (VICTOZA) 18 MG/3ML solution 2/20/2024 at AM Self No Yes   Sig: Inject 0.6 mg Subcutaneous daily   lisinopril (ZESTRIL) 40 MG tablet 2/20/2024 Self No Yes   Sig: TAKE 1 TABLET(40 MG) BY MOUTH DAILY   oxyCODONE-acetaminophen (PERCOCET) 5-325 MG tablet 2/22/2024 at PM Self No Yes   Sig: Take 1 tablet by mouth every 6 hours as needed for pain   oxyCODONE-acetaminophen (PERCOCET) 5-325 MG tablet  Self No No   Sig: Take 1 tablet  by mouth every 4 hours as needed for pain   oxyCODONE-acetaminophen (PERCOCET) 5-325 MG tablet  Self No No   Sig: Take 1 tablet by mouth every 6 hours as needed for pain   rosuvastatin (CRESTOR) 20 MG tablet 2/20/2024 at AM Self No Yes   Sig: TAKE 1 TABLET(20 MG) BY MOUTH DAILY   terazosin (HYTRIN) 2 MG capsule More than a month Self No Yes   Sig: Take 1 capsule (2 mg) by mouth at bedtime   venlafaxine (EFFEXOR XR) 150 MG 24 hr capsule 2/20/2024 Self No Yes   Sig: Take 1 capsule (150 mg) by mouth daily      Facility-Administered Medications: None     Allergies   No Known Allergies    Social History   I have reviewed this patient's social history and updated it with pertinent information if needed. Jaswant Chong  reports that he has never smoked. He has never used smokeless tobacco. He reports current alcohol use. He reports that he does not use drugs.    Family History   I have reviewed this patient's family history and updated it with pertinent information if needed.   Family History   Problem Relation Age of Onset    Diabetes Father         Diabetes,type 2    Diabetes Paternal Grandmother         Diabetes,type 1       Review of Systems     REVIEW OF SYSTEMS:    Constitutional: Decreased energy and appetite, no recent sick contacts  Eyes: no changes in vision  Ears, nose, mouth, throat, and face: no mouth sores, dysphagia, or odynophagia  Respiratory: no shortness of breath, cough, or wheezing. No aspiration symptoms.   Cardiovascular: no chest pain, palpitations, orthopnea, increased lower extremity edema, or syncope.   Gastrointestinal: See HPI  Genitourinary: no dysuria, hematuria, urgency or frequency.   Hematologic/lymphatic: no unintentional weight loss but he has had night sweats.  Musculoskeletal: no pain to extremities or falls.   Neurological: no new weakness, tingling, numbness.   Psychiatric: no hallucinations or delusions.  Endocrine: He is  a known diabetic.  He is blood sugars have not been well  controlled.  He has been on 20 units of insulin 3 times daily and Victoza 1.8 mg daily        Physical Exam   Temp: 98.6  F (37  C) Temp src: Tympanic BP: (!) 158/114 Pulse: 73   Resp: 16 SpO2: 96 % O2 Device: None (Room air)    Vital Signs with Ranges  Temp:  [98  F (36.7  C)-98.6  F (37  C)] 98.6  F (37  C)  Pulse:  [73-82] 73  Resp:  [16-18] 16  BP: (134-203)/() 158/114  SpO2:  [95 %-97 %] 96 %  297 lbs 0 oz    Constitutional: Alert and cooperative, no distress.  Eyes: No scleral icterus is noted  HEENT: Mucous membranes are moist  Respiratory: Lungs are clear, no rales rhonchi or wheezes are heard  Cardiovascular: RRR without murmur  GI: Abdomen is obese, large ventral hernia noted in the left lower quadrant with some superficial areas where the skin was broken open.  He stated he bumped into a wood pile with his abdomen.  He has noticed sensation over this area.  He has no right upper quadrant tenderness or rebound.  Lymph/Hematologic: No palpable lymphadenopathy  Skin: No rashes but the open areas on his abdomen are as noted above  Musculoskeletal: No swelling or synovitis of any joints  Neurologic: No focal neurologic findings  Psychiatric: Affect is broad ranging and appropriate.  No formal thought disorder    Data   Data reviewed today:  I personally reviewed the abdominal CT image(s) showing No acute pathology .  Recent Labs   Lab 02/23/24  1659 02/23/24  1217 02/23/24  0933 02/20/24  2057   WBC  --   --  6.9 6.3   HGB  --   --  16.4 16.4   MCV  --   --  90 90   PLT  --   --  194 246   INR  --  1.28*  --   --    NA  --   --  133* 129*   POTASSIUM  --   --  4.0 3.8   CHLORIDE  --   --  96* 92*   CO2  --   --  25 26   BUN  --   --  11.8 19.4   CR  --   --  0.93 1.31*   ANIONGAP  --   --  12 11   BRENNEN  --   --  9.3 9.8   *  --  265* 260*   ALBUMIN  --   --  3.8 4.1   PROTTOTAL  --   --  7.4 7.8   BILITOTAL  --   --  0.6 0.3   ALKPHOS  --   --  244* 155*   ALT  --   --  2,733* 447*   AST  --   --   1,870* 473*   LIPASE  --   --   --  22       No results found for this or any previous visit (from the past 24 hour(s)).

## 2024-02-23 NOTE — ED PROVIDER NOTES
History     Chief Complaint   Patient presents with    Abdominal Pain    Abnormal Labs     HPI  Jaswant Chong is a 53 year old male who was seen in follow-up in clinic today.  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  He has a chronic large ventral wall hernia.  He was initially seen last week in the clinic in the emergency room.  CT imaging at that time was stable.  However, his LFTs were noted to be slightly elevated.  Hepatitis panel as well as CMV and EBV test were sent.  These all resulted negative.  At patient's follow-up today repeat labs were done his liver function tests are significantly more elevated with an ALT of 2733 and an AST of 1830.  Bilirubin 0.6.  No new medication changes.      Given that he is asplenic, he was started on Augmentin for his fever.  No source was identified for his fever.  His Tmax was up to 102 in the emergency room.  Blood cultures, urinalysis, chest x-ray and labs were otherwise reassuring and unremarkable at that time.    He was actually feeling so well he discharged from the clinic to home and was waiting for the call back.  He is now here in the emergency room for further evaluation.    Upon further questioning the patient and his wife it sounds as though his mood be been feeling a little bit off since mid December.  He travels for work.  He does not drink a significant amount of alcohol.  In fact his not really drink much alcohol at all and at least the last 2 months.  He has not felt up to it.    Allergies:  No Known Allergies    Problem List:    Patient Active Problem List    Diagnosis Date Noted    Abdominal pain, unspecified abdominal location 02/23/2024     Priority: Medium    Varicose veins of left lower extremity with pain 10/26/2022     Priority: Medium    Essential hypertension 03/23/2020     Priority: Medium    Morbid obesity (H) 01/02/2020      Priority: Medium    Ascending aortic aneurysm (H24) 01/02/2020     Priority: Medium    Dyspnea on exertion 01/02/2020     Priority: Medium    Chronic combined systolic and diastolic congestive heart failure (H) 12/10/2019     Priority: Medium    Status post coronary angiogram on 7/26/2018 with luminal irregularities 07/26/2018     Priority: Medium    Adenocarcinoma, 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  Involvement of the terminal ileum   with mucinous adenocarcinoma of the appendix by direct extension.      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     Priority: Medium    Recurrent ventral hernia 09/14/2015     Priority: Medium    Controlled substance agreement signed 08/13/2015     Priority: Medium    Major depressive disorder, recurrent episode, moderate (H) 08/13/2015     Priority: Medium    Degenerative joint disease (DJD) of hip 02/06/2015     Priority: Medium    Hernia 12/03/2012     Priority: Medium    Abdominal pain 02/16/2012     Priority: Medium    Diabetes mellitus without complication (H) 12/07/2011     Priority: Medium    Ventral hernia with obstruction 12/06/2011     Priority: Medium    Varicocele 05/03/2011     Priority: Medium    Malignant neoplasm of appendix vermiformis (H) 04/15/2011     Priority: Medium    Mixed hyperlipidemia 10/01/2010     Priority: Medium    Microalbuminuria 10/01/2010     Priority: Medium        Past Medical History:    Past Medical History:   Diagnosis Date    Malignant neoplasm of  appendix (H)     Type 2 diabetes mellitus without complications (H)     Ventral hernia without obstruction or gangrene     Vesicointestinal fistula        Past Surgical History:    Past Surgical History:   Procedure Laterality Date    COLON SURGERY      2006, Hemicolectomy with appendiceal cancer noted, mucinous type.    [facility] (LOCATION)      sebaceous, scalp    OTHER SURGICAL HISTORY      2007,OFF126,LYMPH NODE DISSECTION    OTHER SURGICAL HISTORY      03/08,,HERNIA REPAIR,Repair of surgical wound hernia, metastatic cancer incidentally noted.    OTHER SURGICAL HISTORY      04/08,30866.9,HX ABDOMEN PERITONEUM OMENTUM SURGERY,removal of left hemidiaphragm and omentum [Other] as well as intraperitoneal chemotherapy, 5 FU and Oxaliplatin for metastatic mucinous adenocarcinoma of the appendix.[[[    OTHER SURGICAL HISTORY      04/09,,HERNIA REPAIR, Ventral hernia repair, recurrent appendiceal carcinoma, resection of 7 centimeter left upper quadrant tumor with splenectomy, Surgeon, Dr. Marks    OTHER SURGICAL HISTORY      11/2001,,HERNIA REPAIR,Reduction of ventral hernia and mesh repair of hernia    OTHER SURGICAL HISTORY      12/2012,,HERNIA REPAIR,Removal mesh with infection 12/12    OTHER SURGICAL HISTORY      4/25/13,157886,OTHER,left thumb, Dr. Donald       Family History:    Family History   Problem Relation Age of Onset    Diabetes Father         Diabetes,type 2    Diabetes Paternal Grandmother         Diabetes,type 1       Social History:  Marital Status:   [2]  Social History     Tobacco Use    Smoking status: Never    Smokeless tobacco: Never   Vaping Use    Vaping Use: Never used   Substance Use Topics    Alcohol use: Yes     Alcohol/week: 0.0 standard drinks of alcohol     Comment: beer 2 times a month    Drug use: No        Medications:    amLODIPine (NORVASC) 10 MG tablet  amoxicillin-clavulanate (AUGMENTIN) 875-125 MG tablet  aspirin (ASA) 81 MG chewable  tablet  Blood Pressure KIT  carvedilol (COREG) 25 MG tablet  cyclobenzaprine (FLEXERIL) 10 MG tablet  Insulin Aspart FlexPen 100 UNIT/ML SOPN  insulin pen needle (BD VELIA U/F) 32G X 4 MM miscellaneous  liraglutide (VICTOZA) 18 MG/3ML solution  lisinopril (ZESTRIL) 40 MG tablet  oxyCODONE-acetaminophen (PERCOCET) 5-325 MG tablet  oxyCODONE-acetaminophen (PERCOCET) 5-325 MG tablet  oxyCODONE-acetaminophen (PERCOCET) 5-325 MG tablet  rosuvastatin (CRESTOR) 20 MG tablet  sildenafil (VIAGRA) 100 MG tablet  terazosin (HYTRIN) 2 MG capsule  traZODone (DESYREL) 50 MG tablet  venlafaxine (EFFEXOR XR) 150 MG 24 hr capsule          Review of Systems   Constitutional:  Positive for fever. Negative for chills.   Respiratory:  Negative for cough, choking, shortness of breath, wheezing and stridor.    Cardiovascular:  Negative for chest pain and leg swelling.   Gastrointestinal:  Positive for abdominal pain. Negative for constipation, diarrhea, nausea and vomiting.   Endocrine: Negative for polydipsia, polyphagia and polyuria.   Genitourinary:  Negative for dysuria.   Neurological:  Positive for weakness. Negative for dizziness, tremors, syncope and headaches.       Physical Exam   BP: (!) 201/130  Pulse: 77  Temp: 98  F (36.7  C)  Resp: 16  Weight: 134.7 kg (297 lb)  SpO2: 96 %      Physical Exam  Constitutional:       General: He is not in acute distress.     Appearance: Normal appearance. He is not toxic-appearing.   HENT:      Head: Atraumatic.   Eyes:      General: No scleral icterus.     Conjunctiva/sclera: Conjunctivae normal.   Cardiovascular:      Rate and Rhythm: Normal rate.      Heart sounds: Normal heart sounds.   Pulmonary:      Effort: Pulmonary effort is normal. No respiratory distress.      Breath sounds: Normal breath sounds.   Abdominal:      General: Abdomen is flat. A surgical scar is present. Bowel sounds are normal.      Palpations: Abdomen is soft. There is no shifting dullness, hepatomegaly or mass.       Tenderness: There is no abdominal tenderness.      Hernia: A hernia is present. Hernia is present in the ventral area.   Musculoskeletal:         General: No deformity.      Cervical back: Neck supple.   Skin:     General: Skin is warm.   Neurological:      Mental Status: He is alert.         ED Course              ED Course as of 02/23/24 1415   Fri Feb 23, 2024   1237 I saw patient earlier in the week for elevated LFTs and abdominal pain.  He returns with increasing LFTs.  Called to GI at [facility].  Awaiting callback     Procedures              Critical Care time:  none               Results for orders placed or performed during the hospital encounter of 02/23/24 (from the past 24 hour(s))   INR   Result Value Ref Range    INR 1.28 (H) 0.85 - 1.15   Extra Tube    Narrative    The following orders were created for panel order Extra Tube.  Procedure                               Abnormality         Status                     ---------                               -----------         ------                     Extra Red Top Tube[[id number]]                               Final result               Extra Serum Separator Tu...[[id number]]                      Final result                 Please view results for these tests on the individual orders.   Extra Red Top Tube   Result Value Ref Range    Hold Specimen JIC    Extra Serum Separator Tube (SST)   Result Value Ref Range    Hold Specimen JIC    Acetaminophen GH   Result Value Ref Range    Acetaminophen <5.0 (L) 10.0 - 30.0 ug/mL   Ammonia (on ice)   Result Value Ref Range    Ammonia 13 (L) 16 - 60 umol/L   Extra Tube    Narrative    The following orders were created for panel order Extra Tube.  Procedure                               Abnormality         Status                     ---------                               -----------         ------                     Extra Red Top Tube[[id number]]                               In process                 Extra Serum  Separator Tu...[[id number]]                      In process                 Extra Serum Separator Tu...[[id number]]                      In process                   Please view results for these tests on the individual orders.       Medications - No data to display    Assessments & Plan (with Medical Decision Making)     I have reviewed the nursing notes.    I have reviewed the findings, diagnosis, plan and need for follow up with the patient.           Medical Decision Making  The patient's presentation was of high complexity (a chronic illness severe exacerbation, progression, or side effect of treatment).    The patient's evaluation involved:  review of external note(s) from 3+ sources (see separate area of note for details)  review of 3+ test result(s) ordered prior to this encounter (see separate area of note for details)    The patient's management necessitated high risk (a decision regarding hospitalization).        New Prescriptions    No medications on file       Final diagnoses:   Abdominal pain, unspecified abdominal location   Elevated LFTs   Fever in adult   Asplenia   Patient had a fever earlier in the week.  He is asplenic.  He was started on Augmentin.  He is otherwise discontinued all his medications.  Despite that his liver function tests are continuing to elevate.  He had an extensive workup earlier this week including a CT of the abdomen and pelvis, CTA of the abdomen pelvis, CMV, EBV and hepatitis panel.  These have all returned negative or stable.  No clear explanation for his symptoms including his elevated LFTs at this time though autoimmune, infectious still possible.  Given his complex history recommend at least admission for observation overnight to trend liver enzymes.  Patient is agreeable to this.  If possible would recommend discontinuing the Augmentin and continuing to hold the rest of his medications as per discussion with GI to help exclude medication as cause of his  symptoms.    2/23/2024   [facility]       Nedra Gurrola DO  02/23/24 4779

## 2024-02-23 NOTE — PHARMACY-ADMISSION MEDICATION HISTORY
Pharmacist Admission Medication History    Admission medication history is complete. The information provided in this note is only as accurate as the sources available at the time of the update.    Information Source(s): Patient and CareEverywhere/SureScripts via in-person    Pertinent Information: Patient was very friendly but admitted to not always taking medications as prescribed. Is taking carvedilol once daily d/t insomnia experienced when taking it in the evening. Patient has not finished course of augmentin, has 5 pills left (2/25/24 stop date). Patient claims he was using Insulin aspart 25-30 units TID w/meals as sliding scale. Could not find any record of this, left insulin as 20 units TID will monitor glucose. Patient states he is not using terazosin but still has at home, wants to talk to his Dr about it as he does not think it is helping.      Changes made to PTA medication list:  Added: None  Deleted: Trazodone, sildenafil   Changed: Carvedilol BID --> every day     Allergies reviewed with patient: yes    Medication History Completed By: Shreyas Mills formerly [facility] 2/23/2024 4:14 PM    PTA Med List   Medication Sig Last Dose    amLODIPine (NORVASC) 10 MG tablet Take 1 tablet (10 mg) by mouth daily 2/20/2024 at AM    amoxicillin-clavulanate (AUGMENTIN) 875-125 MG tablet Take 1 tablet by mouth 2 times daily for 5 days 2/23/2024 at AM    aspirin (ASA) 81 MG chewable tablet Take 81 mg by mouth daily 2/20/2024 at AM    carvedilol (COREG) 25 MG tablet Take 25 mg by mouth daily 2/20/2024 at AM    cyclobenzaprine (FLEXERIL) 10 MG tablet Take 1 tablet (10 mg) by mouth 3 times daily as needed for muscle spasms 1/15/2024    Insulin Aspart FlexPen 100 UNIT/ML SOPN Inject 20 Units Subcutaneous 3 times daily (with meals) 2/20/2024    liraglutide (VICTOZA) 18 MG/3ML solution Inject 0.6 mg Subcutaneous daily 2/20/2024 at AM    lisinopril (ZESTRIL) 40 MG tablet TAKE 1 TABLET(40 MG) BY MOUTH DAILY 2/20/2024     oxyCODONE-acetaminophen (PERCOCET) 5-325 MG tablet Take 1 tablet by mouth every 6 hours as needed for pain 2/22/2024 at PM    rosuvastatin (CRESTOR) 20 MG tablet TAKE 1 TABLET(20 MG) BY MOUTH DAILY 2/20/2024 at AM    terazosin (HYTRIN) 2 MG capsule Take 1 capsule (2 mg) by mouth at bedtime More than a month    venlafaxine (EFFEXOR XR) 150 MG 24 hr capsule Take 1 capsule (150 mg) by mouth daily 2/20/2024

## 2024-02-23 NOTE — NURSING NOTE
"Patient presents to the clinic for ER follow up.    FOOD SECURITY SCREENING QUESTIONS:    The next two questions are to help us understand your food security.  If you are feeling you need any assistance in this area, we have resources available to support you today.    Hunger Vital Signs:  Within the past 12 months we worried whether our food would run out before we got money to buy more. Never  Within the past 12 months the food we bought just didn't last and we didn't have money to get more. Never    Advance Care Directive on file? no  Advance Care Directive provided to patient? Declined.      Chief Complaint   Patient presents with    Clinic Care Coordination - Follow-up     ER       Initial /86 (BP Location: Right arm, Patient Position: Sitting, Cuff Size: Adult Large)   Pulse 76   Temp 98.1  F (36.7  C) (Tympanic)   Resp 18   Ht 1.905 m (6' 3\")   Wt 134.7 kg (297 lb)   SpO2 95%   BMI 37.12 kg/m   Estimated body mass index is 37.12 kg/m  as calculated from the following:    Height as of this encounter: 1.905 m (6' 3\").    Weight as of this encounter: 134.7 kg (297 lb).  Medication Reconciliation: complete        Karla Don LPN     "

## 2024-02-23 NOTE — PROVIDER NOTIFICATION
02/23/24 [address] [place] [address]   Patient Belongings remains with patient   Patient Belongings Remaining with Patient cell phone/electronics;clothing;vision aids   Did you bring any home meds/supplements to the hospital?  No     A               Admission:  I am responsible for any personal items that are not sent to the safe or pharmacy.  [facility] is not responsible for loss, theft or damage of any property in my possession.    Signature:  _________________________________ Date: _______  Time: _____                                              Staff Signature:  ____________________________ Date: ________  Time: _____      2nd Staff person, if patient is unable/unwilling to sign:    Signature: ________________________________ Date: ________  Time: _____     Discharge:  [facility] has returned all of my personal belongings:    Signature: _________________________________ Date: ________  Time: _____                                          Staff Signature:  ____________________________ Date: ________  Time: _____

## 2024-02-24 VITALS
BODY MASS INDEX: 36.21 KG/M2 | TEMPERATURE: 98.6 F | HEART RATE: 75 BPM | DIASTOLIC BLOOD PRESSURE: 101 MMHG | WEIGHT: 289.7 LBS | SYSTOLIC BLOOD PRESSURE: 151 MMHG | OXYGEN SATURATION: 94 % | RESPIRATION RATE: 16 BRPM

## 2024-02-24 LAB
ALBUMIN SERPL BCG-MCNC: 3.5 G/DL (ref 3.5–5.2)
ALP SERPL-CCNC: 243 U/L (ref 40–150)
ALT SERPL W P-5'-P-CCNC: 1872 U/L (ref 0–70)
ANION GAP SERPL CALCULATED.3IONS-SCNC: 11 MMOL/L (ref 7–15)
AST SERPL W P-5'-P-CCNC: 739 U/L (ref 0–45)
BASOPHILS # BLD AUTO: ABNORMAL 10*3/UL
BASOPHILS # BLD MANUAL: 0 10E3/UL (ref 0–0.2)
BASOPHILS NFR BLD AUTO: ABNORMAL %
BASOPHILS NFR BLD MANUAL: 0 %
BILIRUB SERPL-MCNC: 0.7 MG/DL
BUN SERPL-MCNC: 13.5 MG/DL (ref 6–20)
CALCIUM SERPL-MCNC: 9.2 MG/DL (ref 8.6–10)
CHLORIDE SERPL-SCNC: 99 MMOL/L (ref 98–107)
CREAT SERPL-MCNC: 0.71 MG/DL (ref 0.67–1.17)
CRP SERPL-MCNC: 29.47 MG/L
DEPRECATED HCO3 PLAS-SCNC: 24 MMOL/L (ref 22–29)
EGFRCR SERPLBLD CKD-EPI 2021: >90 ML/MIN/1.73M2
EOSINOPHIL # BLD AUTO: ABNORMAL 10*3/UL
EOSINOPHIL # BLD MANUAL: 0.6 10E3/UL (ref 0–0.7)
EOSINOPHIL NFR BLD AUTO: ABNORMAL %
EOSINOPHIL NFR BLD MANUAL: 7 %
ERYTHROCYTE [DISTWIDTH] IN BLOOD BY AUTOMATED COUNT: 13.2 % (ref 10–15)
GGT SERPL-CCNC: 337 U/L (ref 8–61)
GLUCOSE BLDC GLUCOMTR-MCNC: 243 MG/DL (ref 70–99)
GLUCOSE BLDC GLUCOMTR-MCNC: 255 MG/DL (ref 70–99)
GLUCOSE SERPL-MCNC: 222 MG/DL (ref 70–99)
HBA1C MFR BLD: 12.9 % (ref 4–6.2)
HCT VFR BLD AUTO: 44.6 % (ref 40–53)
HGB BLD-MCNC: 15.6 G/DL (ref 13.3–17.7)
IMM GRANULOCYTES # BLD: ABNORMAL 10*3/UL
IMM GRANULOCYTES NFR BLD: ABNORMAL %
LYMPHOCYTES # BLD AUTO: ABNORMAL 10*3/UL
LYMPHOCYTES # BLD MANUAL: 4.4 10E3/UL (ref 0.8–5.3)
LYMPHOCYTES NFR BLD AUTO: ABNORMAL %
LYMPHOCYTES NFR BLD MANUAL: 51 %
MCH RBC QN AUTO: 31 PG (ref 26.5–33)
MCHC RBC AUTO-ENTMCNC: 35 G/DL (ref 31.5–36.5)
MCV RBC AUTO: 89 FL (ref 78–100)
MONOCYTES # BLD AUTO: ABNORMAL 10*3/UL
MONOCYTES # BLD MANUAL: 1 10E3/UL (ref 0–1.3)
MONOCYTES NFR BLD AUTO: ABNORMAL %
MONOCYTES NFR BLD AUTO: NEGATIVE %
MONOCYTES NFR BLD MANUAL: 11 %
NEUTROPHILS # BLD AUTO: ABNORMAL 10*3/UL
NEUTROPHILS # BLD MANUAL: 2.7 10E3/UL (ref 1.6–8.3)
NEUTROPHILS NFR BLD AUTO: ABNORMAL %
NEUTROPHILS NFR BLD MANUAL: 31 %
NRBC # BLD AUTO: 0 10E3/UL
NRBC BLD AUTO-RTO: 1 /100
PLAT MORPH BLD: ABNORMAL
PLATELET # BLD AUTO: 203 10E3/UL (ref 150–450)
POTASSIUM SERPL-SCNC: 3.4 MMOL/L (ref 3.4–5.3)
PROT SERPL-MCNC: 6.9 G/DL (ref 6.4–8.3)
RBC # BLD AUTO: 5.04 10E6/UL (ref 4.4–5.9)
RBC MORPH BLD: ABNORMAL
SODIUM SERPL-SCNC: 134 MMOL/L (ref 135–145)
VARIANT LYMPHS BLD QL SMEAR: PRESENT
WBC # BLD AUTO: 8.7 10E3/UL (ref 4–11)

## 2024-02-24 PROCEDURE — 86140 C-REACTIVE PROTEIN: CPT | Performed by: FAMILY MEDICINE

## 2024-02-24 PROCEDURE — 85027 COMPLETE CBC AUTOMATED: CPT | Performed by: FAMILY MEDICINE

## 2024-02-24 PROCEDURE — 82040 ASSAY OF SERUM ALBUMIN: CPT | Performed by: FAMILY MEDICINE

## 2024-02-24 PROCEDURE — 99239 HOSP IP/OBS DSCHRG MGMT >30: CPT | Performed by: FAMILY MEDICINE

## 2024-02-24 PROCEDURE — 36415 COLL VENOUS BLD VENIPUNCTURE: CPT | Performed by: FAMILY MEDICINE

## 2024-02-24 PROCEDURE — 85007 BL SMEAR W/DIFF WBC COUNT: CPT | Performed by: FAMILY MEDICINE

## 2024-02-24 PROCEDURE — 250N000013 HC RX MED GY IP 250 OP 250 PS 637: Performed by: FAMILY MEDICINE

## 2024-02-24 PROCEDURE — 83036 HEMOGLOBIN GLYCOSYLATED A1C: CPT | Performed by: FAMILY MEDICINE

## 2024-02-24 PROCEDURE — 86308 HETEROPHILE ANTIBODY SCREEN: CPT | Performed by: FAMILY MEDICINE

## 2024-02-24 RX ORDER — IBUPROFEN 600 MG/1
600 TABLET, FILM COATED ORAL EVERY 6 HOURS PRN
COMMUNITY
Start: 2024-02-24

## 2024-02-24 RX ADMIN — OXYCODONE HYDROCHLORIDE 10 MG: 5 TABLET ORAL at 02:16

## 2024-02-24 RX ADMIN — LISINOPRIL 40 MG: 40 TABLET ORAL at 09:17

## 2024-02-24 RX ADMIN — AMLODIPINE BESYLATE 10 MG: 10 TABLET ORAL at 09:12

## 2024-02-24 RX ADMIN — CARVEDILOL 25 MG: 12.5 TABLET, FILM COATED ORAL at 09:17

## 2024-02-24 RX ADMIN — INSULIN ASPART 3 UNITS: 100 INJECTION, SOLUTION INTRAVENOUS; SUBCUTANEOUS at 09:09

## 2024-02-24 RX ADMIN — VENLAFAXINE HYDROCHLORIDE 150 MG: 75 CAPSULE, EXTENDED RELEASE ORAL at 09:11

## 2024-02-24 ASSESSMENT — ACTIVITIES OF DAILY LIVING (ADL)
ADLS_ACUITY_SCORE: 20

## 2024-02-24 NOTE — PLAN OF CARE
Goal Outcome Evaluation:    Gave discharge instructions to patient and wife.  Answered questions.  Walked them out at 1250.

## 2024-02-24 NOTE — DISCHARGE SUMMARY
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  He has a chronic large ventral wall hernia in the left lower quadrant that is felt to be too difficult to repair because of the lack of his oblique muscles.  He has constant pain in this area that he rates most days of 5-6 out of 10 but he gets by with that and lives his life using small amounts of pain medicine from time to time.  He has a history of hypertension, type 2 diabetes.  He was seen in the emergency room on 2/20/2024 with increasing nausea vomiting and fever to 101.  He has had pain by his left upper rib cage and his chronic left lower quadrant abdominal pain seem to be significantly worsening.  He was concern for bowel obstruction with possibly something incarcerated in his hernia.  He came to the emergency room and was evaluated.  He had mildly elevated LFTs with an AST and ALT in the 400s.  Slight increase in creatinine and slightly low sodium but normal lipase lactate and CBC.  His multiplex was negative and CTA of the abdomen and pelvis with contrast was negative with no pathology identified.  There is no evidence of obstruction, his liver appeared normal.  He has a small stable right adrenal nodule and evidence of previous splenectomy.  Concern for his elevated liver function was  perhaps infectious versus medication versus autoimmune.  He is very noncompliant with his medications in general and has poor control of his diabetes.  It was felt that likely his etiology was viral and he was sent home on some Augmentin because of his asplenia with fever and follow-up was arranged for him in the clinic.     He was seen today by Emilia Whitfield PA-C in the clinic and his AST had bumped from 473 up to 1830 and his ALT went from 447 up to 2733.  These 2 values were normal in August 2023.  His hepatitis testing and EBV and CMV testing done in the emergency room all came back negative.  He was sent to the emergency room for further evaluation due to his elevated LFTs which were noted after he left the clinic.  He is actually feeling a little bit better today than he had been over the last several days.  The emergency room physician spoke to the on-call gastroenterologist at [facility].  It was suggested that he try and hold as many medications as possible.  They elected to hold his Augmentin.  He has not taken his blood pressure medicines or any medicines for the last 2 days.  His blood pressure is quite high as is his blood sugar.     Currently the patient feels better with regard to his left lower quadrant pain by his hernia but still notes it is a 6 out of 10 which she says is his baseline.  Really does not have any right upper quadrant tenderness.  And he says he feels better now than he has in the last week.    Hospital Course   Jaswant Chong was admitted on 2/23/2024.  Enzo was upped overnight.  His LFTs reduced significantly but were still quite elevated.  Since he was feeling better, it was felt that this could be followed as an outpatient.  Looking back over his recent course, 134 what may have happened is that he may have had diverticulitis causing his left lower quadrant pain and that when he was put on Augmentin that that actually helped that and he felt better.  He has not had any further  fevers and his pain is gone back to his baseline.  While rare, Augmentin can cause elevated liver function test.  We have ruled out hepatitis EBV and CMV.  Certainly medications could be playing a role but the only new medications would be Augmentin.  We sent off labs for autoimmune hepatitis testing and those will come back from the reference lab.    He was discharged home in improved condition.  He will take it easy today and tomorrow and then follow-up in the clinic on Monday with repeat liver function testing at that time.    Referral sent for diabetic education to consider getting him a continuous glucose monitor.    Naveed Tidwell MD    Significant Results and Procedures       Pending Results   These results will be followed up by PCP  Unresulted Labs Ordered in the Past 30 Days of this Admission       Date and Time Order Name Status Description    2/23/2024  3:39 PM DNA double stranded antibodies In process     2/23/2024  3:39 PM IgG In process     2/23/2024  3:39 PM Anti-SLA LP Antibody In process     2/23/2024  3:39 PM Liver kidney microsomal antibody IgG In process     2/23/2024  3:39 PM F Actin EIA with reflex In process     2/23/2024  1:08 PM Anti Nuclear Deepa IgG by IFA with Reflex In process     2/23/2024 12:34 PM GGT In process     2/21/2024 12:10 AM Blood Culture Hand, Left Preliminary     2/21/2024 12:10 AM Blood Culture Hand, Left Preliminary             Code Status   Full Code       Primary Care Physician   Mik Kumari    Physical Exam   Temp: 98.6  F (37  C) Temp src: Tympanic BP: (!) 151/101 Pulse: 68   Resp: 16 SpO2: 96 % O2 Device: None (Room air)    Vitals:    02/23/24 1150 02/24/24 0600   Weight: 134.7 kg (297 lb) 131.4 kg (289 lb 11.2 oz)     Vital Signs with Ranges  Temp:  [98  F (36.7  C)-98.6  F (37  C)] 98.6  F (37  C)  Pulse:  [68-82] 68  Resp:  [16] 16  BP: (147-203)/() 151/101  SpO2:  [94 %-97 %] 96 %  I/O last 3 completed shifts:  In: 240 [P.O.:240]  Out: -      Constitutional: Alert cooperative, no distress  Eyes: Sclera nonicteric  ENT: Mucous membranes moist  Respiratory: Lungs are clear  Cardiovascular: RRR without murmur  GI: Abdomen is soft with large ventral hernia protuberant.  Mild tenderness to palpation in the left lower quadrant which is his baseline.    Discharge Disposition   Discharged to home  Condition at discharge: Stable    Consultations This Hospital Stay   None    Time Spent on this Encounter   Naveed MATIAS MD, personally saw the patient today and spent greater than 30 minutes discharging this patient.    Discharge Orders      Anti Nuclear Deepa IgG by IFA with Reflex     Adult Diabetes Education  Referral      Reason for your hospital stay    Elevated LFTs     Follow-up and recommended labs and tests     Follow up with CASIE Payne at (location with clinic name or city) [facility], Monday 2/26/24 for hospital follow- up. The following labs/tests are recommended: Comp panel.     Activity    Your activity upon discharge: activity as tolerated     Diet    Follow this diet upon discharge: Orders Placed This Encounter      Consistent Carbohydrate Diet Moderate Consistent Carb (60 g CHO per Meal) Diet     Discharge Medications   Current Discharge Medication List        START taking these medications    Details   ibuprofen (ADVIL/MOTRIN) 600 MG tablet Take 1 tablet (600 mg) by mouth every 6 hours as needed for inflammatory pain    Associated Diagnoses: Lower abdominal pain; Diabetes mellitus without complication (H)           CONTINUE these medications which have NOT CHANGED    Details   amLODIPine (NORVASC) 10 MG tablet Take 1 tablet (10 mg) by mouth daily  Qty: 90 tablet, Refills: 4    Associated Diagnoses: Essential hypertension      aspirin (ASA) 81 MG chewable tablet Take 81 mg by mouth daily      carvedilol (COREG) 25 MG tablet Take 25 mg by mouth daily      cyclobenzaprine (FLEXERIL) 10 MG tablet Take 1  tablet (10 mg) by mouth 3 times daily as needed for muscle spasms  Qty: 60 tablet, Refills: 4    Associated Diagnoses: Malignant neoplasm of appendix vermiformis (H)      Insulin Aspart FlexPen 100 UNIT/ML SOPN Inject 20 Units Subcutaneous 3 times daily (with meals)  Qty: 45 mL, Refills: 0    Associated Diagnoses: Diabetes mellitus without complication (H)      liraglutide (VICTOZA) 18 MG/3ML solution Inject 0.6 mg Subcutaneous daily  Qty: 9 mL, Refills: 4    Associated Diagnoses: Type 2 diabetes mellitus with hyperglycemia, with long-term current use of insulin (H)      lisinopril (ZESTRIL) 40 MG tablet TAKE 1 TABLET(40 MG) BY MOUTH DAILY  Qty: 90 tablet, Refills: 3    Associated Diagnoses: HTN (hypertension), malignant      venlafaxine (EFFEXOR XR) 150 MG 24 hr capsule Take 1 capsule (150 mg) by mouth daily  Qty: 90 capsule, Refills: 4    Associated Diagnoses: Major depressive disorder, recurrent episode, moderate (H)      Blood Pressure KIT Blood pressure machine      insulin pen needle (BD VELIA U/F) 32G X 4 MM miscellaneous INJECTING THREE TIMES DAILY BEFORE MEALS  Qty: 300 each, Refills: 3    Associated Diagnoses: Diabetes mellitus without complication (H)           STOP taking these medications       amoxicillin-clavulanate (AUGMENTIN) 875-125 MG tablet Comments:   Reason for Stopping:         oxyCODONE-acetaminophen (PERCOCET) 5-325 MG tablet Comments:   Reason for Stopping:         oxyCODONE-acetaminophen (PERCOCET) 5-325 MG tablet Comments:   Reason for Stopping:         oxyCODONE-acetaminophen (PERCOCET) 5-325 MG tablet Comments:   Reason for Stopping:         rosuvastatin (CRESTOR) 20 MG tablet Comments:   Reason for Stopping:         terazosin (HYTRIN) 2 MG capsule Comments:   Reason for Stopping:             Allergies   No Known Allergies  Data   Most Recent 3 CBC's:  Recent Labs   Lab Test 02/24/24  0554 02/23/24  0933 02/20/24  2057   WBC 8.7 6.9 6.3   HGB 15.6 16.4 16.4   MCV 89 90 90    194  246      Most Recent 3 BMP's:  Recent Labs   Lab Test 02/24/24  0737 02/24/24  0554 02/23/24  2124 02/23/24  1659 02/23/24  0933 02/20/24  2057   NA  --  134*  --   --  133* 129*   POTASSIUM  --  3.4  --   --  4.0 3.8   CHLORIDE  --  99  --   --  96* 92*   CO2  --  24  --   --  25 26   BUN  --  13.5  --   --  11.8 19.4   CR  --  0.71  --   --  0.93 1.31*   ANIONGAP  --  11  --   --  12 11   BRENNEN  --  9.2  --   --  9.3 9.8   * 222* 204*   < > 265* 260*    < > = values in this interval not displayed.     Most Recent 2 LFT's:  Recent Labs   Lab Test 02/24/24  0554 02/23/24  0933   * 1,830*   ALT 1,872* 2,733*   ALKPHOS 243* 244*   BILITOTAL 0.7 0.6     Most Recent INR's and Anticoagulation Dosing History:  Anticoagulation Dose History          Latest Ref Rng & Units 2/23/2024   Recent Dosing and Labs   INR 0.85 - 1.15 1.28      Most Recent 3 Troponin's:No lab results found.  Most Recent Cholesterol Panel:  Recent Labs   Lab Test 08/15/23  1644   CHOL 182   LDL 93   HDL 34*   TRIG 274*     Most Recent 6 Bacteria Isolates From Any Culture (See EPIC Reports for Culture Details):No lab results found.  Most Recent TSH, T4 and A1c Labs:  Recent Labs   Lab Test 02/24/24  0554 03/30/23  1546 10/26/22  1022 12/17/20  1342 08/14/20  1332   TSH  --   --  0.58   < >  --    T4  --   --   --   --  0.84   A1C 12.9*   < > 10.2*   < > 12.0*    < > = values in this interval not displayed.     Results for orders placed or performed during the hospital encounter of 02/20/24   CTA Abdomen Pelvis with Contrast    Narrative    PROCEDURE INFORMATION:   Exam: CTA Abdomen and Pelvis With Contrast   Exam date and time: 2/20/2024 9:52 PM   Age: 53 years old   Clinical indication: Abdominal pain; Localized; Upper; Prior surgery; Surgery   date: 6+ months; Surgery type: Appendix cancer 2006, hernia repairs starting in   2001 and last one 2009. Cancer last treated [id number] per patient. Lymph node   dissection 2007. Mesh placed in 2008  became infected and was removed per   patient. ; Additional info: Abdominal pain, HX aaa     TECHNIQUE:   Imaging protocol: Computed tomographic angiography of the abdomen and pelvis   with contrast. Exam focused on the arteries.   3D rendering (Not supervised by radiologist): MIP and/or 3D reconstructed   images were created by the technologist.   Radiation optimization: All CT scans at this facility use at least one of these   dose optimization techniques: automated exposure control; mA and/or kV   adjustment per patient size (includes targeted exams where dose is matched to   clinical indication); or iterative reconstruction.   Contrast material: ISOVUE 370; Contrast volume: 100 ml; Contrast route:   INTRAVENOUS (IV);      COMPARISON:   1.   CT ABDOMEN PELVIS W CONTRAST 7/29/2022 2:54 PM   2.   CT ABDOMEN PELVIS W CONTRAST 8/25/2023 3:40 PM     FINDINGS:   Aorta: No aortic aneurysm. No aortic dissection.   Celiac trunk and mesenteric arteries: No occlusion or significant stenosis.   Renal arteries: No occlusion or significant stenosis.   Right iliac arteries: No occlusion or significant stenosis.   Left iliac arteries: No occlusion or significant stenosis.     Liver: No mass.   Gallbladder and bile ducts: Unremarkable. No calcified stones. No ductal   dilation.   Pancreas: Unremarkable. No mass. No ductal dilation.   Spleen: Possible partial splenectomy surgical changes stable from prior.   Adrenal glands: Small right adrenal gland nodule stable from comparison   measures 1.8 cm transverse diameter. Normal left adrenal gland.   Kidneys and ureters: Unremarkable. No solid mass. No hydronephrosis.   Stomach and bowel: Focal masslike opacity of proximal sigmoid colon anterior   wall region is nonspecific and stable from multiple comparison exams. Negative   for bowel obstruction or perforation. Negative for an acute inflammatory   changes of the bowel wall.   Appendix: Appendectomy.   Intraperitoneal space:  Unremarkable. No free air. No significant fluid   collection.   Lymph nodes: Unremarkable.  No change from prior.  No definite enlarged lymph   nodes.  Mild prominence of the naina hepatis lymph nodes stable from comparison.    Urinary bladder: Unremarkable. No mass.   Reproductive: Unremarkable as visualized.   Bones/joints: No acute fracture.   Soft tissues: Large complex ventral abdominal wall hernia redemonstrated.       Impression    IMPRESSION:   Negative CTA abdomen and pelvis. No acute pathology identified.     THIS DOCUMENT HAS BEEN ELECTRONICALLY SIGNED BY MIRELLA CANSECO MD   XR Chest 2 Views    Narrative    PROCEDURE INFORMATION:   Exam: XR Chest   Exam date and time: 2/20/2024 11:52 PM   Age: 53 years old   Clinical indication: Fever; Additional info: Fever unknown origin     TECHNIQUE:   Imaging protocol: Radiologic exam of the chest.   Views: 2 views.     COMPARISON:   CR XR CHEST 2 VIEWS 8/15/2023 4:02 PM     FINDINGS:   Lungs: Unremarkable. No consolidation.  Stable small right upper lobe granuloma.  Pleural spaces: Unremarkable. No pleural effusion. No pneumothorax.   Heart/Mediastinum: Unremarkable. No cardiomegaly.   Bones/joints: Unremarkable.       Impression    IMPRESSION:   No acute findings.     THIS DOCUMENT HAS BEEN ELECTRONICALLY SIGNED BY MIRELLA CANSECO MD

## 2024-02-24 NOTE — PHARMACY
[facility]  Part of [address] [address]    February 24, 2024    Dear Pharmacist,    Your customer, Jaswant Chong, born on 1970, was recently discharged from [facility].  We have updated his medication list and want to alert you to the following:       Review of your medicines        START taking        Dose / Directions   ibuprofen 600 MG tablet  Commonly known as: ADVIL/MOTRIN  Used for: Lower abdominal pain, Diabetes mellitus without complication (H)      Dose: 600 mg  Take 1 tablet (600 mg) by mouth every 6 hours as needed for inflammatory pain  Refills: 0            CONTINUE these medicines which have NOT CHANGED        Dose / Directions   amLODIPine 10 MG tablet  Commonly known as: NORVASC  Used for: Essential hypertension      Dose: 10 mg  Take 1 tablet (10 mg) by mouth daily  Quantity: 90 tablet  Refills: 4     aspirin 81 MG chewable tablet  Commonly known as: ASA      Dose: 81 mg  Take 81 mg by mouth daily  Refills: 0     BD VELIA U/F 32G X 4 MM insulin pen needle  Used for: Diabetes mellitus without complication (H)  Generic drug: insulin pen needle      INJECTING THREE TIMES DAILY BEFORE MEALS  Quantity: 300 each  Refills: 3     Blood Pressure Kit      Blood pressure machine  Refills: 0     carvedilol 25 MG tablet  Commonly known as: COREG      Dose: 25 mg  Take 25 mg by mouth daily  Refills: 0     cyclobenzaprine 10 MG tablet  Commonly known as: FLEXERIL  Used for: Malignant neoplasm of appendix vermiformis (H)      Dose: 10 mg  Take 1 tablet (10 mg) by mouth 3 times daily as needed for muscle spasms  Quantity: 60 tablet  Refills: 4     Insulin Aspart FlexPen 100 UNIT/ML Sopn  Used for: Diabetes mellitus without complication (H)      Dose: 20 Units  Inject 20 Units Subcutaneous 3 times daily (with meals)  Quantity: 45 mL  Refills: 0     liraglutide 18 MG/3ML solution  Commonly known as: VICTOZA  Used for: Type 2  diabetes mellitus with hyperglycemia, with long-term current use of insulin (H)      Dose: 0.6 mg  Inject 0.6 mg Subcutaneous daily  Quantity: 9 mL  Refills: 4     lisinopril 40 MG tablet  Commonly known as: ZESTRIL  Used for: HTN (hypertension), malignant      Dose: 40 mg  TAKE 1 TABLET(40 MG) BY MOUTH DAILY  Quantity: 90 tablet  Refills: 3     venlafaxine 150 MG 24 hr capsule  Commonly known as: EFFEXOR XR  Used for: Major depressive disorder, recurrent episode, moderate (H)      Dose: 150 mg  Take 1 capsule (150 mg) by mouth daily  Quantity: 90 capsule  Refills: 4            STOP taking      amoxicillin-clavulanate 875-125 MG tablet  Commonly known as: AUGMENTIN        oxyCODONE-acetaminophen 5-325 MG tablet  Commonly known as: PERCOCET        rosuvastatin 20 MG tablet  Commonly known as: CRESTOR        terazosin 2 MG capsule  Commonly known as: HYTRIN                  Where to get your medicines        Some of these will need a paper prescription and others can be bought over the counter. Ask your nurse if you have questions.    You don't need a prescription for these medications  ibuprofen 600 MG tablet         We also reviewed Jaswant Chong's allergy list and updated it as needed:  Allergies: Patient has no known allergies.    Thank you for continuing to care for Jaswant Chong.  We look forward to working together with you in the future.    Sincerely,  Shreyas Mills, [facility]

## 2024-02-24 NOTE — PHARMACY - DISCHARGE MEDICATION RECONCILIATION AND EDUCATION
Pharmacy:  Discharge Counseling and Medication Reconciliation    Jaswant Chong  [address] [facility]  [place] [address]  [phone] (home)   53 year old male  PCP: Mik Kumari    Allergies: Patient has no known allergies.    Discharge Counseling:    Pharmacist met with patient (and/or family) today to review the medication portion of the After Visit Summary (with an emphasis on NEW medications) and to address patient's questions/concerns.    Summary of Education: Discussed proper counseling points with patient regarding Ibuprofen use. Patient was instructed to take 600mg Ibuprofen per Dr Tidwell but not Rx was sent so counseled that they can use 200mg OTC tablets to reach 600mg dose.     On MedRec patient revealed they are taking carvedilol every day not BID d/t trouble sleeping. Educated patient on higher BP readings in the hospital and that ideally he should be taking is BID. Patient said he would try and was willing to talk to his Primary about a new HTN med if he could not tolerate.      Materials Provided:  MedCounselor sheets printed from Clinical Pharmacology on: Ibuprofen     Discharge Medication Reconciliation:    It has been determined that the patient has an adequate supply of medications available or which can be obtained from the patient's preferred pharmacy, which HE/SHE has confirmed as: [facility]. [An updated medication list will be faxed to the patient's pharmacy.]    Thank you for the consult.    Shreyas Mills [facility]........February 24, 2024 11:46 AM

## 2024-02-24 NOTE — PLAN OF CARE
Patient is alert and orientated x4. VSS. LS clear. Complains of abdominal pain at 6/10. PRN oxy given and effective per patient. Inguinal bulge, old surgical scars on abdomin. Blood glucose has been elevated. Insulin given. Ind in room.     Goal Outcome Evaluation:      Plan of Care Reviewed With: patient    Overall Patient Progress: improvingOverall Patient Progress: improving

## 2024-02-25 LAB
SMA IGG SER-ACNC: 11 UNITS
SOLUBLE LIVER IGG SER IA-ACNC: 1.8 U

## 2024-02-26 ENCOUNTER — OFFICE VISIT (OUTPATIENT)
Dept: FAMILY MEDICINE | Facility: OTHER | Age: 54
End: 2024-02-26
Attending: PHYSICIAN ASSISTANT
Payer: COMMERCIAL

## 2024-02-26 ENCOUNTER — PATIENT OUTREACH (OUTPATIENT)
Dept: FAMILY MEDICINE | Facility: OTHER | Age: 54
End: 2024-02-26
Payer: COMMERCIAL

## 2024-02-26 VITALS
TEMPERATURE: 96.7 F | DIASTOLIC BLOOD PRESSURE: 80 MMHG | OXYGEN SATURATION: 95 % | WEIGHT: 300 LBS | BODY MASS INDEX: 37.5 KG/M2 | RESPIRATION RATE: 16 BRPM | SYSTOLIC BLOOD PRESSURE: 118 MMHG | HEART RATE: 70 BPM

## 2024-02-26 DIAGNOSIS — Z90.81 POST-SPLENECTOMY: ICD-10-CM

## 2024-02-26 DIAGNOSIS — Z79.4 TYPE 2 DIABETES MELLITUS WITH HYPERGLYCEMIA, WITH LONG-TERM CURRENT USE OF INSULIN (H): ICD-10-CM

## 2024-02-26 DIAGNOSIS — E11.65 TYPE 2 DIABETES MELLITUS WITH HYPERGLYCEMIA, WITH LONG-TERM CURRENT USE OF INSULIN (H): ICD-10-CM

## 2024-02-26 DIAGNOSIS — R74.8 ELEVATED LIVER ENZYMES: ICD-10-CM

## 2024-02-26 DIAGNOSIS — K43.2 RECURRENT VENTRAL HERNIA: ICD-10-CM

## 2024-02-26 DIAGNOSIS — Z09 HOSPITAL DISCHARGE FOLLOW-UP: Primary | ICD-10-CM

## 2024-02-26 LAB
ALBUMIN SERPL BCG-MCNC: 3.6 G/DL (ref 3.5–5.2)
ALP SERPL-CCNC: 243 U/L (ref 40–150)
ALT SERPL W P-5'-P-CCNC: 791 U/L (ref 0–70)
ANION GAP SERPL CALCULATED.3IONS-SCNC: 13 MMOL/L (ref 7–15)
AST SERPL W P-5'-P-CCNC: 147 U/L (ref 0–45)
BACTERIA BLD CULT: NO GROWTH
BACTERIA BLD CULT: NO GROWTH
BILIRUB SERPL-MCNC: 0.7 MG/DL
BUN SERPL-MCNC: 24.5 MG/DL (ref 6–20)
CALCIUM SERPL-MCNC: 9.4 MG/DL (ref 8.6–10)
CHLORIDE SERPL-SCNC: 98 MMOL/L (ref 98–107)
CREAT SERPL-MCNC: 0.94 MG/DL (ref 0.67–1.17)
DEPRECATED HCO3 PLAS-SCNC: 22 MMOL/L (ref 22–29)
DSDNA AB SER-ACNC: 1.4 IU/ML
EGFRCR SERPLBLD CKD-EPI 2021: >90 ML/MIN/1.73M2
GLUCOSE SERPL-MCNC: 366 MG/DL (ref 70–99)
IGG SERPL-MCNC: 1057 MG/DL (ref 610–1616)
POTASSIUM SERPL-SCNC: 3.9 MMOL/L (ref 3.4–5.3)
PROT SERPL-MCNC: 6.8 G/DL (ref 6.4–8.3)
SODIUM SERPL-SCNC: 133 MMOL/L (ref 135–145)

## 2024-02-26 PROCEDURE — 99495 TRANSJ CARE MGMT MOD F2F 14D: CPT | Performed by: PHYSICIAN ASSISTANT

## 2024-02-26 PROCEDURE — 80053 COMPREHEN METABOLIC PANEL: CPT | Mod: ZL | Performed by: PHYSICIAN ASSISTANT

## 2024-02-26 PROCEDURE — 99496 TRANSJ CARE MGMT HIGH F2F 7D: CPT | Performed by: PHYSICIAN ASSISTANT

## 2024-02-26 PROCEDURE — 36415 COLL VENOUS BLD VENIPUNCTURE: CPT | Mod: ZL | Performed by: PHYSICIAN ASSISTANT

## 2024-02-26 PROCEDURE — G0463 HOSPITAL OUTPT CLINIC VISIT: HCPCS | Mod: 25

## 2024-02-26 RX ORDER — TRAMADOL HYDROCHLORIDE 50 MG/1
50 TABLET ORAL EVERY 6 HOURS PRN
Qty: 90 TABLET | Refills: 0 | Status: SHIPPED | OUTPATIENT
Start: 2024-02-26 | End: 2024-04-08

## 2024-02-26 ASSESSMENT — PATIENT HEALTH QUESTIONNAIRE - PHQ9
SUM OF ALL RESPONSES TO PHQ QUESTIONS 1-9: 7
10. IF YOU CHECKED OFF ANY PROBLEMS, HOW DIFFICULT HAVE THESE PROBLEMS MADE IT FOR YOU TO DO YOUR WORK, TAKE CARE OF THINGS AT HOME, OR GET ALONG WITH OTHER PEOPLE: SOMEWHAT DIFFICULT
SUM OF ALL RESPONSES TO PHQ QUESTIONS 1-9: 7

## 2024-02-26 ASSESSMENT — PAIN SCALES - GENERAL: PAINLEVEL: MODERATE PAIN (5)

## 2024-02-26 NOTE — TELEPHONE ENCOUNTER
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  Follow up needed: The following labs/tests are recommended: Comp panel.     Post Discharge Medication Reconciliation: discharge medications reconciled, continue medications without change.  Medications reviewed by: by myself    Problems taking medications regularly:  None  Problems adhering to non-medication therapy:  None    Other Healthcare Providers Involved in Patient s Care:         None  Update since discharge: improved.   Plan of care communicated with patient  Just a friendly reminder that you appointment is   Next 5 appointments (look out 90 days)      Feb 26, 2024  2:00 PM  (Arrive by 1:45 PM)  Office Visit with Emilia Whitfield PA-C  [facility] ([facility] ) [address]  [place] [id number] [phone]        .  We encourage you to keep this appointment.    Please remember to bring all of your pills in their bottles (including any vitamins or over the counter pills) with you to your appointment.    The patient indicates understanding of these issues and agrees with the plan of care.   Yes    Was the patient contacted within the 2 business days or other approved timeframe?  Yes  Was the Medication reconciliation and management done since the patient was discharged? Yes    Christine Flores RN  2/26/2024 8:17 AM

## 2024-02-26 NOTE — PROGRESS NOTES
Answers submitted by the patient for this visit:  Patient Health Questionnaire (Submitted on 2/26/2024)  If you checked off any problems, how difficult have these problems made it for you to do your work, take care of things at home, or get along with other people?: Somewhat difficult  PHQ9 TOTAL SCORE: 7    Assessment & Plan       ICD-10-CM    1. Hospital discharge follow-up  Z09       2. Elevated liver enzymes  R74.8 Comprehensive Metabolic Panel     Comprehensive Metabolic Panel      3. Recurrent ventral hernia  K43.2 traMADol (ULTRAM) 50 MG tablet      4. Type 2 diabetes mellitus with hyperglycemia, with long-term current use of insulin (H)  E11.65     Z79.4       5. Post-splenectomy  Z90.81         Hospital discharge follow-up, greatly improved since I saw patient last on 2/23/2024.  Elevated liver enzymes, these are downward trending, this is reassuring.  When I initially saw Enzo his AST was 1830 and ALT was 2733, upon discharge from the hospital  and ALT 1872.  Today, AST has decreased to 147 and , this is reassuring.  Recurrent ventral hernia, we reviewed for pain that the goal is to minimize hepatically excreted medications such as acetaminophen.  Was discharged home on ibuprofen from hospitalist team, recommend to keep under 8760-3876 mg daily.  We reviewed that oxycodone does have potential dose adjustment changes based off hepatic function.  Reviewed based off to up-to-date data, tramadol safer alternative for as needed use.  Reviewed risk, benefits mental side effects of medication.  PDMP reviewed.  Low risk for diversion.  Refill of tramadol sent through to pharmacy.  Type 2 diabetes, noncompliant, A1c of 12.9% during hospitalization course.  Reviewed the importance of tight monitoring of glycemic control with use of cold lifestyle modifications (diet and exercise) as well as insulin and Victoza medications as prescribed by PCP.  Discussed role of elevated blood sugars/hyperglycemia and  unmanaged diabetes on overall health.  Due to asplenia, patient keeps antibiotics on hand for travel and cases of fever.  He will be headed to [place] on Wednesday (2/28/2024), and would like to have a prescription on hand for travel in case of emergency.  I will reach out to PCP to discuss options such as cephalosporin (cefdinir) versus other medication class.  Fluoroquinolone would not be ideal due to ascending aortic aneurysm, type 2 diabetes (risk of hyperglycemia), history of malignant hypertension and CHF. PCP in agreement with Cefdinir. Rx sent to pharmacy.     MED REC REQUIRED  Post Medication Reconciliation Status: discharge medications reconciled, continue medications without change    See Patient Instructions    No follow-ups on file.    Ariane Giraldo is a 53 year old, presenting for the following health issues:  Hospital F/U        2/26/2024     1:53 PM   Additional Questions   Roomed by gus mireles lpn   Accompanied by signicant other     HPI     Enzo presents to the clinic today for hospital follow-up.    He initially presented to ER on 2/20/24 with concerns of abdominal pain x1 day, worsening. + fevers, constipation, vomiting/nausea. Concerns of bowel obstruction. LFTs elevated, normal CT. Negative multiplex. Started on Augmentin. Send out hepatitis panel. Lab work - negative hepatitis panel, EBV and CMV. LFT panel -  (previous 35),  (previously 36), Alk phos 155 (previously 142). Previous lab work comparison from 8/15/23.     Follows with Dr. Marks [facility]    I saw Enzo in clinic on 2/23/24 - liver enzymes continue to be elevated: AST 1830, ALT 2733 - transferred to ER. Admitted for observation. Elevation thought to be due to Augmentin.  - A1c 12.9.   - Auto immune work up in process. Awaiting JESSEE and liver kidney microsomal antibody panel. Otherwise - IgG, DsDNA and other rheumatologic/autoimmune lab workup is normal.    Today, 2/26/2024, Enzo states he feels  substantially better.  He declines any new fevers or chills.  Abdominal pain is improved.  No nausea, vomiting and or diarrhea.     Hospital Follow-up Visit:    Hospital/Nursing Home/IP Rehab Facility: [facility]  Date of Admission: 2-23-24  Date of Discharge: 2-24-24  Reason(s) for Admission: ABD pain and abnormal labs    Was your hospitalization related to COVID-19? No   Problems taking medications regularly:  None  Medication changes since discharge: Start: Ibuprofen  Stop: Augmentin, Percocet, Crestor and Hytrin  Problems adhering to non-medication therapy:  None    Summary of hospitalization:  [facility] discharge summary reviewed  Diagnostic Tests/Treatments reviewed.  Follow up needed: [facility]  Other Healthcare Providers Involved in Patient s Care:         None  Update since discharge: improved.       Plan of care communicated with patient and wife, Rachel           Review of Systems  Constitutional, HEENT, cardiovascular, pulmonary, GI, , musculoskeletal, neuro, skin, endocrine and psych systems are negative, except as otherwise noted.        Objective    /80 (BP Location: Right arm, Patient Position: Sitting, Cuff Size: Adult Large)   Pulse 70   Temp (!) 96.7  F (35.9  C) (Tympanic)   Resp 16   Wt 136.1 kg (300 lb)   SpO2 95%   BMI 37.50 kg/m    Body mass index is 37.5 kg/m .  Physical Exam   GENERAL: alert and no distress  EYES: Eyes grossly normal to inspection, PERRL and conjunctivae and sclerae normal  NECK: no adenopathy, no asymmetry, masses, or scars  RESP: lungs clear to auscultation - no rales, rhonchi or wheezes  CV: regular rate and rhythm, normal S1 S2, no S3 or S4, no murmur, click or rub, no peripheral edema  ABDOMEN: soft, nontender, bowel sounds normal, and large, protruding ventral hernia to the left lower quadrant  MS: no gross musculoskeletal defects noted, no edema  SKIN: no suspicious lesions or rashes  PSYCH: mentation appears normal,  affect normal/bright  LYMPH: normal ant/post cervical, supraclavicular nodes    Results for orders placed or performed in visit on 02/26/24   Comprehensive Metabolic Panel     Status: Abnormal   Result Value Ref Range    Sodium 133 (L) 135 - 145 mmol/L    Potassium 3.9 3.4 - 5.3 mmol/L    Carbon Dioxide (CO2) 22 22 - 29 mmol/L    Anion Gap 13 7 - 15 mmol/L    Urea Nitrogen 24.5 (H) 6.0 - 20.0 mg/dL    Creatinine 0.94 0.67 - 1.17 mg/dL    GFR Estimate >90 >60 mL/min/1.73m2    Calcium 9.4 8.6 - 10.0 mg/dL    Chloride 98 98 - 107 mmol/L    Glucose 366 (H) 70 - 99 mg/dL    Alkaline Phosphatase 243 (H) 40 - 150 U/L     (H) 0 - 45 U/L     (HH) 0 - 70 U/L    Protein Total 6.8 6.4 - 8.3 g/dL    Albumin 3.6 3.5 - 5.2 g/dL    Bilirubin Total 0.7 <=1.2 mg/dL     Signed Electronically by: Emilia Whitfield PA-C

## 2024-02-26 NOTE — NURSING NOTE
"Patient presents to the clinic for hospital follow up.    FOOD SECURITY SCREENING QUESTIONS:    The next two questions are to help us understand your food security.  If you are feeling you need any assistance in this area, we have resources available to support you today.    Hunger Vital Signs:  Within the past 12 months we worried whether our food would run out before we got money to buy more. Never  Within the past 12 months the food we bought just didn't last and we didn't have money to get more. Never    Advance Care Directive on file? no  Advance Care Directive provided to patient? Declined.      Chief Complaint   Patient presents with    Hospital F/U       Initial /80 (BP Location: Right arm, Patient Position: Sitting, Cuff Size: Adult Large)   Pulse 70   Temp (!) 96.7  F (35.9  C) (Tympanic)   Resp 16   Wt 136.1 kg (300 lb)   SpO2 95%   BMI 37.50 kg/m   Estimated body mass index is 37.5 kg/m  as calculated from the following:    Height as of 2/23/24: 1.905 m (6' 3\").    Weight as of this encounter: 136.1 kg (300 lb).  Medication Reconciliation: complete        Karla Don LPN     "
Detail Level: Detailed
Detail Level: Zone
Detail Level: Generalized
Detail Level: Simple

## 2024-02-27 ENCOUNTER — MYC MEDICAL ADVICE (OUTPATIENT)
Dept: FAMILY MEDICINE | Facility: OTHER | Age: 54
End: 2024-02-27
Payer: COMMERCIAL

## 2024-02-27 LAB
ANA SER QL IF: NEGATIVE
LKM AB TITR SER IF: NORMAL {TITER}

## 2024-02-27 RX ORDER — CEFDINIR 300 MG/1
300 CAPSULE ORAL 2 TIMES DAILY
Qty: 14 CAPSULE | Refills: 1 | Status: SHIPPED | OUTPATIENT
Start: 2024-02-27 | End: 2024-03-05

## 2024-02-27 NOTE — TELEPHONE ENCOUNTER
Cefdinir sent through as prophylaxis s/p splenectomy.     Emilia Whitfield PA-C  2/27/2024  6:49 AM

## 2024-03-24 ENCOUNTER — HEALTH MAINTENANCE LETTER (OUTPATIENT)
Age: 54
End: 2024-03-24

## 2024-04-04 ENCOUNTER — MYC MEDICAL ADVICE (OUTPATIENT)
Dept: FAMILY MEDICINE | Facility: OTHER | Age: 54
End: 2024-04-04
Payer: COMMERCIAL

## 2024-04-04 ENCOUNTER — MYC REFILL (OUTPATIENT)
Dept: FAMILY MEDICINE | Facility: OTHER | Age: 54
End: 2024-04-04
Payer: COMMERCIAL

## 2024-04-04 DIAGNOSIS — K43.2 RECURRENT VENTRAL HERNIA: ICD-10-CM

## 2024-04-04 RX ORDER — TRAMADOL HYDROCHLORIDE 50 MG/1
50 TABLET ORAL EVERY 6 HOURS PRN
Qty: 90 TABLET | Refills: 0 | Status: CANCELLED | OUTPATIENT
Start: 2024-04-04

## 2024-04-05 NOTE — TELEPHONE ENCOUNTER
Dr. Kumari,    Video for this ok if he's still in [place]?  Take a same-day of yours on Monday or have him see Emilia next week? (Virtual ok there?)  Emilia was the last one to see him on 2/26 for hospital follow up.      Lona Sabillon RN on 4/5/2024 at 10:58 AM

## 2024-04-05 NOTE — TELEPHONE ENCOUNTER
Called patient to offer them work-in with [facility] next week.  He can do a virtual visit next Monday at 10:20am.      Called [facility] to schedule.    Followed up on MyChart.     Lona Sabillon RN on 4/5/2024 at 12:15 PM

## 2024-04-08 ENCOUNTER — VIRTUAL VISIT (OUTPATIENT)
Dept: FAMILY MEDICINE | Facility: OTHER | Age: 54
End: 2024-04-08
Attending: FAMILY MEDICINE

## 2024-04-08 DIAGNOSIS — C18.1 MALIGNANT NEOPLASM OF APPENDIX VERMIFORMIS (H): Primary | ICD-10-CM

## 2024-04-08 DIAGNOSIS — K43.2 RECURRENT VENTRAL HERNIA: ICD-10-CM

## 2024-04-08 PROCEDURE — 99213 OFFICE O/P EST LOW 20 MIN: CPT | Mod: 95 | Performed by: FAMILY MEDICINE

## 2024-04-08 RX ORDER — OXYCODONE HYDROCHLORIDE 5 MG/1
5 TABLET ORAL EVERY 6 HOURS PRN
Qty: 60 TABLET | Refills: 0 | Status: SHIPPED | OUTPATIENT
Start: 2024-04-08 | End: 2024-09-20

## 2024-04-08 RX ORDER — TRAMADOL HYDROCHLORIDE 50 MG/1
50 TABLET ORAL EVERY 6 HOURS PRN
Qty: 90 TABLET | Refills: 5 | Status: SHIPPED | OUTPATIENT
Start: 2024-04-08 | End: 2024-09-23

## 2024-04-08 RX ORDER — TRAMADOL HYDROCHLORIDE 50 MG/1
50 TABLET ORAL EVERY 6 HOURS PRN
Qty: 90 TABLET | Refills: 0 | OUTPATIENT
Start: 2024-04-08

## 2024-04-08 NOTE — TELEPHONE ENCOUNTER
[facility] sent Rx request for the following:      Requested Prescriptions   Pending Prescriptions Disp Refills    traMADol (ULTRAM) 50 MG tablet 90 tablet 0     Sig: Take 1 tablet (50 mg) by mouth every 6 hours as needed for severe pain     Approved today. Pharmacy notified. Qiana Kay RN .............. 4/8/2024  11:23 AM

## 2024-04-08 NOTE — PROGRESS NOTES
Enzo is a 53 year old who is being evaluated via a billable video visit.    How would you like to obtain your AVS? MyChart  If the video visit is dropped, the invitation should be resent by: Send to e-mail at: [email or web address]  Will anyone else be joining your video visit? 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 Is due to sign an updated contract.  reviewed. Low risk for misuse or diversion.   Plan: traMADol (ULTRAM) 50 MG tablet, oxyCODONE         (ROXICODONE) 5 MG tablet             (K43.2) Recurrent ventral hernia  Comment:    Plan:                    No follow-ups on file.    Subjective   Enzo is a 53 year old, presenting for the following health issues:  Recheck Medication (Tramadol)      4/8/2024     9:46 AM   Additional Questions   Roomed by MELLY Dang   Accompanied by Self         4/8/2024     9:46 AM   Patient Reported Additional Medications   Patient reports taking the following new medications N/A     Via the Health Maintenance questionnaire, the patient has reported the following services have been completed -Eye Exam, this information has been sent to the abstraction team.  Video Start Time:  10:12    History of Present Illness       Reason for visit:  Check up    He eats 2-3 servings of fruits and vegetables daily.He consumes 0 sweetened beverage(s) daily.He exercises with enough effort to increase his heart rate 10 to 19 minutes per day.  He exercises with enough effort to increase his heart rate 5 days per week. He is missing 7 dose(s) of medications per week.  He is not taking prescribed medications regularly due to remembering to take.     He was given tramadol in Feb, due to his liver problems. Goal was to reduce the tylenol. Has been off percocet since then. He is worse with walking and has a work trip to [place]. Doing cold plunges. He would like to have the oxycodone for the more  "severe pain. Pain is from his cancer related abdomen surgery and hernia complications.                 Objective    Vitals - Patient Reported  Weight (Patient Reported): 130.2 kg (287 lb)  Height (Patient Reported): 190.5 cm (6' 3\")  BMI (Based on Pt Reported Ht/Wt): 35.87  Pain Score: Severe Pain (6)        Physical Exam   GENERAL: alert and no distress  EYES: Eyes grossly normal to inspection.  No discharge or erythema, or obvious scleral/conjunctival abnormalities.  RESP: No audible wheeze, cough, or visible cyanosis.    SKIN: Visible skin clear. No significant rash, abnormal pigmentation or lesions.  NEURO: Cranial nerves grossly intact.  Mentation and speech appropriate for age.  PSYCH: Appropriate affect, tone, and pace of words          Video-Visit Details    Type of service:  Video Visit   Video End Time:10:42 AM  Originating Location (pt. Location): Home    Distant Location (provider location):  On-site  Platform used for Video Visit: Betty  Signed Electronically by: Mik Kumari MD    "

## 2024-04-08 NOTE — TELEPHONE ENCOUNTER
traMADol (ULTRAM) 50 MG tablet 90 tablet 5 4/8/2024 -- No   Sig - Route: Take 1 tablet (50 mg) by mouth every 6 hours as needed for severe pain - Oral   Sent to pharmacy as: traMADol HCl 50 MG Oral Tablet (ULTRAM)   Class: E-Prescribe   Order: [id number]   E-Prescribing Status: Receipt confirmed by pharmacy (4/8/2024 10:31 AM CDT)   No prior authorization was found for this prescription.   Found prior authorization for another prescription for the same medication: Payer Waiting for Response     [id number] - [place], [place] - [address]     Pt informed of prescription via [email or web address]. Qiana Kay RN .............. 4/8/2024  11:24 AM

## 2024-04-08 NOTE — NURSING NOTE
"Chief Complaint   Patient presents with    Recheck Medication     Tramadol       Initial There were no vitals taken for this visit. Estimated body mass index is 37.5 kg/m  as calculated from the following:    Height as of 2/23/24: 1.905 m (6' 3\").    Weight as of 2/26/24: 136.1 kg (300 lb).  Medication Reconciliation: complete          "

## 2024-06-14 DIAGNOSIS — E11.9 DIABETES MELLITUS WITHOUT COMPLICATION (H): ICD-10-CM

## 2024-06-20 NOTE — TELEPHONE ENCOUNTER
[facility] sent Rx request for the following:      Requested Prescriptions   Pending Prescriptions Disp Refills    Insulin Aspart FlexPen 100 UNIT/ML SOPN 45 mL 0     Sig: Inject 20 Units Subcutaneous 3 times daily (with meals)       Insulin Protocol Failed - 6/20/2024  3:02 PM        Failed - Chart Review Required     Review Chart.    Do not approve if insulin is used in a pump.  Instead, direct refill request to the patient's endocrinologist.  If the patient doesn't have an endocrinologist, then send the refill to the patient's PCP for review              Last Prescription Date:   1/4/24  Last Fill Qty/Refills:         45 ml, R-0    Last Office Visit:              2/26/24   Future Office visit:           none    Unable to complete prescription refill per RN Medication Refill Policy.   Routing to provider to review and respond.  Ophelia Escalante RN on 6/20/2024 at 3:03 PM

## 2024-06-21 RX ORDER — INSULIN ASPART 100 [IU]/ML
20 INJECTION, SOLUTION INTRAVENOUS; SUBCUTANEOUS
Qty: 45 ML | Refills: 4 | Status: SHIPPED | OUTPATIENT
Start: 2024-06-21

## 2024-07-16 RX ORDER — INSULIN ASPART 100 [IU]/ML
20 INJECTION, SOLUTION INTRAVENOUS; SUBCUTANEOUS
Qty: 15 ML | OUTPATIENT
Start: 2024-07-16

## 2024-07-16 NOTE — TELEPHONE ENCOUNTER
[facility] sent Rx request for the following:      Requested Prescriptions   Pending Prescriptions Disp Refills    insulin aspart (NOVOLOG FLEXPEN) 100 UNIT/ML pen 15 mL      Sig: Inject 20 Units subcutaneously 3 times daily (with meals)       Insulin Protocol Failed - 7/16/2024  8:11 AM   Last Prescription Date:   6/21/24  Last Fill Qty/Refills:         45 ml, R-4    Last Office Visit:              2/26/24   Future Office visit:            none  Redundant refill request refused: Too soon:  Emilia Mclain RN on 7/16/2024 at 8:12 AM

## 2024-08-12 ENCOUNTER — MYC MEDICAL ADVICE (OUTPATIENT)
Dept: FAMILY MEDICINE | Facility: OTHER | Age: 54
End: 2024-08-12

## 2024-08-12 DIAGNOSIS — K43.2 RECURRENT VENTRAL HERNIA: Primary | ICD-10-CM

## 2024-08-12 DIAGNOSIS — Z79.4 TYPE 2 DIABETES MELLITUS WITH HYPERGLYCEMIA, WITH LONG-TERM CURRENT USE OF INSULIN (H): ICD-10-CM

## 2024-08-12 DIAGNOSIS — E66.01 MORBID OBESITY (H): ICD-10-CM

## 2024-08-12 DIAGNOSIS — E11.65 TYPE 2 DIABETES MELLITUS WITH HYPERGLYCEMIA, WITH LONG-TERM CURRENT USE OF INSULIN (H): ICD-10-CM

## 2024-08-12 RX ORDER — CYCLOBENZAPRINE HCL 5 MG
5 TABLET ORAL 3 TIMES DAILY PRN
Qty: 30 TABLET | Refills: 0 | Status: SHIPPED | OUTPATIENT
Start: 2024-08-12 | End: 2024-09-23

## 2024-09-11 DIAGNOSIS — E11.9 DIABETES MELLITUS WITHOUT COMPLICATION (H): ICD-10-CM

## 2024-09-12 RX ORDER — PEN NEEDLE, DIABETIC 32GX 5/32"
NEEDLE, DISPOSABLE MISCELLANEOUS
Qty: 300 EACH | Refills: 0 | Status: SHIPPED | OUTPATIENT
Start: 2024-09-12

## 2024-09-12 NOTE — TELEPHONE ENCOUNTER
[facility] sent Rx request for the following:      Requested Prescriptions   Pending Prescriptions Disp Refills    BD PEN NEEDLE VELIA 2ND GEN 32G X 4 MM miscellaneous [Pharmacy Med Name: B-D VELIA 2ND GEN PEN NDL [id number]]       Sig: USE TO INJECT INSULIN THREE TIMES DAILY     Last Prescription Date:   10/26/22  Last Fill Qty/Refills:         300, R-3    Last Office Visit:                4/8/24 (VV)  2/26/24 (hospital follow up)     Future Office visit:           None    Prescription refilled per RN Medication Refill Policy.................... Qiana Kay RN ....................  9/12/2024   3:50 PM

## 2024-09-16 NOTE — TELEPHONE ENCOUNTER
"Abdominal Pain.  Patient states:  \"I'm sure from massive hernia\".      Taking Tramadol (2) every morning and Aleve throughout day followed by Tramadol again for \"sleep\" (which doesn't help, per patient).     Patient asks \"any other options for pain\"?     Pain agreement expried on 7/14/2023.      4/8/2024  LOV with [facility] Virtual Visit    No future OV's scheduled.      Lona Sabillon RN on 9/16/2024 at 8:31 AM    "

## 2024-09-17 NOTE — TELEPHONE ENCOUNTER
Called [facility] to schedule.    Followed up on MyChart.     Lona Sabillon RN on 9/17/2024 at 1:50 PM

## 2024-09-17 NOTE — TELEPHONE ENCOUNTER
He is very complicated. Likely will need to do opiates again. See if he can come in, using one of my same day spots in the next week or so. Offer video if he cannot make it into the clinic.

## 2024-09-23 ENCOUNTER — VIRTUAL VISIT (OUTPATIENT)
Dept: FAMILY MEDICINE | Facility: OTHER | Age: 54
End: 2024-09-23
Attending: FAMILY MEDICINE
Payer: COMMERCIAL

## 2024-09-23 DIAGNOSIS — C18.1 ADENOCARCINOMA, APPENDIX (H): ICD-10-CM

## 2024-09-23 DIAGNOSIS — Z79.4 TYPE 2 DIABETES MELLITUS WITH HYPERGLYCEMIA, WITH LONG-TERM CURRENT USE OF INSULIN (H): Primary | ICD-10-CM

## 2024-09-23 DIAGNOSIS — C18.1 MALIGNANT NEOPLASM OF APPENDIX VERMIFORMIS (H): ICD-10-CM

## 2024-09-23 DIAGNOSIS — E11.65 TYPE 2 DIABETES MELLITUS WITH HYPERGLYCEMIA, WITH LONG-TERM CURRENT USE OF INSULIN (H): Primary | ICD-10-CM

## 2024-09-23 PROCEDURE — 99214 OFFICE O/P EST MOD 30 MIN: CPT | Mod: 95 | Performed by: FAMILY MEDICINE

## 2024-09-23 RX ORDER — OXYCODONE HYDROCHLORIDE 5 MG/1
5 TABLET ORAL EVERY 6 HOURS PRN
Qty: 90 TABLET | Refills: 0 | Status: SHIPPED | OUTPATIENT
Start: 2024-09-23

## 2024-09-23 RX ORDER — TRAMADOL HYDROCHLORIDE 50 MG/1
50 TABLET ORAL EVERY 6 HOURS PRN
Qty: 90 TABLET | Refills: 5 | Status: SHIPPED | OUTPATIENT
Start: 2024-09-23

## 2024-09-23 ASSESSMENT — PATIENT HEALTH QUESTIONNAIRE - PHQ9
10. IF YOU CHECKED OFF ANY PROBLEMS, HOW DIFFICULT HAVE THESE PROBLEMS MADE IT FOR YOU TO DO YOUR WORK, TAKE CARE OF THINGS AT HOME, OR GET ALONG WITH OTHER PEOPLE: NOT DIFFICULT AT ALL
SUM OF ALL RESPONSES TO PHQ QUESTIONS 1-9: 7
SUM OF ALL RESPONSES TO PHQ QUESTIONS 1-9: 7

## 2024-09-23 ASSESSMENT — PAIN SCALES - PAIN ENJOYMENT GENERAL ACTIVITY SCALE (PEG)
INTERFERED_GENERAL_ACTIVITY: 10
PEG_TOTALSCORE: 9.67
AVG_PAIN_PASTWEEK: 9
INTERFERED_ENJOYMENT_LIFE: 10
PEG_TOTALSCORE: 9.67

## 2024-09-23 NOTE — LETTER

## 2024-09-23 NOTE — PROGRESS NOTES
"Enzo is a 54 year old who is being evaluated via a billable video visit.    What phone number would you like to be contacted at? [phone]  How [facility] your [facility]? [facility]      Assessment & Plan     (E11.65,  Z79.4) Type 2 diabetes mellitus with hyperglycemia, with long-term current use of insulin (H)  (primary encounter diagnosis)  Comment: poor control, not on all previously prescribed meds for several reasons. 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 Will add on oxycodone for his more active days,  reviewed, and low risk for misuse or abuse.   Plan: oxyCODONE (ROXICODONE) 5 MG tablet, traMADol         (ULTRAM) 50 MG tablet        Chronic pain due to several surgeries from his cancer. Some of this might now be coming form his spine, and he would eventually like to see if a nerve block might help. He does not currently have the time to do a work up due to work, so added on oxycodone. Has been on this off and on over the years, and has been able to easily stop it when no longer needed.  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           BMI  Estimated body mass index is 37.5 kg/m  as calculated from the following:    Height as of 2/23/24: 1.905 m (6' 3\").    Weight as of 2/26/24: 136.1 kg (300 lb).             No follow-ups on file.    Subjective   Enzo is a 54 year old, presenting for the following health issues:  Recheck Medication      9/23/2024     8:53 AM   Additional Questions   Roomed by MELLY Dang   Accompanied by Self         9/23/2024     8:53 AM   Patient Reported " Additional Medications   Patient reports taking the following new medications N/A     History of Present Illness       Back Pain:  He presents for follow up of back pain. Patient's back pain is a chronic problem.  Location of back pain:  Right lower back, left lower back, right middle of back and left middle of back  Description of back pain: burning, sharp and stabbing  Back pain spreads: nowhere    Since patient first noticed back pain, pain is: gradually worsening  Does back pain interfere with his job:  Yes       Diabetes:   He presents for follow up of diabetes.  He is checking home blood glucose a few times a month.   He checks blood glucose after meals.  Blood glucose is sometimes over 200 and never under 70.  When his blood glucose is low, the patient is asymptomatic for confusion, blurred vision, lethargy and reports not feeling dizzy, shaky, or weak.  He is concerned about other.   He is having numbness in feet and burning in feet.  The patient has not had a diabetic eye exam in the last 12 months.          He eats 2-3 servings of fruits and vegetables daily.He consumes 1 sweetened beverage(s) daily.He exercises with enough effort to increase his heart rate 60 or more minutes per day.  He exercises with enough effort to increase his heart rate 4 days per week.   He is taking medications regularly.     Trying to follow a diabetes diet. Has lost a little weight. Walking 6 miles daily now. No side effects from the diabetes meds  at this point. Was on victoza, but insurance changed. Ozempic co pay was too much. Also was on Jardiance, and again co pay was excessive.     Recent Labs   Lab Test 02/26/24  1446 02/24/24  0554 02/23/24  0933 02/20/24  2057 08/15/23  1644 03/30/23  1546 10/26/22  1022 07/14/22  1542 07/14/22  1542 05/26/22  0928 06/25/21  1046 12/17/20  1342   A1C  --  12.9*  --   --  12.9* 11.0* 10.2*  --   --    < > 13.4* 13.5*   LDL  --   --   --   --  93  --   --   --  158*  --  159*  --    HDL   --   --   --   --  34*  --   --   --  36  --  34  --    TRIG  --   --   --   --  274*  --   --   --  234*  --  297*  --    * 1,872* 2,733*   < > 36 28  --   --  21   < >  --  34   CR 0.94 0.71 0.93   < > 1.29* 0.78  --   --  1.02   < > 1.07 0.97   GFRESTIMATED >90 >90 >90   < > 66 >90  --   --  89   < > 73 82   GFRESTBLACK  --   --   --   --   --   --   --   --   --   --  89 >90   POTASSIUM 3.9 3.4 4.0   < > 4.9 4.3  --    < > 4.3   < > 3.7 4.5   TSH  --   --   --   --   --   --  0.58  --  0.87  --   --   --     < > = values in this interval not displayed.              He is a , and fell in the woods last weekend. This happens about once a month in the fall. He now cannot roll over in bed without sharp pain in the right oblique. Hoping to look at a nerve ablation in November. Some pain in right chest and rib also with rolling over. A month ago was swimming in a pool for just a few minutes, really flared his pain. He had been taking oxycodone at one point for his cancer related pain. It helps more than the tramadol. Using advil also.   Pain History:  When did you first notice your pain? Chronic   Have you seen this provider for your pain in the past? Yes   Where in your body do you have pain? Abdomen, back  Are you seeing anyone else for your pain? No        2/23/2024     8:30 AM 2/26/2024     1:49 PM 9/23/2024     8:35 AM   PHQ-9 SCORE   PHQ-9 Total Score MyChart 10 (Moderate depression) 7 (Mild depression) 7 (Mild depression)   PHQ-9 Total Score 10 7 7             9/23/2024     8:55 AM   PEG Score   PEG Total Score 7           9/23/2024     8:55 AM   PEG Score   PEG Total Score 7       Chronic Pain Follow Up:    Location of pain: back and abd  Analgesia/pain control:    - Recent changes:  worsening    - Overall control: Inadequate pain control    - Current treatments: tramadol, NSAIDS, movement    Adherence:     - Do you ever take more pain medicine than prescribed? No    - When did you take  your last dose of pain medicine?  This morning   Adverse effects: No   PDMP Review         Value Time User    State PDMP site checked  Yes 4/8/2024 10:22 AM Mik Kumari MD          Last CSA Agreement:   CSA -- Patient Level:     [Media Unavailable] Controlled Substance Agreement - Opioid - Scan on 7/15/2022 11:31 AM: Opioid   [Media Unavailable] Controlled Substance Agreement - Opioid - Scan on 6/28/2021  8:45 AM: Opioid Agreement       Last UDS: 6/29/2021                        Objective    Vitals - Patient Reported  Weight (Patient Reported): 133.8 kg (295 lb)  Pain Score: Extreme Pain (8)        Physical Exam   GENERAL: alert and no distress  EYES: Eyes grossly normal to inspection.  No discharge or erythema, or obvious scleral/conjunctival abnormalities.  RESP: No audible wheeze, cough, or visible cyanosis.    SKIN: Visible skin clear. No significant rash, abnormal pigmentation or lesions.  NEURO: Cranial nerves grossly intact.  Mentation and speech appropriate for age.  PSYCH: Appropriate affect, tone, and pace of words          Video-Visit Details    Type of service:  Video Visit   Originating Location (pt. Location): Home    Distant Location (provider location):  On-site  Platform used for Video Visit: Betty  Signed Electronically by: Mik Kumari MD

## 2024-10-03 DIAGNOSIS — I10 HTN (HYPERTENSION), MALIGNANT: ICD-10-CM

## 2024-10-03 DIAGNOSIS — I10 ESSENTIAL HYPERTENSION: ICD-10-CM

## 2024-10-08 RX ORDER — AMLODIPINE BESYLATE 10 MG/1
10 TABLET ORAL DAILY
Qty: 90 TABLET | Refills: 0 | Status: SHIPPED | OUTPATIENT
Start: 2024-10-08

## 2024-10-08 RX ORDER — LISINOPRIL 40 MG/1
40 TABLET ORAL DAILY
Qty: 90 TABLET | Refills: 0 | Status: SHIPPED | OUTPATIENT
Start: 2024-10-08

## 2024-10-08 NOTE — TELEPHONE ENCOUNTER
[facility] sent Rx request for the following:      Requested Prescriptions   Pending Prescriptions Disp Refills    lisinopril (ZESTRIL) 40 MG tablet [Pharmacy Med Name: LISINOPRIL 40MG TABLETS] 90 tablet 3     Sig: TAKE 1 TABLET(40 MG) BY MOUTH DAILY   Last Prescription Date:   9/29/23  Last Fill Qty/Refills:         90, R-3        amLODIPine (NORVASC) 10 MG tablet [Pharmacy Med Name: AMLODIPINE BESYLATE 10MG TABLETS] 90 tablet 4     Sig: TAKE 1 TABLET(10 MG) BY MOUTH DAILY   Last Prescription Date:   8/15/23  Last Fill Qty/Refills:         90, R-4      Last Office Visit:                9/23/24 (VV)  2/26/24 (hospital discharge follow up)    Future Office visit:           None    Unable to complete prescription refill per RN Medication Refill Policy. Qiana Kay RN .............. 10/8/2024  11:10 AM

## 2024-10-11 ENCOUNTER — PATIENT OUTREACH (OUTPATIENT)
Dept: FAMILY MEDICINE | Facility: OTHER | Age: 54
End: 2024-10-11
Payer: COMMERCIAL

## 2024-10-11 NOTE — TELEPHONE ENCOUNTER
Patient Quality Outreach    Patient is due for the following:   Diabetes -  labs. Orders were already placed by PCP.    Next Steps:   Schedule a lab only visit for diabetes labs    Type of outreach:    Message sent to outreach to schedule lab only visit for diabetes labs.    Questions for provider review:    None           Alexandra Castro RN

## 2024-10-20 ENCOUNTER — HEALTH MAINTENANCE LETTER (OUTPATIENT)
Age: 54
End: 2024-10-20

## 2024-10-29 ENCOUNTER — OFFICE VISIT (OUTPATIENT)
Dept: FAMILY MEDICINE | Facility: OTHER | Age: 54
End: 2024-10-29
Attending: NURSE PRACTITIONER
Payer: COMMERCIAL

## 2024-10-29 VITALS
OXYGEN SATURATION: 97 % | DIASTOLIC BLOOD PRESSURE: 90 MMHG | TEMPERATURE: 97.8 F | BODY MASS INDEX: 38.32 KG/M2 | RESPIRATION RATE: 18 BRPM | HEIGHT: 75 IN | SYSTOLIC BLOOD PRESSURE: 162 MMHG | HEART RATE: 60 BPM | WEIGHT: 308.2 LBS

## 2024-10-29 DIAGNOSIS — M54.6 ACUTE RIGHT-SIDED THORACIC BACK PAIN: Primary | ICD-10-CM

## 2024-10-29 DIAGNOSIS — K43.9 VENTRAL HERNIA WITHOUT OBSTRUCTION OR GANGRENE: ICD-10-CM

## 2024-10-29 DIAGNOSIS — C18.1 ADENOCARCINOMA, APPENDIX (H): ICD-10-CM

## 2024-10-29 PROCEDURE — 99213 OFFICE O/P EST LOW 20 MIN: CPT | Performed by: FAMILY MEDICINE

## 2024-10-29 ASSESSMENT — PAIN SCALES - GENERAL: PAINLEVEL_OUTOF10: SEVERE PAIN (7)

## 2024-10-29 NOTE — NURSING NOTE
"Chief Complaint   Patient presents with    Back Pain   Patient presents to the rapid clinic today for concerns of lower right back pain. Patient states this has been going on for the past few weeks. Patient has tried treating at home with Tramadol and Ibuprofen. Patient states he had surgery and they cut both oblique muscles. Surgery was 15 years ago.     Initial BP (!) 162/90 (BP Location: Left arm, Patient Position: Sitting, Cuff Size: Adult Large)   Pulse 60   Temp 97.8  F (36.6  C) (Temporal)   Resp 18   Ht 1.905 m (6' 3\")   Wt 139.8 kg (308 lb 3.2 oz)   SpO2 97%   BMI 38.52 kg/m   Estimated body mass index is 38.52 kg/m  as calculated from the following:    Height as of this encounter: 1.905 m (6' 3\").    Weight as of this encounter: 139.8 kg (308 lb 3.2 oz).  Medication Review: complete    The next two questions are to help us understand your food security.  If you are feeling you need any assistance in this area, we have resources available to support you today.          10/29/2024   SDOH- Food Insecurity   Within the past 12 months, did you worry that your food would run out before you got money to buy more? N   Within the past 12 months, did the food you bought just not last and you didn t have money to get more? N              Health Care Directive:  Patient does not have a Health Care Directive: Discussed advance care planning with patient; however, patient declined at this time.    Tashi Beauchamp      "

## 2024-10-29 NOTE — PROGRESS NOTES
"    (M54.6) Acute right-sided thoracic back pain  (primary encounter diagnosis)  Comment:     More of a subacute issue.  Suspicious for a thoracic radiculopathy.  He has been contemplating some type of ablation procedure.    Plan:   He will discuss with his personal doctor.  Workup would probably be an MRI of this area.    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      HISTORY    Patient has had symptoms similar to above for at least a month.  It seems to be bothering him more this last month.  Pain is positional, worse with deep breath and sometimes with twisting.    He has some anatomical distortions with a large left ventral hernia and some previous muscle take down near his back.  He has been battling an appendiceal malignancy since about 2008.      REVIEW OF SYSTEMS    No fever or chills.  No cough or SOB.  No vomiting or diarrhea.  No hematuria.        EXAM  BP (!) 162/90 (BP Location: Left arm, Patient Position: Sitting, Cuff Size: Adult Large)   Pulse 60   Temp 97.8  F (36.6  C) (Temporal)   Resp 18   Ht 1.905 m (6' 3\")   Wt 139.8 kg (308 lb 3.2 oz)   SpO2 97%   BMI 38.52 kg/m      Large man with noticeable protruding left-sided abdominal hernia.  Lungs are clear although deep breath aggravates his pain.  Not particularly tender to palpation right back and lower right ribs.  Abdomen nontender.  "

## 2024-10-31 ENCOUNTER — OFFICE VISIT (OUTPATIENT)
Dept: FAMILY MEDICINE | Facility: OTHER | Age: 54
End: 2024-10-31
Attending: FAMILY MEDICINE
Payer: COMMERCIAL

## 2024-10-31 ENCOUNTER — HOSPITAL ENCOUNTER (OUTPATIENT)
Dept: GENERAL RADIOLOGY | Facility: OTHER | Age: 54
Discharge: HOME | End: 2024-10-31
Attending: FAMILY MEDICINE
Payer: COMMERCIAL

## 2024-10-31 VITALS
BODY MASS INDEX: 38.74 KG/M2 | WEIGHT: 311.6 LBS | HEIGHT: 75 IN | TEMPERATURE: 97.9 F | SYSTOLIC BLOOD PRESSURE: 138 MMHG | OXYGEN SATURATION: 97 % | RESPIRATION RATE: 16 BRPM | HEART RATE: 58 BPM | DIASTOLIC BLOOD PRESSURE: 80 MMHG

## 2024-10-31 DIAGNOSIS — M54.6 PAIN IN THORACIC SPINE: ICD-10-CM

## 2024-10-31 DIAGNOSIS — M47.24 OSTEOARTHRITIS OF SPINE WITH RADICULOPATHY, THORACIC REGION: Primary | ICD-10-CM

## 2024-10-31 PROCEDURE — 72072 X-RAY EXAM THORAC SPINE 3VWS: CPT

## 2024-10-31 PROCEDURE — 90673 RIV3 VACCINE NO PRESERV IM: CPT | Performed by: FAMILY MEDICINE

## 2024-10-31 PROCEDURE — 90471 IMMUNIZATION ADMIN: CPT | Performed by: FAMILY MEDICINE

## 2024-10-31 PROCEDURE — 99213 OFFICE O/P EST LOW 20 MIN: CPT | Mod: 25 | Performed by: FAMILY MEDICINE

## 2024-10-31 RX ORDER — PREDNISONE 20 MG/1
20 TABLET ORAL 2 TIMES DAILY
Qty: 10 TABLET | Refills: 0 | Status: SHIPPED | OUTPATIENT
Start: 2024-10-31 | End: 2024-11-05

## 2024-10-31 ASSESSMENT — PATIENT HEALTH QUESTIONNAIRE - PHQ9
10. IF YOU CHECKED OFF ANY PROBLEMS, HOW DIFFICULT HAVE THESE PROBLEMS MADE IT FOR YOU TO DO YOUR WORK, TAKE CARE OF THINGS AT HOME, OR GET ALONG WITH OTHER PEOPLE: NOT DIFFICULT AT ALL
SUM OF ALL RESPONSES TO PHQ QUESTIONS 1-9: 2
SUM OF ALL RESPONSES TO PHQ QUESTIONS 1-9: 2

## 2024-10-31 ASSESSMENT — PAIN SCALES - GENERAL: PAINLEVEL_OUTOF10: SEVERE PAIN (7)

## 2024-10-31 NOTE — NURSING NOTE
"Chief Complaint   Patient presents with    Pain     In the back       Initial BP (!) 144/92 (BP Location: Right arm, Patient Position: Sitting, Cuff Size: Adult Large)   Pulse 58   Temp 97.9  F (36.6  C) (Temporal)   Resp 16   Ht 1.905 m (6' 3\")   Wt 141.3 kg (311 lb 9.6 oz)   SpO2 97%   BMI 38.95 kg/m   Estimated body mass index is 38.95 kg/m  as calculated from the following:    Height as of this encounter: 1.905 m (6' 3\").    Weight as of this encounter: 141.3 kg (311 lb 9.6 oz).  Medication Review: complete    The next two questions are to help us understand your food security.  If you are feeling you need any assistance in this area, we have resources available to support you today.          10/29/2024   SDOH- Food Insecurity   Within the past 12 months, did you worry that your food would run out before you got money to buy more? N   Within the past 12 months, did the food you bought just not last and you didn t have money to get more? N            Health Care Directive:  Patient does not have a Health Care Directive: Discussed advance care planning with patient; however, patient declined at this time.    Rylee Lea      "

## 2024-10-31 NOTE — PROGRESS NOTES
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 Has likely had this building over the past several years. Failed pain meds, including tramadol and NSAIDS. Would be very willing to pursue an interventional radiology procedure, like spinal injection. Will order an MRI next in preparation for this. For pain relief, burst of prednisone given for 5 days.   Plan: predniSONE (DELTASONE) 20 MG tablet, MR         Thoracic Spine w/o Contrast             (M54.6) Pain in thoracic spine  Comment:    Plan: XR Thoracic Spine 3 Views                   BMI  Estimated body mass index is 38.95 kg/m  as calculated from the following:    Height as of this encounter: 1.905 m (6' 3\").    Weight as of this encounter: 141.3 kg (311 lb 9.6 oz).             No follow-ups on file.    Ariane Giraldo is a 54 year old, presenting for the following health issues:  Pain (In the back)      10/31/2024     2:19 PM   Additional Questions   Roomed by Rylee PEREZ     Pain    History of Present Illness       Back Pain:  He presents for follow up of back pain. Patient's back pain is a chronic problem.  Location of back pain:  Right middle of back and right upper back  Description of back pain: burning, sharp, shooting and stabbing  Back pain spreads: nowhere    Since patient first noticed back pain, pain is: rapidly worsening  Does back pain interfere with his job:  Yes      He is taking medications regularly.     He has really had this for perhaps 2 months. Steadily progressing. Is a  and unable to do that for the last 10 days. Pain with breathing, wrapping into the ruq area. No rash. Was seen in  for this recently. Felt to be a thoracic spine source there. He has had appendix cancer, with significant ventral hernia from the treatment. This is all also causing more pain, with rolling " "over in bed. Feels like his hernia has shifted with his midline surgical incision now deviating more to the right. Cannot stand for more than 10 minutes or so now. Has flexeril, not using due to sedation.                   Objective    BP (!) 144/92 (BP Location: Right arm, Patient Position: Sitting, Cuff Size: Adult Large)   Pulse 58   Temp 97.9  F (36.6  C) (Temporal)   Resp 16   Ht 1.905 m (6' 3\")   Wt 141.3 kg (311 lb 9.6 oz)   SpO2 97%   BMI 38.95 kg/m    Body mass index is 38.95 kg/m .  Physical Exam   GENERAL: alert and no distress  RESP: lungs clear to auscultation - no rales, rhonchi or wheezes  CV: regular rate and rhythm, normal S1 S2, no S3 or S4, no murmur, click or rub, no peripheral edema   ABDOMEN: large left ventral hernia, perhaps 30 cm in diameter. Non tender.   PSYCH: mentation appears normal, affect normal/bright    Diabetic Foot Screen:  Any complaints of increased pain or numbness ? No  Is there a foot ulcer now or a history of foot ulcer? No  Does the foot have an abnormal shape? No  Are the nails thick, too long or ingrown? No  Are there any redness or open areas? No         Sensation Testing done at all points on the diagram with monofilament     Right Foot: Sensation Normal at all points  Left Foot: Sensation Normal at all points     Risk Category: 0- No loss of protective sensation  Performed by Mik Kumari MD    Results for orders placed or performed during the hospital encounter of 10/31/24   XR Thoracic Spine 3 Views     Status: None    Narrative    PROCEDURE: XR THORACIC SPINE 3 VIEWS 10/31/2024 2:54 PM    HISTORY: Pain in thoracic spine    COMPARISONS: None.    TECHNIQUE: AP and lateral    FINDINGS: The thoracic vertebral bodies and arches are intact.  Thoracic discs are normal in height. There are bridging osteophytes  seen in the mid and lower thoracic spine. Paravertebral soft tissues  appear normal.         Impression    IMPRESSION: Degenerative changes of the thoracic " spine    DAIANA GUTHRIE MD         SYSTEM ID:  [id number]             Signed Electronically by: Mik Kumari MD

## 2024-11-07 ENCOUNTER — MYC MEDICAL ADVICE (OUTPATIENT)
Dept: FAMILY MEDICINE | Facility: OTHER | Age: 54
End: 2024-11-07
Payer: COMMERCIAL

## 2024-11-07 DIAGNOSIS — F33.1 MAJOR DEPRESSIVE DISORDER, RECURRENT EPISODE, MODERATE (H): ICD-10-CM

## 2024-11-12 NOTE — TELEPHONE ENCOUNTER
Can you look into either retrying the MRI request, or getting the prior auth? I believe he has met the criteria they asked for in their rejection.

## 2024-11-12 NOTE — TELEPHONE ENCOUNTER
"Dr. Kumari,    \"Can you look into either retrying the MRI request, or getting the prior auth? I believe he has met the criteria they asked for in their rejection.\"    Has he completed 6 weeks of PT?  That is why the rejection for the MRI.  I'm not sure they will have anything else to add here?  These \"appeals\" are not done like med appeals so I wouldn't be able to help with that- I just call Bryon's insurance person to give them any updates on conservative treatment tried, therapy, etc.  If you feel he is physically unable to do PT- you could addend your 10/31 note to include this- and we can try.      Lona Sabillon RN on 11/12/2024 at 12:09 PM    "

## 2024-11-13 DIAGNOSIS — I10 HTN (HYPERTENSION), MALIGNANT: ICD-10-CM

## 2024-11-13 DIAGNOSIS — I10 ESSENTIAL HYPERTENSION: ICD-10-CM

## 2024-11-13 NOTE — TELEPHONE ENCOUNTER
He needs an MRI, ut his insurance rejected because he needs 6 weeks of physician directed treatment first. I have no way to get around this that I know of. I agree with asking Emely for insight.

## 2024-11-13 NOTE — TELEPHONE ENCOUNTER
Dr. Kumari,    From what I can tell, his 10/31 visit with you was the first visit discussing this with you (at least for a considerable amount of time) so I don't see any way around this one.     Emely said that in order for MRI to be ordered- 6 weeks of provider directed treatment would be (in red):     Physician Directed Treatment:    Trained in home exercises  (well documented in medical record that he is complying/doing them).  [facility] stated this is a hard one to prove- which is why PT is normally ordered.     Then follow-up visit with provider in 6 weeks.    Unless there is a definitive reason (in clinic notes) that states that patient would not be able to do PT or a home exercise program.    OR PT with follow-up in clinic in 6 weeks.     _______________________________________________________________________________    10/31/2024  MRI Thoracic Spine Ordered.     Called [facility] to talk about denial.      Physician Directed Treatment:    Trained in home exercises  (well documented in medical record that he is complying/doing them).  [facility] stated this is a hard one to prove- which is why PT is normally ordered.     Then follow-up visit with provider in 6 weeks.    OR PT with follow-up in clinic in 6 weeks.      Lona Sbaillon RN on 11/13/2024 at 2:30 PM

## 2024-11-14 RX ORDER — LISINOPRIL 40 MG/1
40 TABLET ORAL DAILY
Qty: 90 TABLET | Refills: 0 | OUTPATIENT
Start: 2024-11-14

## 2024-11-14 RX ORDER — AMLODIPINE BESYLATE 10 MG/1
10 TABLET ORAL DAILY
Qty: 90 TABLET | Refills: 0 | OUTPATIENT
Start: 2024-11-14

## 2024-11-14 NOTE — TELEPHONE ENCOUNTER
Dr. Kumari,    Patient requesting Tramadol and Percocet for trip.  As well as multiple other meds (one historical, 2 not on active med list).     I'm thinking we should have him try to come in and see someone for medication update before he leaves??  Let me know if you want me to himanshu up everything he is requesting instead.      Tramadol order form 24 should bring him to March- but I think he is asking for a big supply to take on his trip (contact his pharmacy to figure this out?)    Does he need to be seen in clinic for an Oxy refill before he goes?  Pain agreement is .     Also Requesting:     Venlafaxine  Carvedilol (historically reported- discontinued on 24- hospitalization)  Terazosin (not on active med list- discontinued on 2024 by Emilia).   Prednisone? (Not on active med list- last prescribed 10/31/24)    10/31/24  LOV with Kenyetta for pain.     No Annual Wellness Visit ever done?   ______________________________________________________________________________    Tramadol ordered 24 for #90/5.     Oxycodone ordered 24 for #90/0.     Lona Sabillon RN on 2024 at 4:19 PM

## 2024-11-14 NOTE — TELEPHONE ENCOUNTER
[facility] sent Rx request for the following:      Redundant refill request refused: Too soon:  amLODIPine (NORVASC) 10 MG tablet 90 tablet 0 10/8/2024 -- No   Sig - Route: TAKE 1 TABLET(10 MG) BY MOUTH DAILY - Oral   Sent to pharmacy as: amLODIPine Besylate 10 MG Oral Tablet (NORVASC)   Class: E-Prescribe   Order: [id number]   E-Prescribing Status: Receipt confirmed by pharmacy (10/8/2024  2:24 PM CDT)     lisinopril (ZESTRIL) 40 MG tablet 90 tablet 0 10/8/2024 -- No   Sig - Route: TAKE 1 TABLET(40 MG) BY MOUTH DAILY - Oral   Sent to pharmacy as: Lisinopril 40 MG Oral Tablet (ZESTRIL)   Class: E-Prescribe   Order: [id number]   E-Prescribing Status: Receipt confirmed by pharmacy (10/8/2024  2:24 PM CDT)     [id number] - [place], [place] - [address]     Qiana Kay RN .............. 11/14/2024  3:54 PM

## 2024-11-15 RX ORDER — VENLAFAXINE HYDROCHLORIDE 150 MG/1
150 CAPSULE, EXTENDED RELEASE ORAL DAILY
Qty: 90 CAPSULE | Refills: 4 | Status: CANCELLED | OUTPATIENT
Start: 2024-11-15

## 2024-11-15 RX ORDER — CARVEDILOL 25 MG/1
25 TABLET ORAL DAILY
Status: CANCELLED | OUTPATIENT
Start: 2024-11-15

## 2024-11-15 NOTE — TELEPHONE ENCOUNTER
Called [facility] to schedule OV with Molly next week for physical before he leaves for [place] next Sunday.     Followed up on MyChart.     Lona Sabillon RN on 11/15/2024 at 10:27 AM

## 2024-11-15 NOTE — TELEPHONE ENCOUNTER
We should get him worked in for an annual visit since he needs multiple medication refills. If he is not able to get in with  before his trip I am happy to work him in.

## 2024-11-20 ENCOUNTER — MYC MEDICAL ADVICE (OUTPATIENT)
Dept: INTERNAL MEDICINE | Facility: OTHER | Age: 54
End: 2024-11-20

## 2024-11-20 ENCOUNTER — OFFICE VISIT (OUTPATIENT)
Dept: INTERNAL MEDICINE | Facility: OTHER | Age: 54
End: 2024-11-20
Payer: COMMERCIAL

## 2024-11-20 VITALS
SYSTOLIC BLOOD PRESSURE: 130 MMHG | WEIGHT: 305 LBS | OXYGEN SATURATION: 99 % | BODY MASS INDEX: 37.92 KG/M2 | RESPIRATION RATE: 16 BRPM | HEIGHT: 75 IN | HEART RATE: 60 BPM | TEMPERATURE: 97.6 F | DIASTOLIC BLOOD PRESSURE: 60 MMHG

## 2024-11-20 DIAGNOSIS — I10 HTN (HYPERTENSION), MALIGNANT: ICD-10-CM

## 2024-11-20 DIAGNOSIS — E78.2 MIXED HYPERLIPIDEMIA: ICD-10-CM

## 2024-11-20 DIAGNOSIS — I10 ESSENTIAL HYPERTENSION: ICD-10-CM

## 2024-11-20 DIAGNOSIS — I71.21 ANEURYSM OF ASCENDING AORTA WITHOUT RUPTURE (H): ICD-10-CM

## 2024-11-20 DIAGNOSIS — L08.9 LOCAL INFECTION OF SKIN AND SUBCUTANEOUS TISSUE: ICD-10-CM

## 2024-11-20 DIAGNOSIS — Z23 NEED FOR PNEUMOCOCCAL 20-VALENT CONJUGATE VACCINATION: ICD-10-CM

## 2024-11-20 DIAGNOSIS — Z79.4 TYPE 2 DIABETES MELLITUS WITH HYPERGLYCEMIA, WITH LONG-TERM CURRENT USE OF INSULIN (H): ICD-10-CM

## 2024-11-20 DIAGNOSIS — E11.65 TYPE 2 DIABETES MELLITUS WITH HYPERGLYCEMIA, WITH LONG-TERM CURRENT USE OF INSULIN (H): ICD-10-CM

## 2024-11-20 DIAGNOSIS — C18.1 ADENOCARCINOMA, APPENDIX (H): ICD-10-CM

## 2024-11-20 DIAGNOSIS — E66.01 MORBID OBESITY (H): ICD-10-CM

## 2024-11-20 DIAGNOSIS — Z71.85 VACCINE COUNSELING: ICD-10-CM

## 2024-11-20 DIAGNOSIS — Z23 NEED FOR COVID-19 VACCINE: ICD-10-CM

## 2024-11-20 DIAGNOSIS — Z79.4 TYPE 2 DIABETES MELLITUS WITH OTHER SPECIFIED COMPLICATION, WITH LONG-TERM CURRENT USE OF INSULIN (H): ICD-10-CM

## 2024-11-20 DIAGNOSIS — I50.42 CHRONIC COMBINED SYSTOLIC AND DIASTOLIC CONGESTIVE HEART FAILURE (H): ICD-10-CM

## 2024-11-20 DIAGNOSIS — Z12.5 ENCOUNTER FOR SCREENING FOR MALIGNANT NEOPLASM OF PROSTATE: ICD-10-CM

## 2024-11-20 DIAGNOSIS — E11.9 DIABETES MELLITUS WITHOUT COMPLICATION (H): ICD-10-CM

## 2024-11-20 DIAGNOSIS — Z00.00 ROUTINE GENERAL MEDICAL EXAMINATION AT A HEALTH CARE FACILITY: Primary | ICD-10-CM

## 2024-11-20 DIAGNOSIS — C18.1 MALIGNANT NEOPLASM OF APPENDIX VERMIFORMIS (H): ICD-10-CM

## 2024-11-20 DIAGNOSIS — E27.8 ADRENAL MASS, RIGHT (H): ICD-10-CM

## 2024-11-20 DIAGNOSIS — F33.1 MAJOR DEPRESSIVE DISORDER, RECURRENT EPISODE, MODERATE (H): ICD-10-CM

## 2024-11-20 DIAGNOSIS — Z12.11 COLON CANCER SCREENING: ICD-10-CM

## 2024-11-20 DIAGNOSIS — E11.69 TYPE 2 DIABETES MELLITUS WITH OTHER SPECIFIED COMPLICATION, WITH LONG-TERM CURRENT USE OF INSULIN (H): ICD-10-CM

## 2024-11-20 LAB
ALBUMIN SERPL BCG-MCNC: 4.3 G/DL (ref 3.5–5.2)
ALP SERPL-CCNC: 137 U/L (ref 40–150)
ALT SERPL W P-5'-P-CCNC: 36 U/L (ref 0–70)
ANION GAP SERPL CALCULATED.3IONS-SCNC: 10 MMOL/L (ref 7–15)
AST SERPL W P-5'-P-CCNC: 26 U/L (ref 0–45)
BASOPHILS # BLD AUTO: 0.1 10E3/UL (ref 0–0.2)
BASOPHILS NFR BLD AUTO: 1 %
BILIRUB SERPL-MCNC: 0.4 MG/DL
BUN SERPL-MCNC: 13.7 MG/DL (ref 6–20)
CALCIUM SERPL-MCNC: 9.9 MG/DL (ref 8.8–10.4)
CHLORIDE SERPL-SCNC: 99 MMOL/L (ref 98–107)
CHOLEST SERPL-MCNC: 231 MG/DL
CREAT SERPL-MCNC: 0.97 MG/DL (ref 0.67–1.17)
CREAT UR-MCNC: 78 MG/DL
EGFRCR SERPLBLD CKD-EPI 2021: >90 ML/MIN/1.73M2
EOSINOPHIL # BLD AUTO: 0.2 10E3/UL (ref 0–0.7)
EOSINOPHIL NFR BLD AUTO: 2 %
ERYTHROCYTE [DISTWIDTH] IN BLOOD BY AUTOMATED COUNT: 12.7 % (ref 10–15)
EST. AVERAGE GLUCOSE BLD GHB EST-MCNC: 283 MG/DL
FASTING STATUS PATIENT QL REPORTED: YES
FASTING STATUS PATIENT QL REPORTED: YES
GLUCOSE SERPL-MCNC: 186 MG/DL (ref 70–99)
HBA1C MFR BLD: 11.5 %
HCO3 SERPL-SCNC: 28 MMOL/L (ref 22–29)
HCT VFR BLD AUTO: 46 % (ref 40–53)
HDLC SERPL-MCNC: 39 MG/DL
HGB BLD-MCNC: 15.7 G/DL (ref 13.3–17.7)
IMM GRANULOCYTES # BLD: 0 10E3/UL
IMM GRANULOCYTES NFR BLD: 0 %
LDLC SERPL CALC-MCNC: 144 MG/DL
LYMPHOCYTES # BLD AUTO: 2.8 10E3/UL (ref 0.8–5.3)
LYMPHOCYTES NFR BLD AUTO: 28 %
MCH RBC QN AUTO: 32.3 PG (ref 26.5–33)
MCHC RBC AUTO-ENTMCNC: 34.1 G/DL (ref 31.5–36.5)
MCV RBC AUTO: 95 FL (ref 78–100)
MICROALBUMIN UR-MCNC: 289.4 MG/L
MICROALBUMIN/CREAT UR: 371.03 MG/G CR (ref 0–17)
MONOCYTES # BLD AUTO: 0.8 10E3/UL (ref 0–1.3)
MONOCYTES NFR BLD AUTO: 8 %
NEUTROPHILS # BLD AUTO: 6.1 10E3/UL (ref 1.6–8.3)
NEUTROPHILS NFR BLD AUTO: 61 %
NONHDLC SERPL-MCNC: 192 MG/DL
NRBC # BLD AUTO: 0 10E3/UL
NRBC BLD AUTO-RTO: 0 /100
PLATELET # BLD AUTO: 271 10E3/UL (ref 150–450)
POTASSIUM SERPL-SCNC: 4.5 MMOL/L (ref 3.4–5.3)
PROT SERPL-MCNC: 7.7 G/DL (ref 6.4–8.3)
PSA SERPL DL<=0.01 NG/ML-MCNC: 0.74 NG/ML (ref 0–3.5)
RBC # BLD AUTO: 4.86 10E6/UL (ref 4.4–5.9)
SODIUM SERPL-SCNC: 137 MMOL/L (ref 135–145)
TRIGL SERPL-MCNC: 242 MG/DL
WBC # BLD AUTO: 10 10E3/UL (ref 4–11)

## 2024-11-20 PROCEDURE — 36415 COLL VENOUS BLD VENIPUNCTURE: CPT | Mod: ZL

## 2024-11-20 PROCEDURE — G0103 PSA SCREENING: HCPCS | Mod: ZL

## 2024-11-20 PROCEDURE — 82465 ASSAY BLD/SERUM CHOLESTEROL: CPT | Mod: ZL

## 2024-11-20 PROCEDURE — 82043 UR ALBUMIN QUANTITATIVE: CPT | Mod: ZL

## 2024-11-20 PROCEDURE — 84155 ASSAY OF PROTEIN SERUM: CPT | Mod: ZL

## 2024-11-20 PROCEDURE — 82435 ASSAY OF BLOOD CHLORIDE: CPT | Mod: ZL

## 2024-11-20 PROCEDURE — 80061 LIPID PANEL: CPT | Mod: ZL

## 2024-11-20 PROCEDURE — 85025 COMPLETE CBC W/AUTO DIFF WBC: CPT | Mod: ZL

## 2024-11-20 PROCEDURE — 83036 HEMOGLOBIN GLYCOSYLATED A1C: CPT | Mod: ZL

## 2024-11-20 RX ORDER — LISINOPRIL 40 MG/1
40 TABLET ORAL DAILY
Qty: 90 TABLET | Refills: 4 | Status: SHIPPED | OUTPATIENT
Start: 2024-11-20

## 2024-11-20 RX ORDER — AMLODIPINE BESYLATE 10 MG/1
10 TABLET ORAL DAILY
Qty: 90 TABLET | Refills: 4 | Status: SHIPPED | OUTPATIENT
Start: 2024-11-20

## 2024-11-20 RX ORDER — INSULIN ASPART 100 [IU]/ML
20 INJECTION, SOLUTION INTRAVENOUS; SUBCUTANEOUS
Qty: 45 ML | Refills: 4 | Status: SHIPPED | OUTPATIENT
Start: 2024-11-20 | End: 2024-11-20

## 2024-11-20 RX ORDER — MUPIROCIN 20 MG/G
OINTMENT TOPICAL 3 TIMES DAILY
Qty: 1 G | Refills: 0 | Status: SHIPPED | OUTPATIENT
Start: 2024-11-20

## 2024-11-20 RX ORDER — OXYCODONE HYDROCHLORIDE 5 MG/1
5 TABLET ORAL EVERY 6 HOURS PRN
Qty: 90 TABLET | Refills: 0 | Status: SHIPPED | OUTPATIENT
Start: 2024-11-20

## 2024-11-20 RX ORDER — CARVEDILOL 25 MG/1
25 TABLET ORAL DAILY
Qty: 90 TABLET | Refills: 3 | Status: SHIPPED | OUTPATIENT
Start: 2024-11-20

## 2024-11-20 RX ORDER — INSULIN ASPART 100 [IU]/ML
15 INJECTION, SOLUTION INTRAVENOUS; SUBCUTANEOUS
Qty: 3 ML | Refills: 4 | COMMUNITY
Start: 2024-11-20

## 2024-11-20 RX ORDER — ATORVASTATIN CALCIUM 10 MG/1
10 TABLET, FILM COATED ORAL DAILY
Qty: 90 TABLET | Refills: 0 | Status: SHIPPED | OUTPATIENT
Start: 2024-11-20

## 2024-11-20 RX ORDER — TRAMADOL HYDROCHLORIDE 50 MG/1
50 TABLET ORAL EVERY 6 HOURS PRN
Qty: 90 TABLET | Refills: 5 | Status: SHIPPED | OUTPATIENT
Start: 2024-11-20

## 2024-11-20 SDOH — HEALTH STABILITY: PHYSICAL HEALTH: ON AVERAGE, HOW MANY MINUTES DO YOU ENGAGE IN EXERCISE AT THIS LEVEL?: 20 MIN

## 2024-11-20 SDOH — HEALTH STABILITY: PHYSICAL HEALTH: ON AVERAGE, HOW MANY DAYS PER WEEK DO YOU ENGAGE IN MODERATE TO STRENUOUS EXERCISE (LIKE A BRISK WALK)?: 3 DAYS

## 2024-11-20 ASSESSMENT — PAIN SCALES - GENERAL: PAINLEVEL_OUTOF10: SEVERE PAIN (6)

## 2024-11-20 ASSESSMENT — SOCIAL DETERMINANTS OF HEALTH (SDOH): HOW OFTEN DO YOU GET TOGETHER WITH FRIENDS OR RELATIVES?: ONCE A WEEK

## 2024-11-20 ASSESSMENT — ENCOUNTER SYMPTOMS: ABDOMINAL DISTENTION: 1

## 2024-11-20 NOTE — PROGRESS NOTES
Preventive Care Visit  [facility]  Patricia Barnes, COLETTE CNP, Nurse Practitioner Primary Care  Nov 20, 2024      Assessment & Plan     ICD-10-CM    1. Routine general medical examination at a health care facility  Z00.00       2. Need for COVID-19 vaccine  Z23 COVID-19 12+ (PFIZER)      3. Diabetes mellitus without complication (H)  E11.9 Insulin Aspart FlexPen 100 UNIT/ML SOPN     DISCONTINUED: Insulin Aspart FlexPen 100 UNIT/ML SOPN      4. 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 Need for pneumococcal 20-valent conjugate vaccination  Z23 PNEUMOCOCCAL 20 VALENT CONJUGATE (PREVNAR 20)      6. Chronic combined systolic and diastolic congestive heart failure (H)  I50.42 Echocardiogram Complete      7. Encounter for screening for malignant neoplasm of prostate  Z12.5 PSA Screen GH     PSA Screen GH      8. Colon cancer screening  Z12.11 Colonoscopy Screening  Referral      9. HTN (hypertension), malignant  I10 carvedilol (COREG) 25 MG tablet     CBC and Differential     lisinopril (ZESTRIL) 40 MG tablet     CBC and Differential      10. Essential hypertension  I10 amLODIPine (NORVASC) 10 MG tablet      11. Local infection of skin and subcutaneous tissue  L08.9 mupirocin (BACTROBAN) 2 % external ointment      12. Vaccine counseling  Z71.85 GH IMM - ZOSTER VACCINE RECOMBINANT ADJUVANTED IM NJX (SHINGRIX)      13. Type 2 diabetes mellitus with hyperglycemia, with long-term current use of insulin (H)  E11.65 Hemoglobin A1c    Z79.4 Albumin Random Urine Quantitative with Creat Ratio     Lipid Panel     Comprehensive Metabolic Panel     empagliflozin (JARDIANCE) 25 MG TABS tablet      14. 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 Type 2 diabetes mellitus with other specified complication, with long-term current use of insulin (H)  E11.69 insulin glargine (LANTUS PEN) 100 UNIT/ML pen    Z79.4        16. Major depressive disorder, recurrent episode, moderate (H)  F33.1       17. Aneurysm of ascending aorta without rupture (H)  I71.21       18. Morbid obesity (H)  E66.01       19. Adrenal mass, right (H)  E27.8       20. Mixed hyperlipidemia  E78.2 atorvastatin (LIPITOR) 10 MG tablet     Lipid Profile     Comprehensive metabolic panel           HYPERTENSION - Ongoing. Blood pressure is currently well controlled.  Medication side effects: None. Denies syncope or presyncope.  Continue current medications -amlodipine, carvedilol, lisinopril.   Medication list reviewed/updated. Refills completed as needed.      MIXED HYPERLIPIDEMIA.  Ongoing. LDL is at goal: No. Triglycerides are at goal: No.  Hopefully lifestyle modifications will improve cholesterol levels, otherwise will consider additional medication dose adjustments or medication changes. He was discontinued from his statin 2/23/24 after hospitalization from critically elevated liver enzymes which was presumed to be viral in nature, he is asplenic. He was discontinued off of his statin at this time and never trialed restarting this. He has been on a statin years prior to this without elevation in liver enzymes. With his history of uncontrolled diabetes, cholesterol control is critical. We will trial restarting low dose Lipitor and recheck CMP and lipids in 1-3 months.       OBESITY - Ongoing.  (See Encounter Diagnosis list above for obesity class / severity).  - Encourage continued maintenance / improvement in diet and exercise.   - Consider Nutrition / Dietician appointment.  - Weight loss would improve Hypertension, Cholesterol and Diabetes.      Chronic pain syndrome.  Chronic continuous opiate use.  Currently utilizing oxycodone/acetaminophen (Percocet, Tylox) and tramadol (Ultram)    for chronic pain management.  Seems to be doing well with current medication regimen.   website reviewed, No abnormal findings noted.  No benzodiazepine use  Prescriptions  "refilled as noted. Patient should return in 3 months for chronic pain management.     Type 2 Diabetes Mellitus, Reports ongoing/previous. He does not check blood sugars.  Continues to be uncontrolled, A1c today is 11.5.  He is currently only taking short acting insulin 20 units 3 times a day with meals, he is not currently taking long-acting insulin.  Due to uncontrolled diabetes recommend he start long-acting insulin-Lantus initiating 22 units twice daily  And reduce short acting insulin to 15 units 3 times daily with meals, increase Jardiance to 25 mg daily.      Ventral hernia: Large recurrent ventral hernia, he has been managing his pain with a combination of tramadol and oxycodone, PDMP website reviewed and is appropriate.  He uses oxycodone very sparingly when pain is severe.  He has a couple open sores on his skin at the location of the hernia, prescription for topical Bactroban sent to pharmacy.  Instructed him to continue to monitor area and return to clinic if symptoms worsen.    Adenocarcinoma, appendix: Follows with the [facility] on a regular basis, also utilizes pain medications for cancer related pain.    Recommend patient follow up in 3 months for diabetes check.     Patient received shingles, COVID, pneumonia vaccines today.             BMI  Estimated body mass index is 38.12 kg/m  as calculated from the following:    Height as of this encounter: 1.905 m (6' 3\").    Weight as of this encounter: 138.3 kg (305 lb).       Counseling  Appropriate preventive services were addressed with this patient via screening, questionnaire, or discussion as appropriate for fall prevention, nutrition, physical activity, Tobacco-use cessation, social engagement, weight loss and cognition.  Checklist reviewing preventive services available has been given to the patient.  Reviewed patient's diet, addressing concerns and/or questions.   He is at risk for lack of exercise and has been provided with information to increase " physical activity for the benefit of his well-being.   The patient was instructed to see the dentist every 6 months.   He is at risk for psychosocial distress and has been provided with information to reduce risk.         Return in about 53 weeks (around 11/26/2025) for Annual Wellness Visit.      COLETTE Hubbard CNP  [facility]    Review of Systems   Gastrointestinal:  Positive for abdominal distention (large hernia).           Ariane Giraldo is a 54 year old, presenting for the following:  Physical    Patient presents to clinic for annual wellness visit. He is planning to travel to [place] to see his daughter and will be there for 3 months, needs medication refills.  He has no new concerns today.      HPI          Health Care Directive  Patient does not have a Health Care Directive: Discussed advance care planning with patient; however, patient declined at this time.      11/20/2024   General Health   How would you rate your overall physical health? (!) FAIR   Feel stress (tense, anxious, or unable to sleep) Very much            11/20/2024   Nutrition   Three or more servings of calcium each day? Yes   Diet: Low salt    Diabetic    Carbohydrate counting    Breakfast skipped   How many servings of fruit and vegetables per day? (!) 2-3   How many sweetened beverages each day? 0-1       Multiple values from one day are sorted in reverse-chronological order         11/20/2024   Exercise   Days per week of moderate/strenous exercise 3 days   Average minutes spent exercising at this level 20 min            11/20/2024   Social Factors   Frequency of gathering with friends or relatives Once a week            11/20/2024   Dental   Dentist two times every year? (!) NO               Today's PHQ-9 Score:       10/31/2024     2:06 PM   PHQ-9 SCORE   PHQ-9 Total Score MyChart 2 (Minimal depression)   PHQ-9 Total Score 2        Patient-reported           11/20/2024   Substance Use   Alcohol more  "than 3/day or more than 7/wk No   Do you use any other substances recreationally? No        Social History     Tobacco Use    Smoking status: Never    Smokeless tobacco: Never   Vaping Use    Vaping status: Never Used   Substance Use Topics    Alcohol use: Yes     Alcohol/week: 0.0 standard drinks of alcohol     Comment: beer 2 times a month    Drug use: No       ASCVD Risk   The 10-year ASCVD risk score (Fazal GUADARRAMA, et al., 2019) is: 16.8%    Values used to calculate the score:      Age: 54 years      Sex: Male      Is Non- : No      Diabetic: Yes      Tobacco smoker: No      Systolic Blood Pressure: 130 mmHg      Is BP treated: Yes      HDL Cholesterol: 39 mg/dL      Total Cholesterol: 231 mg/dL           Reviewed and updated as needed this visit by Provider   Tobacco  Allergies  Meds  Problems  Med Hx  Surg Hx  Fam Hx                     Objective    Exam  /60 (BP Location: Right arm, Patient Position: Sitting, Cuff Size: Adult Large)   Pulse 60   Temp 97.6  F (36.4  C) (Temporal)   Resp 16   Ht 1.905 m (6' 3\")   Wt 138.3 kg (305 lb)   SpO2 99%   BMI 38.12 kg/m     Estimated body mass index is 38.12 kg/m  as calculated from the following:    Height as of this encounter: 1.905 m (6' 3\").    Weight as of this encounter: 138.3 kg (305 lb).    Physical Exam  Vitals reviewed.   Constitutional:       General: He is not in acute distress.     Appearance: He is obese. He is not toxic-appearing.   Cardiovascular:      Rate and Rhythm: Normal rate and regular rhythm.      Pulses: Normal pulses.      Heart sounds: Normal heart sounds. No murmur heard.  Pulmonary:      Effort: Pulmonary effort is normal.      Breath sounds: Normal breath sounds.   Abdominal:      Hernia: A hernia (large ventral hernia- small superficial skin irritation with slight erythema) is present.   Neurological:      General: No focal deficit present.      Mental Status: Mental status is at baseline. "   Psychiatric:         Mood and Affect: Mood normal.         Behavior: Behavior normal.       Results for orders placed or performed in visit on 11/20/24   Hemoglobin A1c     Status: Abnormal   Result Value Ref Range    Estimated Average Glucose 283 (H) <117 mg/dL    Hemoglobin A1C 11.5 (H) <5.7 %   Albumin Random Urine Quantitative with Creat Ratio     Status: Abnormal   Result Value Ref Range    Creatinine Urine mg/dL 78.0 mg/dL    Albumin Urine mg/L 289.4 mg/L    Albumin Urine mg/g Cr 371.03 (H) 0.00 - 17.00 mg/g Cr   Lipid Panel     Status: Abnormal   Result Value Ref Range    Cholesterol 231 (H) <200 mg/dL    Triglycerides 242 (H) <150 mg/dL    Direct Measure HDL 39 (L) >=40 mg/dL    LDL Cholesterol Calculated 144 (H) <100 mg/dL    Non HDL Cholesterol 192 (H) <130 mg/dL    Patient Fasting > 8hrs? Yes     Narrative    Cholesterol  Desirable: < 200 mg/dL  Borderline High: 200 - 239 mg/dL  High: >= 240 mg/dL    Triglycerides  Normal: < 150 mg/dL  Borderline High: 150 - 199 mg/dL  High: 200-499 mg/dL  Very High: >= 500 mg/dL    Direct Measure HDL  Female: >= 50 mg/dL   Male: >= 40 mg/dL    LDL Cholesterol  Desirable: < 100 mg/dL  Above Desirable: 100 - 129 mg/dL   Borderline High: 130 - 159 mg/dL   High:  160 - 189 mg/dL   Very High: >= 190 mg/dL    Non HDL Cholesterol  Desirable: < 130 mg/dL  Above Desirable: 130 - 159 mg/dL  Borderline High: 160 - 189 mg/dL  High: 190 - 219 mg/dL  Very High: >= 220 mg/dL   Comprehensive Metabolic Panel     Status: Abnormal   Result Value Ref Range    Sodium 137 135 - 145 mmol/L    Potassium 4.5 3.4 - 5.3 mmol/L    Carbon Dioxide (CO2) 28 22 - 29 mmol/L    Anion Gap 10 7 - 15 mmol/L    Urea Nitrogen 13.7 6.0 - 20.0 mg/dL    Creatinine 0.97 0.67 - 1.17 mg/dL    GFR Estimate >90 >60 mL/min/1.73m2    Calcium 9.9 8.8 - 10.4 mg/dL    Chloride 99 98 - 107 mmol/L    Glucose 186 (H) 70 - 99 mg/dL    Alkaline Phosphatase 137 40 - 150 U/L    AST 26 0 - 45 U/L    ALT 36 0 - 70 U/L    Protein  Total 7.7 6.4 - 8.3 g/dL    Albumin 4.3 3.5 - 5.2 g/dL    Bilirubin Total 0.4 <=1.2 mg/dL    Patient Fasting > 8hrs? Yes    PSA Screen GH     Status: Normal   Result Value Ref Range    Prostate Specific Antigen Screen 0.74 0.00 - 3.50 ng/mL    Narrative    This result is obtained using the Roche Elecsys total PSA method on the frank e601 immunoassay analyzer, which is an ultrasensitive method. Results obtained with different assay methods or kits cannot be used interchangeably.  This test is intended for initial prostate cancer screening. PSA values exceeding the age-specific limits are suspicious for prostate disease, but additional testing, such as prostate biopsy, is needed to diagnose prostate pathology. The American Cancer Society recommends annual examination with digital rectal examination and serum PSA beginning at age 50 and for men with a life expectancy of at least 10 years after detection of prostate cancer. For men in high-risk groups, such as  Americans or men with a first-degree relative diagnosed at a younger age, testing should begin at a younger age. It is generally recommended that information be provided to patients about the benefits and limitations of testing and treatment so they can make informed decisions.   CBC with platelets and differential     Status: None   Result Value Ref Range    WBC Count 10.0 4.0 - 11.0 10e3/uL    RBC Count 4.86 4.40 - 5.90 10e6/uL    Hemoglobin 15.7 13.3 - 17.7 g/dL    Hematocrit 46.0 40.0 - 53.0 %    MCV 95 78 - 100 fL    MCH 32.3 26.5 - 33.0 pg    MCHC 34.1 31.5 - 36.5 g/dL    RDW 12.7 10.0 - 15.0 %    Platelet Count 271 150 - 450 10e3/uL    % Neutrophils 61 %    % Lymphocytes 28 %    % Monocytes 8 %    % Eosinophils 2 %    % Basophils 1 %    % Immature Granulocytes 0 %    NRBCs per 100 WBC 0 <1 /100    Absolute Neutrophils 6.1 1.6 - 8.3 10e3/uL    Absolute Lymphocytes 2.8 0.8 - 5.3 10e3/uL    Absolute Monocytes 0.8 0.0 - 1.3 10e3/uL    Absolute  Eosinophils 0.2 0.0 - 0.7 10e3/uL    Absolute Basophils 0.1 0.0 - 0.2 10e3/uL    Absolute Immature Granulocytes 0.0 <=0.4 10e3/uL    Absolute NRBCs 0.0 10e3/uL   CBC and Differential     Status: None    Narrative    The following orders were created for panel order CBC and Differential.  Procedure                               Abnormality         Status                     ---------                               -----------         ------                     CBC with platelets and d...[[id number]]                      Final result                 Please view results for these tests on the individual orders.               Signed Electronically by: COLETTE Hubbard CNP

## 2024-11-20 NOTE — TELEPHONE ENCOUNTER
Enzo- your diabetes remains uncontrolled and we need to improve management of this. I recommend we start you on long acting insulin (Lantus) 22 units twice daily-morning and night-and we will decrease your short acting insulin to 15 units 3 times daily since we are adding a long acting insulin. Also increase your Jardiance to 25 mg daily.  I also reviewed your chart in regards to your hospitalization with elevated liver enzymes.  I recommend that we restart you on a lower potency statin such as Lipitor 10 mg daily and return in 1 to 3 months for a lab only visit to recheck cholesterol levels and liver function at that time- lab orders have been placed.  With your history of diabetes, it is important that we get your LDL cholesterol less than 70 as you are at an increased risk for heart attack or stroke. Let me know if you have any questions, I did send in the lantus and lipitor to the pharmacy.   Written by COLETTE Hubbard CNP on 11/20/2024  3:55 PM CST  Seen by patient Jaswant CASTRO Chong on 11/20/2024  3:57 PM    Patient update on MyChart.    Lona Sabillon RN on 11/20/2024 at 4:04 PM

## 2024-11-20 NOTE — PATIENT INSTRUCTIONS
Patient Education   Preventive Care Advice   This is general advice given by our system to help you stay healthy. However, your care team may have specific advice just for you. Please talk to your care team about your preventive care needs.  Nutrition  Eat 5 or more servings of fruits and vegetables each day.  Try wheat bread, brown rice and whole grain pasta (instead of white bread, rice, and pasta).  Get enough calcium and vitamin D. Check the label on foods and aim for 100% of the RDA (recommended daily allowance).  Lifestyle  Exercise at least 150 minutes each week  (30 minutes a day, 5 days a week).  Do muscle strengthening activities 2 days a week. These help control your weight and prevent disease.  No smoking.  Wear sunscreen to prevent skin cancer.  Have a dental exam and cleaning every 6 months.  Yearly exams  See your health care team every year to talk about:  Any changes in your health.  Any medicines your care team has prescribed.  Preventive care, family planning, and ways to prevent chronic diseases.  Shots (vaccines)   HPV shots (up to age 26), if you've never had them before.  Hepatitis B shots (up to age 59), if you've never had them before.  COVID-19 shot: Get this shot when it's due.  Flu shot: Get a flu shot every year.  Tetanus shot: Get a tetanus shot every 10 years.  Pneumococcal, hepatitis A, and RSV shots: Ask your care team if you need these based on your risk.  Shingles shot (for age 50 and up)  General health tests  Diabetes screening:  Starting at age 35, Get screened for diabetes at least every 3 years.  If you are younger than age 35, ask your care team if you should be screened for diabetes.  Cholesterol test: At age 39, start having a cholesterol test every 5 years, or more often if advised.  Bone density scan (DEXA): At age 50, ask your care team if you should have this scan for osteoporosis (brittle bones).  Hepatitis C: Get tested at least once in your life.  STIs (sexually  transmitted infections)  Before age 24: Ask your care team if you should be screened for STIs.  After age 24: Get screened for STIs if you're at risk. You are at risk for STIs (including HIV) if:  You are sexually active with more than one person.  You don't use condoms every time.  You or a partner was diagnosed with a sexually transmitted infection.  If you are at risk for HIV, ask about PrEP medicine to prevent HIV.  Get tested for HIV at least once in your life, whether you are at risk for HIV or not.  Cancer screening tests  Cervical cancer screening: If you have a cervix, begin getting regular cervical cancer screening tests starting at age 21.  Breast cancer scan (mammogram): If you've ever had breasts, begin having regular mammograms starting at age 40. This is a scan to check for breast cancer.  Colon cancer screening: It is important to start screening for colon cancer at age 45.  Have a colonoscopy test every 10 years (or more often if you're at risk) Or, ask your provider about stool tests like a FIT test every year or Cologuard test every 3 years.  To learn more about your testing options, visit:   .  For help making a decision, visit:   https://bit.ly/nb73586.  Prostate cancer screening test: If you have a prostate, ask your care team if a prostate cancer screening test (PSA) at age 55 is right for you.  Lung cancer screening: If you are a current or former smoker ages 50 to 80, ask your care team if ongoing lung cancer screenings are right for you.  For informational purposes only. Not to replace the advice of your health care provider. Copyright   2023 [facility]. All rights reserved. Clinically reviewed by the [facility] Transitions Program. [employer] 175310 - REV 01/24.  Preventing Falls: Care Instructions  Injuries and health problems such as trouble walking or poor eyesight can increase your risk of falling. So can some medicines. But there are things you can do to help  "prevent falls. You can exercise to get stronger. You can also arrange your home to make it safer.    Talk to your doctor about the medicines you take. Ask if any of them increase the risk of falls and whether they can be changed or stopped.   Try to exercise regularly. It can help improve your strength and balance. This can help lower your risk of falling.         Practice fall safety and prevention.   Wear low-heeled shoes that fit well and give your feet good support. Talk to your doctor if you have foot problems that make this hard.  Carry a cellphone or wear a medical alert device that you can use to call for help.  Use stepladders instead of chairs to reach high objects. Don't climb if you're at risk for falls. Ask for help, if needed.  Wear the correct eyeglasses, if you need them.        Make your home safer.   Remove rugs, cords, clutter, and furniture from walkways.  Keep your house well lit. Use night-lights in hallways and bathrooms.  Install and use sturdy handrails on stairways.  Wear nonskid footwear, even inside. Don't walk barefoot or in socks without shoes.        Be safe outside.   Use handrails, curb cuts, and ramps whenever possible.  Keep your hands free by using a shoulder bag or backpack.  Try to walk in well-lit areas. Watch out for uneven ground, changes in pavement, and debris.  Be careful in the winter. Walk on the grass or gravel when sidewalks are slippery. Use de-icer on steps and walkways. Add non-slip devices to shoes.    Put grab bars and nonskid mats in your shower or tub and near the toilet. Try to use a shower chair or bath bench when bathing.   Get into a tub or shower by putting in your weaker leg first. Get out with your strong side first. Have a phone or medical alert device in the bathroom with you.   Where can you learn more?  Go to https://www.APImetricswise.net/patiented  Enter G117 in the search box to learn more about \"Preventing Falls: Care Instructions.\"  Current as of: " July 17, 2023  Content Version: 14.2 2024 [employer].   Care instructions adapted under license by your healthcare professional. If you have questions about a medical condition or this instruction, always ask your healthcare professional. [employer] disclaims any warranty or liability for your use of this information.    Learning About Stress  What is stress?     Stress is your body's response to a hard situation. Your body can have a physical, emotional, or mental response. Stress is a fact of life for most people, and it affects everyone differently. What causes stress for you may not be stressful for someone else.  A lot of things can cause stress. You may feel stress when you go on a job interview, take a test, or run a race. This kind of short-term stress is normal and even useful. It can help you if you need to work hard or react quickly. For example, stress can help you finish an important job on time.  Long-term stress is caused by ongoing stressful situations or events. Examples of long-term stress include long-term health problems, ongoing problems at work, or conflicts in your family. Long-term stress can harm your health.  How does stress affect your health?  When you are stressed, your body responds as though you are in danger. It makes hormones that speed up your heart, make you breathe faster, and give you a burst of energy. This is called the fight-or-flight stress response. If the stress is over quickly, your body goes back to normal and no harm is done.  But if stress happens too often or lasts too long, it can have bad effects. Long-term stress can make you more likely to get sick, and it can make symptoms of some diseases worse. If you tense up when you are stressed, you may develop neck, shoulder, or low back pain. Stress is linked to high blood pressure and heart disease.  Stress also harms your emotional health. It can make you jimenez, tense, or depressed. Your  relationships may suffer, and you may not do well at work or school.  What can you do to manage stress?  You can try these things to help manage stress:   Do something active. Exercise or activity can help reduce stress. Walking is a great way to get started. Even everyday activities such as housecleaning or yard work can help.  Try yoga or yessy chi. These techniques combine exercise and meditation. You may need some training at first to learn them.  Do something you enjoy. For example, listen to music or go to a movie. Practice your hobby or do volunteer work.  Meditate. This can help you relax, because you are not worrying about what happened before or what may happen in the future.  Do guided imagery. Imagine yourself in any setting that helps you feel calm. You can use online videos, books, or a teacher to guide you.  Do breathing exercises. For example:  From a standing position, bend forward from the waist with your knees slightly bent. Let your arms dangle close to the floor.  Breathe in slowly and deeply as you return to a standing position. Roll up slowly and lift your head last.  Hold your breath for just a few seconds in the standing position.  Breathe out slowly and bend forward from the waist.  Let your feelings out. Talk, laugh, cry, and express anger when you need to. Talking with supportive friends or family, a counselor, or a lion leader about your feelings is a healthy way to relieve stress. Avoid discussing your feelings with people who make you feel worse.  Write. It may help to write about things that are bothering you. This helps you find out how much stress you feel and what is causing it. When you know this, you can find better ways to cope.  What can you do to prevent stress?  You might try some of these things to help prevent stress:  Manage your time. This helps you find time to do the things you want and need to do.  Get enough sleep. Your body recovers from the stresses of the day while  "you are sleeping.  Get support. Your family, friends, and community can make a difference in how you experience stress.  Limit your news feed. Avoid or limit time on social media or news that may make you feel stressed.  Do something active. Exercise or activity can help reduce stress. Walking is a great way to get started.  Where can you learn more?  Go to https://www.Flirtic.com.net/patiented  Enter N032 in the search box to learn more about \"Learning About Stress.\"  Current as of: October 24, 2023  Content Version: 14.2 2024 [facility].   Care instructions adapted under license by your healthcare professional. If you have questions about a medical condition or this instruction, always ask your healthcare professional. [employer] disclaims any warranty or liability for your use of this information.       "

## 2024-11-20 NOTE — NURSING NOTE
"Chief Complaint   Patient presents with    Physical     Patient presents to the clinic today for a physical  Initial There were no vitals taken for this visit. Estimated body mass index is 38.95 kg/m  as calculated from the following:    Height as of 10/31/24: 1.905 m (6' 3\").    Weight as of 10/31/24: 141.3 kg (311 lb 9.6 oz).  Meds Reconciled: complete      Loreta Sanchez LPN,LPN on 11/20/2024 at 9:32 AM  Ext. [phone]        Loreta Sanchez LPN  "

## 2024-11-21 ENCOUNTER — PATIENT OUTREACH (OUTPATIENT)
Dept: GASTROENTEROLOGY | Facility: CLINIC | Age: 54
End: 2024-11-21
Payer: COMMERCIAL

## 2024-12-15 DIAGNOSIS — F33.1 MAJOR DEPRESSIVE DISORDER, RECURRENT EPISODE, MODERATE (H): ICD-10-CM

## 2024-12-17 RX ORDER — VENLAFAXINE HYDROCHLORIDE 150 MG/1
150 CAPSULE, EXTENDED RELEASE ORAL DAILY
Qty: 90 CAPSULE | Refills: 4 | OUTPATIENT
Start: 2024-12-17

## 2024-12-17 NOTE — TELEPHONE ENCOUNTER
[facility] sent Rx request for the followin/20/24 1258 Discontinue Patricia Barnes APRN CNP Reason:Stopped by Patient (No AVS)     venlafaxine (EFFEXOR XR) 150 MG 24 hr capsule (Discontinued) 90 capsule 4 2023 No   Sig - Route: Take 1 capsule (150 mg) by mouth daily - Oral     Pharmacy notified. Qiana Kay RN .............. 2024  4:30 PM

## 2025-02-17 DIAGNOSIS — E78.2 MIXED HYPERLIPIDEMIA: ICD-10-CM

## 2025-02-19 RX ORDER — ATORVASTATIN CALCIUM 10 MG/1
10 TABLET, FILM COATED ORAL DAILY
Qty: 90 TABLET | Refills: 2 | Status: SHIPPED | OUTPATIENT
Start: 2025-02-19

## 2025-02-19 NOTE — TELEPHONE ENCOUNTER
[facility] sent Rx request for the following:      Requested Prescriptions   Pending Prescriptions Disp Refills    atorvastatin (LIPITOR) 10 MG tablet [Pharmacy Med Name: ATORVASTATIN 10MG TABLETS] 90 tablet 0     Sig: TAKE 1 TABLET(10 MG) BY MOUTH DAILY       Antihyperlipidemic agents Passed - 2/19/2025  3:32 PM     Last Prescription Date:   11/20/2024  Last Fill Qty/Refills:         90, R-0    Last Office Visit:              11/20/2024   Future Office visit:           none    Prescription approved per [facility] Refill Protocol.  Chester Cruz RN on 2/19/2025 at 3:33 PM

## 2025-04-17 DIAGNOSIS — R19.7 BLOODY DIARRHEA: Primary | ICD-10-CM

## 2025-05-24 ENCOUNTER — HEALTH MAINTENANCE LETTER (OUTPATIENT)
Age: 55
End: 2025-05-24

## 2025-06-19 ENCOUNTER — RESULTS FOLLOW-UP (OUTPATIENT)
Dept: FAMILY MEDICINE | Facility: OTHER | Age: 55
End: 2025-06-19

## 2025-06-19 DIAGNOSIS — Z12.11 ENCOUNTER FOR SCREENING COLONOSCOPY: Primary | ICD-10-CM

## 2025-06-19 DIAGNOSIS — E11.65 TYPE 2 DIABETES MELLITUS WITH HYPERGLYCEMIA, WITH LONG-TERM CURRENT USE OF INSULIN (H): Primary | ICD-10-CM

## 2025-06-19 DIAGNOSIS — C18.1 MALIGNANT NEOPLASM OF APPENDIX VERMIFORMIS (H): Primary | ICD-10-CM

## 2025-06-19 DIAGNOSIS — Z79.4 TYPE 2 DIABETES MELLITUS WITH HYPERGLYCEMIA, WITH LONG-TERM CURRENT USE OF INSULIN (H): Primary | ICD-10-CM

## 2025-06-19 RX ORDER — BISACODYL 5 MG/1
TABLET, DELAYED RELEASE ORAL
Qty: 2 TABLET | Refills: 0 | Status: SHIPPED | OUTPATIENT
Start: 2025-07-25

## 2025-06-19 RX ORDER — OXYCODONE HYDROCHLORIDE 5 MG/1
5 TABLET ORAL EVERY 6 HOURS PRN
Qty: 90 TABLET | Refills: 0 | Status: SHIPPED | OUTPATIENT
Start: 2025-06-19

## 2025-06-19 RX ORDER — POLYETHYLENE GLYCOL 3350, SODIUM CHLORIDE, SODIUM BICARBONATE, POTASSIUM CHLORIDE 420; 11.2; 5.72; 1.48 G/4L; G/4L; G/4L; G/4L
4000 POWDER, FOR SOLUTION ORAL ONCE
Qty: 4000 ML | Refills: 0 | Status: SHIPPED | OUTPATIENT
Start: 2025-07-25 | End: 2025-07-25

## 2025-06-19 NOTE — TELEPHONE ENCOUNTER
Reason for call: Medication or medication refill    Have you contacted your pharmacy regarding this refill? YES    Name of medication requested: oxycodone    How many days of medication do you have left? ZERO    What pharmacy do you use? [facility] [place]    Preferred method for responding to this message: Telephone Call    Phone number patient can be reached at: Cell number on file:    Telephone Information:   Mobile [phone]       If we cannot reach you directly, may we leave a detailed response at the number you provided? No      The patient requests a call back to discuss. The patient stated the pharmacy told him the dispense date is 07/17/2025 however the patient is out of medication now.      Tory Soria on 6/19/2025 at 9:26 AM

## 2025-06-19 NOTE — TELEPHONE ENCOUNTER
Screening Questions for the Scheduling of Screening Colonoscopies   (If Colonoscopy is diagnostic, Provider should review the chart before scheduling.)  Are you younger than 45 or older than 80?  NO  Do you take aspirin or fish oil?  ASPIRIN (if yes, tell patient to stop 1 week prior to Colonoscopy)  Do you take warfarin (Coumadin), clopidogrel (Plavix), apixaban (Eliquis), dabigatram (Pradaxa), rivaroxaban (Xarelto) or any blood thinner? NO  Do you take semaglutide (Ozempic or Wegovy), tirzepatide (Mounjaro or Zepbound), liraglutide (Victoza), or dulaglutide (Trulicity)? NO  Do you use oxygen or a CPAP at home?  NO  Do you have kidney disease? NO  Are you on dialysis? NO  Have you had a stroke or heart attack in the last year? NO  Have you had a stent in your heart or any blood vessel in the last year? NO  Have you had a transplant of any organ? NO  Have you had a colonoscopy or upper endoscopy (EGD) before? YES         When?  2015  Date of scheduled Colonoscopy. 08/01/2025  Provider [facility]  Pharmacy [facility]  Order Priority ROUTINE

## 2025-06-19 NOTE — TELEPHONE ENCOUNTER
Dr. Kumari,   Patient's oxycodone script sent at today's office visit has a start date of 7/17/25. Rx pending with a start date of today.   Cha Carter RN on 6/19/2025 at 10:30 AM

## 2025-06-20 ENCOUNTER — HOSPITAL ENCOUNTER (OUTPATIENT)
Facility: OTHER | Age: 55
End: 2025-06-20
Attending: SURGERY | Admitting: SURGERY
Payer: COMMERCIAL

## 2025-06-23 RX ORDER — ACYCLOVIR 400 MG/1
1 TABLET ORAL
Qty: 9 EACH | Refills: 5 | Status: SHIPPED | OUTPATIENT
Start: 2025-06-23

## 2025-06-23 RX ORDER — ACYCLOVIR 400 MG/1
1 TABLET ORAL ONCE
Qty: 1 EACH | Refills: 0 | Status: SHIPPED | OUTPATIENT
Start: 2025-06-23 | End: 2025-06-23

## 2025-06-23 NOTE — TELEPHONE ENCOUNTER
Would like a CGM. Preferred is a Dexcom.     Jamie'd up order for G7.     Routing to provider to review and respond.  Ophelia Escalante RN on 6/23/2025 at 3:35 PM

## 2025-07-07 ENCOUNTER — MYC MEDICAL ADVICE (OUTPATIENT)
Dept: FAMILY MEDICINE | Facility: OTHER | Age: 55
End: 2025-07-07
Payer: COMMERCIAL

## 2025-07-07 DIAGNOSIS — Z79.4 TYPE 2 DIABETES MELLITUS WITH HYPERGLYCEMIA, WITH LONG-TERM CURRENT USE OF INSULIN (H): Primary | ICD-10-CM

## 2025-07-07 DIAGNOSIS — E11.9 DIABETES MELLITUS WITHOUT COMPLICATION (H): ICD-10-CM

## 2025-07-07 DIAGNOSIS — E11.65 TYPE 2 DIABETES MELLITUS WITH HYPERGLYCEMIA, WITH LONG-TERM CURRENT USE OF INSULIN (H): Primary | ICD-10-CM

## 2025-07-07 NOTE — TELEPHONE ENCOUNTER
Blood sugars averaging 200. Can only get down near 100-125 if he has not eaten for 5-6 hours. Does he need a med change?     LOV 6/19    Currently prescribed Insulin Aspart 15 units TID with meals, Lantus 22 units BID.    Recommend increasing Lantus.     Routing to provider to review and respond.  Ophelia Escalante RN on 7/7/2025 at 12:09 PM

## 2025-07-07 NOTE — TELEPHONE ENCOUNTER
Spoke with patient.  He has increased his Novolog from 15 to 20 units TID AC.  He continues to take Lantus 22 units BID.    CGM Review:  -----------------------------  Dexcom Clarity  -----------------------------  Enzo Chong    YOB: 1970    Generated at: Mon, Jul 7, 2025 5:32 PM CDT    Reporting period: Tue Jun 24, 2025 - Mon Jul 7, 2025  -----------------------------  Glucose Details    Average glucose: 221 mg/dL    GMI: 8.6%    Standard deviation: 66 mg/dL    Coefficient of Variation: 29.9%  -----------------------------  Time in Range    Very High: 27%    High: 44%    In Range: 29%    Low: 0%    Very Low: 0%    Target Range   mg/dL    -----------------------------  Sensor usage    Days with data: 13/14    Time active: 92%    Avg. calibrations per day: 0.0  -----------------------------        Recommendations:    Lantus increase from 22 to 24 units twice daily.    Also discussed using a Novolog Sliding Scale BEFORE each meal for correction of high glucose:    Blood Glucose       Units of Novolog insulin  150  to  199 = 2 units of extra insulin  200  to  249 = 4 units of extra insulin  250  to  299 = 6 units of extra insulin  300  to  349 = 8 units of extra insulin  Equal to or greater than 350 =   10 units of extra insulin      If accepted as written, please sign pending orders.    Thank you,    Maddy Vargas RN, BSN, [facility] [place]  7/7/2025 6:02 PM

## 2025-07-08 ENCOUNTER — HOSPITAL ENCOUNTER (OUTPATIENT)
Dept: CARDIOLOGY | Facility: OTHER | Age: 55
Discharge: HOME | End: 2025-07-08
Attending: FAMILY MEDICINE
Payer: COMMERCIAL

## 2025-07-08 DIAGNOSIS — I50.32 CHRONIC DIASTOLIC HEART FAILURE (H): ICD-10-CM

## 2025-07-08 LAB — LVEF ECHO: NORMAL

## 2025-07-08 PROCEDURE — 93306 TTE W/DOPPLER COMPLETE: CPT

## 2025-07-08 RX ORDER — INSULIN ASPART 100 [IU]/ML
15-20 INJECTION, SOLUTION INTRAVENOUS; SUBCUTANEOUS
Qty: 60 ML | Refills: 4 | Status: SHIPPED | OUTPATIENT
Start: 2025-07-08

## (undated) RX ORDER — NICARDIPINE HYDROCHLORIDE 2.5 MG/ML
INJECTION INTRAVENOUS
Status: DISPENSED
Start: 2018-07-26

## (undated) RX ORDER — IBUPROFEN 200 MG
TABLET ORAL
Status: DISPENSED
Start: 2024-02-20

## (undated) RX ORDER — METHYLPREDNISOLONE ACETATE 40 MG/ML
INJECTION, SUSPENSION INTRA-ARTICULAR; INTRALESIONAL; INTRAMUSCULAR; SOFT TISSUE
Status: DISPENSED
Start: 2023-11-20

## (undated) RX ORDER — HYDRALAZINE HYDROCHLORIDE 20 MG/ML
INJECTION INTRAMUSCULAR; INTRAVENOUS
Status: DISPENSED
Start: 2018-07-26

## (undated) RX ORDER — REGADENOSON 0.08 MG/ML
INJECTION, SOLUTION INTRAVENOUS
Status: DISPENSED
Start: 2018-07-19

## (undated) RX ORDER — HYDROMORPHONE HYDROCHLORIDE 1 MG/ML
INJECTION, SOLUTION INTRAMUSCULAR; INTRAVENOUS; SUBCUTANEOUS
Status: DISPENSED
Start: 2024-02-20

## (undated) RX ORDER — LIDOCAINE HYDROCHLORIDE 10 MG/ML
INJECTION, SOLUTION INFILTRATION; PERINEURAL
Status: DISPENSED
Start: 2023-11-20

## (undated) RX ORDER — SODIUM CHLORIDE 9 MG/ML
INJECTION, SOLUTION INTRAVENOUS
Status: DISPENSED
Start: 2018-07-26

## (undated) RX ORDER — LIDOCAINE HYDROCHLORIDE 10 MG/ML
INJECTION, SOLUTION EPIDURAL; INFILTRATION; INTRACAUDAL; PERINEURAL
Status: DISPENSED
Start: 2018-07-26

## (undated) RX ORDER — HEPARIN SODIUM 1000 [USP'U]/ML
INJECTION, SOLUTION INTRAVENOUS; SUBCUTANEOUS
Status: DISPENSED
Start: 2018-07-26

## (undated) RX ORDER — FENTANYL CITRATE 50 UG/ML
INJECTION, SOLUTION INTRAMUSCULAR; INTRAVENOUS
Status: DISPENSED
Start: 2018-07-26

## (undated) RX ORDER — NITROGLYCERIN 5 MG/ML
VIAL (ML) INTRAVENOUS
Status: DISPENSED
Start: 2018-07-26